# Patient Record
Sex: FEMALE | Race: WHITE | NOT HISPANIC OR LATINO
[De-identification: names, ages, dates, MRNs, and addresses within clinical notes are randomized per-mention and may not be internally consistent; named-entity substitution may affect disease eponyms.]

---

## 2018-04-11 ENCOUNTER — APPOINTMENT (OUTPATIENT)
Dept: GASTROENTEROLOGY | Facility: CLINIC | Age: 83
End: 2018-04-11
Payer: MEDICARE

## 2018-04-11 VITALS
HEIGHT: 62 IN | SYSTOLIC BLOOD PRESSURE: 180 MMHG | WEIGHT: 122 LBS | DIASTOLIC BLOOD PRESSURE: 90 MMHG | BODY MASS INDEX: 22.45 KG/M2 | TEMPERATURE: 98.2 F

## 2018-04-11 DIAGNOSIS — I48.91 UNSPECIFIED ATRIAL FIBRILLATION: ICD-10-CM

## 2018-04-11 DIAGNOSIS — R53.82 CHRONIC FATIGUE, UNSPECIFIED: ICD-10-CM

## 2018-04-11 DIAGNOSIS — K21.9 GASTRO-ESOPHAGEAL REFLUX DISEASE W/OUT ESOPHAGITIS: ICD-10-CM

## 2018-04-11 DIAGNOSIS — D64.9 ANEMIA, UNSPECIFIED: ICD-10-CM

## 2018-04-11 PROCEDURE — 99204 OFFICE O/P NEW MOD 45 MIN: CPT

## 2018-04-11 RX ORDER — LISINOPRIL 10 MG/1
10 TABLET ORAL
Qty: 90 | Refills: 0 | Status: ACTIVE | COMMUNITY
Start: 2018-02-02

## 2018-04-11 RX ORDER — METOPROLOL TARTRATE 25 MG/1
25 TABLET, FILM COATED ORAL
Qty: 180 | Refills: 0 | Status: ACTIVE | COMMUNITY
Start: 2017-11-10

## 2018-04-11 RX ORDER — FERROUS SULFATE 325(65) MG
325 (65 FE) TABLET ORAL DAILY
Qty: 30 | Refills: 0 | Status: ACTIVE | COMMUNITY
Start: 2018-04-11 | End: 1900-01-01

## 2018-04-11 RX ORDER — APIXABAN 2.5 MG/1
2.5 TABLET, FILM COATED ORAL
Qty: 180 | Refills: 0 | Status: ACTIVE | COMMUNITY
Start: 2018-03-30

## 2018-04-11 RX ORDER — SIMVASTATIN 10 MG/1
10 TABLET, FILM COATED ORAL
Qty: 90 | Refills: 0 | Status: ACTIVE | COMMUNITY
Start: 2017-04-07

## 2018-04-11 RX ORDER — LISINOPRIL 5 MG/1
5 TABLET ORAL
Qty: 90 | Refills: 0 | Status: ACTIVE | COMMUNITY
Start: 2018-02-02

## 2018-04-11 RX ORDER — DIGOXIN 125 UG/1
125 TABLET ORAL
Qty: 90 | Refills: 0 | Status: ACTIVE | COMMUNITY
Start: 2017-11-10

## 2021-01-01 ENCOUNTER — INPATIENT (INPATIENT)
Facility: HOSPITAL | Age: 86
LOS: 23 days | End: 2021-09-28
Attending: GENERAL PRACTICE | Admitting: GENERAL PRACTICE
Payer: MEDICARE

## 2021-01-01 ENCOUNTER — TRANSCRIPTION ENCOUNTER (OUTPATIENT)
Age: 86
End: 2021-01-01

## 2021-01-01 VITALS
OXYGEN SATURATION: 99 % | SYSTOLIC BLOOD PRESSURE: 153 MMHG | HEART RATE: 72 BPM | HEIGHT: 62 IN | TEMPERATURE: 98 F | DIASTOLIC BLOOD PRESSURE: 75 MMHG | RESPIRATION RATE: 16 BRPM

## 2021-01-01 VITALS
DIASTOLIC BLOOD PRESSURE: 58 MMHG | OXYGEN SATURATION: 94 % | SYSTOLIC BLOOD PRESSURE: 142 MMHG | HEART RATE: 64 BPM | RESPIRATION RATE: 20 BRPM | TEMPERATURE: 98 F

## 2021-01-01 DIAGNOSIS — I48.91 UNSPECIFIED ATRIAL FIBRILLATION: ICD-10-CM

## 2021-01-01 DIAGNOSIS — I24.8 OTHER FORMS OF ACUTE ISCHEMIC HEART DISEASE: ICD-10-CM

## 2021-01-01 DIAGNOSIS — F03.90 UNSPECIFIED DEMENTIA, UNSPECIFIED SEVERITY, WITHOUT BEHAVIORAL DISTURBANCE, PSYCHOTIC DISTURBANCE, MOOD DISTURBANCE, AND ANXIETY: ICD-10-CM

## 2021-01-01 DIAGNOSIS — R29.6 REPEATED FALLS: ICD-10-CM

## 2021-01-01 DIAGNOSIS — Z91.19 PATIENT'S NONCOMPLIANCE WITH OTHER MEDICAL TREATMENT AND REGIMEN: ICD-10-CM

## 2021-01-01 DIAGNOSIS — I10 ESSENTIAL (PRIMARY) HYPERTENSION: ICD-10-CM

## 2021-01-01 DIAGNOSIS — I50.20 UNSPECIFIED SYSTOLIC (CONGESTIVE) HEART FAILURE: ICD-10-CM

## 2021-01-01 DIAGNOSIS — Z65.9 PROBLEM RELATED TO UNSPECIFIED PSYCHOSOCIAL CIRCUMSTANCES: ICD-10-CM

## 2021-01-01 LAB
-  AMPICILLIN/SULBACTAM: SIGNIFICANT CHANGE UP
-  CEFAZOLIN: SIGNIFICANT CHANGE UP
-  CLINDAMYCIN: SIGNIFICANT CHANGE UP
-  ERYTHROMYCIN: SIGNIFICANT CHANGE UP
-  GENTAMICIN: SIGNIFICANT CHANGE UP
-  OXACILLIN: SIGNIFICANT CHANGE UP
-  PENICILLIN: SIGNIFICANT CHANGE UP
-  RIFAMPIN: SIGNIFICANT CHANGE UP
-  TETRACYCLINE: SIGNIFICANT CHANGE UP
-  TRIMETHOPRIM/SULFAMETHOXAZOLE: SIGNIFICANT CHANGE UP
-  VANCOMYCIN: SIGNIFICANT CHANGE UP
A1C WITH ESTIMATED AVERAGE GLUCOSE RESULT: 5.8 % — HIGH (ref 4–5.6)
ALBUMIN SERPL ELPH-MCNC: 2.8 G/DL — LOW (ref 3.3–5)
ALBUMIN SERPL ELPH-MCNC: 2.9 G/DL — LOW (ref 3.3–5)
ALBUMIN SERPL ELPH-MCNC: 3 G/DL — LOW (ref 3.3–5)
ALBUMIN SERPL ELPH-MCNC: 3 G/DL — LOW (ref 3.3–5)
ALBUMIN SERPL ELPH-MCNC: 3.1 G/DL — LOW (ref 3.3–5)
ALBUMIN SERPL ELPH-MCNC: 3.3 G/DL — SIGNIFICANT CHANGE UP (ref 3.3–5)
ALBUMIN SERPL ELPH-MCNC: 3.6 G/DL — SIGNIFICANT CHANGE UP (ref 3.3–5)
ALBUMIN SERPL ELPH-MCNC: 3.6 G/DL — SIGNIFICANT CHANGE UP (ref 3.3–5)
ALBUMIN SERPL ELPH-MCNC: 3.7 G/DL — SIGNIFICANT CHANGE UP (ref 3.3–5)
ALBUMIN SERPL ELPH-MCNC: 3.9 G/DL — SIGNIFICANT CHANGE UP (ref 3.3–5)
ALBUMIN SERPL ELPH-MCNC: 4 G/DL — SIGNIFICANT CHANGE UP (ref 3.3–5)
ALBUMIN SERPL ELPH-MCNC: 4.1 G/DL — SIGNIFICANT CHANGE UP (ref 3.3–5)
ALP SERPL-CCNC: 109 U/L — SIGNIFICANT CHANGE UP (ref 40–120)
ALP SERPL-CCNC: 110 U/L — SIGNIFICANT CHANGE UP (ref 40–120)
ALP SERPL-CCNC: 156 U/L — HIGH (ref 40–120)
ALP SERPL-CCNC: 188 U/L — HIGH (ref 40–120)
ALP SERPL-CCNC: 263 U/L — HIGH (ref 40–120)
ALP SERPL-CCNC: 73 U/L — SIGNIFICANT CHANGE UP (ref 40–120)
ALP SERPL-CCNC: 81 U/L — SIGNIFICANT CHANGE UP (ref 40–120)
ALP SERPL-CCNC: 82 U/L — SIGNIFICANT CHANGE UP (ref 40–120)
ALP SERPL-CCNC: 84 U/L — SIGNIFICANT CHANGE UP (ref 40–120)
ALP SERPL-CCNC: 87 U/L — SIGNIFICANT CHANGE UP (ref 40–120)
ALP SERPL-CCNC: 88 U/L — SIGNIFICANT CHANGE UP (ref 40–120)
ALP SERPL-CCNC: 96 U/L — SIGNIFICANT CHANGE UP (ref 40–120)
ALT FLD-CCNC: 13 U/L — SIGNIFICANT CHANGE UP (ref 4–33)
ALT FLD-CCNC: 13 U/L — SIGNIFICANT CHANGE UP (ref 4–33)
ALT FLD-CCNC: 14 U/L — SIGNIFICANT CHANGE UP (ref 4–33)
ALT FLD-CCNC: 15 U/L — SIGNIFICANT CHANGE UP (ref 4–33)
ALT FLD-CCNC: 18 U/L — SIGNIFICANT CHANGE UP (ref 4–33)
ALT FLD-CCNC: 19 U/L — SIGNIFICANT CHANGE UP (ref 4–33)
ALT FLD-CCNC: 20 U/L — SIGNIFICANT CHANGE UP (ref 4–33)
ALT FLD-CCNC: 21 U/L — SIGNIFICANT CHANGE UP (ref 4–33)
ALT FLD-CCNC: 23 U/L — SIGNIFICANT CHANGE UP (ref 4–33)
ALT FLD-CCNC: 23 U/L — SIGNIFICANT CHANGE UP (ref 4–33)
ANION GAP SERPL CALC-SCNC: 10 MMOL/L — SIGNIFICANT CHANGE UP (ref 7–14)
ANION GAP SERPL CALC-SCNC: 10 MMOL/L — SIGNIFICANT CHANGE UP (ref 7–14)
ANION GAP SERPL CALC-SCNC: 11 MMOL/L — SIGNIFICANT CHANGE UP (ref 7–14)
ANION GAP SERPL CALC-SCNC: 12 MMOL/L — SIGNIFICANT CHANGE UP (ref 7–14)
ANION GAP SERPL CALC-SCNC: 12 MMOL/L — SIGNIFICANT CHANGE UP (ref 7–14)
ANION GAP SERPL CALC-SCNC: 13 MMOL/L — SIGNIFICANT CHANGE UP (ref 7–14)
ANION GAP SERPL CALC-SCNC: 13 MMOL/L — SIGNIFICANT CHANGE UP (ref 7–14)
ANION GAP SERPL CALC-SCNC: 14 MMOL/L — SIGNIFICANT CHANGE UP (ref 7–14)
ANION GAP SERPL CALC-SCNC: 15 MMOL/L — HIGH (ref 7–14)
ANION GAP SERPL CALC-SCNC: 16 MMOL/L — HIGH (ref 7–14)
ANION GAP SERPL CALC-SCNC: 16 MMOL/L — HIGH (ref 7–14)
ANION GAP SERPL CALC-SCNC: 18 MMOL/L — HIGH (ref 7–14)
ANION GAP SERPL CALC-SCNC: 19 MMOL/L — HIGH (ref 7–14)
ANION GAP SERPL CALC-SCNC: 19 MMOL/L — HIGH (ref 7–14)
ANION GAP SERPL CALC-SCNC: 21 MMOL/L — HIGH (ref 7–14)
APPEARANCE UR: CLEAR — SIGNIFICANT CHANGE UP
APTT BLD: 29.8 SEC — SIGNIFICANT CHANGE UP (ref 27–36.3)
AST SERPL-CCNC: 22 U/L — SIGNIFICANT CHANGE UP (ref 4–32)
AST SERPL-CCNC: 24 U/L — SIGNIFICANT CHANGE UP (ref 4–32)
AST SERPL-CCNC: 27 U/L — SIGNIFICANT CHANGE UP (ref 4–32)
AST SERPL-CCNC: 29 U/L — SIGNIFICANT CHANGE UP (ref 4–32)
AST SERPL-CCNC: 30 U/L — SIGNIFICANT CHANGE UP (ref 4–32)
AST SERPL-CCNC: 32 U/L — SIGNIFICANT CHANGE UP (ref 4–32)
AST SERPL-CCNC: 34 U/L — HIGH (ref 4–32)
AST SERPL-CCNC: 36 U/L — HIGH (ref 4–32)
AST SERPL-CCNC: 37 U/L — HIGH (ref 4–32)
AST SERPL-CCNC: 49 U/L — HIGH (ref 4–32)
AST SERPL-CCNC: 61 U/L — HIGH (ref 4–32)
AST SERPL-CCNC: 66 U/L — HIGH (ref 4–32)
B PERT DNA SPEC QL NAA+PROBE: SIGNIFICANT CHANGE UP
B PERT+PARAPERT DNA PNL SPEC NAA+PROBE: SIGNIFICANT CHANGE UP
BASOPHILS # BLD AUTO: 0.02 K/UL — SIGNIFICANT CHANGE UP (ref 0–0.2)
BASOPHILS # BLD AUTO: 0.03 K/UL — SIGNIFICANT CHANGE UP (ref 0–0.2)
BASOPHILS # BLD AUTO: 0.04 K/UL — SIGNIFICANT CHANGE UP (ref 0–0.2)
BASOPHILS # BLD AUTO: 0.05 K/UL — SIGNIFICANT CHANGE UP (ref 0–0.2)
BASOPHILS # BLD AUTO: 0.06 K/UL — SIGNIFICANT CHANGE UP (ref 0–0.2)
BASOPHILS NFR BLD AUTO: 0.1 % — SIGNIFICANT CHANGE UP (ref 0–2)
BASOPHILS NFR BLD AUTO: 0.2 % — SIGNIFICANT CHANGE UP (ref 0–2)
BASOPHILS NFR BLD AUTO: 0.3 % — SIGNIFICANT CHANGE UP (ref 0–2)
BASOPHILS NFR BLD AUTO: 0.4 % — SIGNIFICANT CHANGE UP (ref 0–2)
BASOPHILS NFR BLD AUTO: 0.5 % — SIGNIFICANT CHANGE UP (ref 0–2)
BILIRUB SERPL-MCNC: 1 MG/DL — SIGNIFICANT CHANGE UP (ref 0.2–1.2)
BILIRUB SERPL-MCNC: 1.5 MG/DL — HIGH (ref 0.2–1.2)
BILIRUB SERPL-MCNC: 1.6 MG/DL — HIGH (ref 0.2–1.2)
BILIRUB SERPL-MCNC: 1.7 MG/DL — HIGH (ref 0.2–1.2)
BILIRUB SERPL-MCNC: 2 MG/DL — HIGH (ref 0.2–1.2)
BILIRUB SERPL-MCNC: 2.2 MG/DL — HIGH (ref 0.2–1.2)
BILIRUB UR-MCNC: ABNORMAL
BORDETELLA PARAPERTUSSIS (RAPRVP): SIGNIFICANT CHANGE UP
BUN SERPL-MCNC: 19 MG/DL — SIGNIFICANT CHANGE UP (ref 7–23)
BUN SERPL-MCNC: 21 MG/DL — SIGNIFICANT CHANGE UP (ref 7–23)
BUN SERPL-MCNC: 22 MG/DL — SIGNIFICANT CHANGE UP (ref 7–23)
BUN SERPL-MCNC: 23 MG/DL — SIGNIFICANT CHANGE UP (ref 7–23)
BUN SERPL-MCNC: 24 MG/DL — HIGH (ref 7–23)
BUN SERPL-MCNC: 24 MG/DL — HIGH (ref 7–23)
BUN SERPL-MCNC: 26 MG/DL — HIGH (ref 7–23)
BUN SERPL-MCNC: 27 MG/DL — HIGH (ref 7–23)
BUN SERPL-MCNC: 28 MG/DL — HIGH (ref 7–23)
BUN SERPL-MCNC: 28 MG/DL — HIGH (ref 7–23)
BUN SERPL-MCNC: 30 MG/DL — HIGH (ref 7–23)
BUN SERPL-MCNC: 30 MG/DL — HIGH (ref 7–23)
BUN SERPL-MCNC: 31 MG/DL — HIGH (ref 7–23)
BUN SERPL-MCNC: 32 MG/DL — HIGH (ref 7–23)
BUN SERPL-MCNC: 34 MG/DL — HIGH (ref 7–23)
BUN SERPL-MCNC: 43 MG/DL — HIGH (ref 7–23)
BUN SERPL-MCNC: 43 MG/DL — HIGH (ref 7–23)
C PNEUM DNA SPEC QL NAA+PROBE: SIGNIFICANT CHANGE UP
CALCIUM SERPL-MCNC: 8.6 MG/DL — SIGNIFICANT CHANGE UP (ref 8.4–10.5)
CALCIUM SERPL-MCNC: 8.7 MG/DL — SIGNIFICANT CHANGE UP (ref 8.4–10.5)
CALCIUM SERPL-MCNC: 8.8 MG/DL — SIGNIFICANT CHANGE UP (ref 8.4–10.5)
CALCIUM SERPL-MCNC: 8.9 MG/DL — SIGNIFICANT CHANGE UP (ref 8.4–10.5)
CALCIUM SERPL-MCNC: 9 MG/DL — SIGNIFICANT CHANGE UP (ref 8.4–10.5)
CALCIUM SERPL-MCNC: 9.1 MG/DL — SIGNIFICANT CHANGE UP (ref 8.4–10.5)
CALCIUM SERPL-MCNC: 9.2 MG/DL — SIGNIFICANT CHANGE UP (ref 8.4–10.5)
CALCIUM SERPL-MCNC: 9.3 MG/DL — SIGNIFICANT CHANGE UP (ref 8.4–10.5)
CALCIUM SERPL-MCNC: 9.4 MG/DL — SIGNIFICANT CHANGE UP (ref 8.4–10.5)
CALCIUM SERPL-MCNC: 9.4 MG/DL — SIGNIFICANT CHANGE UP (ref 8.4–10.5)
CHLORIDE SERPL-SCNC: 102 MMOL/L — SIGNIFICANT CHANGE UP (ref 98–107)
CHLORIDE SERPL-SCNC: 105 MMOL/L — SIGNIFICANT CHANGE UP (ref 98–107)
CHLORIDE SERPL-SCNC: 107 MMOL/L — SIGNIFICANT CHANGE UP (ref 98–107)
CHLORIDE SERPL-SCNC: 107 MMOL/L — SIGNIFICANT CHANGE UP (ref 98–107)
CHLORIDE SERPL-SCNC: 109 MMOL/L — HIGH (ref 98–107)
CHLORIDE SERPL-SCNC: 111 MMOL/L — HIGH (ref 98–107)
CHLORIDE SERPL-SCNC: 113 MMOL/L — HIGH (ref 98–107)
CHLORIDE SERPL-SCNC: 113 MMOL/L — HIGH (ref 98–107)
CHLORIDE SERPL-SCNC: 114 MMOL/L — HIGH (ref 98–107)
CHLORIDE SERPL-SCNC: 115 MMOL/L — HIGH (ref 98–107)
CHLORIDE SERPL-SCNC: 117 MMOL/L — HIGH (ref 98–107)
CHLORIDE SERPL-SCNC: 92 MMOL/L — LOW (ref 98–107)
CHLORIDE SERPL-SCNC: 95 MMOL/L — LOW (ref 98–107)
CHLORIDE SERPL-SCNC: 97 MMOL/L — LOW (ref 98–107)
CHLORIDE SERPL-SCNC: 97 MMOL/L — LOW (ref 98–107)
CHLORIDE SERPL-SCNC: 99 MMOL/L — SIGNIFICANT CHANGE UP (ref 98–107)
CHLORIDE SERPL-SCNC: 99 MMOL/L — SIGNIFICANT CHANGE UP (ref 98–107)
CHOLEST SERPL-MCNC: 152 MG/DL — SIGNIFICANT CHANGE UP
CK MB BLD-MCNC: 1.9 % — SIGNIFICANT CHANGE UP (ref 0–2.5)
CK MB BLD-MCNC: 2.5 % — SIGNIFICANT CHANGE UP (ref 0–2.5)
CK MB CFR SERPL CALC: 7.1 NG/ML — HIGH
CK MB CFR SERPL CALC: 7.7 NG/ML — HIGH
CK SERPL-CCNC: 288 U/L — HIGH (ref 25–170)
CK SERPL-CCNC: 406 U/L — HIGH (ref 25–170)
CO2 SERPL-SCNC: 18 MMOL/L — LOW (ref 22–31)
CO2 SERPL-SCNC: 19 MMOL/L — LOW (ref 22–31)
CO2 SERPL-SCNC: 20 MMOL/L — LOW (ref 22–31)
CO2 SERPL-SCNC: 21 MMOL/L — LOW (ref 22–31)
CO2 SERPL-SCNC: 22 MMOL/L — SIGNIFICANT CHANGE UP (ref 22–31)
CO2 SERPL-SCNC: 23 MMOL/L — SIGNIFICANT CHANGE UP (ref 22–31)
CO2 SERPL-SCNC: 24 MMOL/L — SIGNIFICANT CHANGE UP (ref 22–31)
CO2 SERPL-SCNC: 25 MMOL/L — SIGNIFICANT CHANGE UP (ref 22–31)
CO2 SERPL-SCNC: 26 MMOL/L — SIGNIFICANT CHANGE UP (ref 22–31)
CO2 SERPL-SCNC: 27 MMOL/L — SIGNIFICANT CHANGE UP (ref 22–31)
CO2 SERPL-SCNC: 28 MMOL/L — SIGNIFICANT CHANGE UP (ref 22–31)
COLOR SPEC: YELLOW — SIGNIFICANT CHANGE UP
COVID-19 SPIKE DOMAIN AB INTERP: NEGATIVE — SIGNIFICANT CHANGE UP
COVID-19 SPIKE DOMAIN ANTIBODY RESULT: 0.4 U/ML — SIGNIFICANT CHANGE UP
CREAT SERPL-MCNC: 0.6 MG/DL — SIGNIFICANT CHANGE UP (ref 0.5–1.3)
CREAT SERPL-MCNC: 0.61 MG/DL — SIGNIFICANT CHANGE UP (ref 0.5–1.3)
CREAT SERPL-MCNC: 0.63 MG/DL — SIGNIFICANT CHANGE UP (ref 0.5–1.3)
CREAT SERPL-MCNC: 0.65 MG/DL — SIGNIFICANT CHANGE UP (ref 0.5–1.3)
CREAT SERPL-MCNC: 0.65 MG/DL — SIGNIFICANT CHANGE UP (ref 0.5–1.3)
CREAT SERPL-MCNC: 0.66 MG/DL — SIGNIFICANT CHANGE UP (ref 0.5–1.3)
CREAT SERPL-MCNC: 0.66 MG/DL — SIGNIFICANT CHANGE UP (ref 0.5–1.3)
CREAT SERPL-MCNC: 0.68 MG/DL — SIGNIFICANT CHANGE UP (ref 0.5–1.3)
CREAT SERPL-MCNC: 0.68 MG/DL — SIGNIFICANT CHANGE UP (ref 0.5–1.3)
CREAT SERPL-MCNC: 0.69 MG/DL — SIGNIFICANT CHANGE UP (ref 0.5–1.3)
CREAT SERPL-MCNC: 0.69 MG/DL — SIGNIFICANT CHANGE UP (ref 0.5–1.3)
CREAT SERPL-MCNC: 0.7 MG/DL — SIGNIFICANT CHANGE UP (ref 0.5–1.3)
CREAT SERPL-MCNC: 0.75 MG/DL — SIGNIFICANT CHANGE UP (ref 0.5–1.3)
CREAT SERPL-MCNC: 0.81 MG/DL — SIGNIFICANT CHANGE UP (ref 0.5–1.3)
CREAT SERPL-MCNC: 0.83 MG/DL — SIGNIFICANT CHANGE UP (ref 0.5–1.3)
CREAT SERPL-MCNC: 0.84 MG/DL — SIGNIFICANT CHANGE UP (ref 0.5–1.3)
CREAT SERPL-MCNC: 0.84 MG/DL — SIGNIFICANT CHANGE UP (ref 0.5–1.3)
CRP SERPL-MCNC: 47.3 MG/L — HIGH
CULTURE RESULTS: SIGNIFICANT CHANGE UP
DIFF PNL FLD: ABNORMAL
DIGOXIN SERPL-MCNC: <0.3 NG/ML — LOW (ref 0.8–2)
EOSINOPHIL # BLD AUTO: 0 K/UL — SIGNIFICANT CHANGE UP (ref 0–0.5)
EOSINOPHIL # BLD AUTO: 0.01 K/UL — SIGNIFICANT CHANGE UP (ref 0–0.5)
EOSINOPHIL # BLD AUTO: 0.03 K/UL — SIGNIFICANT CHANGE UP (ref 0–0.5)
EOSINOPHIL # BLD AUTO: 0.04 K/UL — SIGNIFICANT CHANGE UP (ref 0–0.5)
EOSINOPHIL # BLD AUTO: 0.07 K/UL — SIGNIFICANT CHANGE UP (ref 0–0.5)
EOSINOPHIL # BLD AUTO: 0.07 K/UL — SIGNIFICANT CHANGE UP (ref 0–0.5)
EOSINOPHIL # BLD AUTO: 0.09 K/UL — SIGNIFICANT CHANGE UP (ref 0–0.5)
EOSINOPHIL # BLD AUTO: 0.09 K/UL — SIGNIFICANT CHANGE UP (ref 0–0.5)
EOSINOPHIL # BLD AUTO: 0.13 K/UL — SIGNIFICANT CHANGE UP (ref 0–0.5)
EOSINOPHIL # BLD AUTO: 0.17 K/UL — SIGNIFICANT CHANGE UP (ref 0–0.5)
EOSINOPHIL # BLD AUTO: 0.25 K/UL — SIGNIFICANT CHANGE UP (ref 0–0.5)
EOSINOPHIL NFR BLD AUTO: 0 % — SIGNIFICANT CHANGE UP (ref 0–6)
EOSINOPHIL NFR BLD AUTO: 0 % — SIGNIFICANT CHANGE UP (ref 0–6)
EOSINOPHIL NFR BLD AUTO: 0.1 % — SIGNIFICANT CHANGE UP (ref 0–6)
EOSINOPHIL NFR BLD AUTO: 0.1 % — SIGNIFICANT CHANGE UP (ref 0–6)
EOSINOPHIL NFR BLD AUTO: 0.2 % — SIGNIFICANT CHANGE UP (ref 0–6)
EOSINOPHIL NFR BLD AUTO: 0.4 % — SIGNIFICANT CHANGE UP (ref 0–6)
EOSINOPHIL NFR BLD AUTO: 0.5 % — SIGNIFICANT CHANGE UP (ref 0–6)
EOSINOPHIL NFR BLD AUTO: 0.5 % — SIGNIFICANT CHANGE UP (ref 0–6)
EOSINOPHIL NFR BLD AUTO: 0.6 % — SIGNIFICANT CHANGE UP (ref 0–6)
EOSINOPHIL NFR BLD AUTO: 0.6 % — SIGNIFICANT CHANGE UP (ref 0–6)
EOSINOPHIL NFR BLD AUTO: 1.1 % — SIGNIFICANT CHANGE UP (ref 0–6)
EOSINOPHIL NFR BLD AUTO: 1.3 % — SIGNIFICANT CHANGE UP (ref 0–6)
EOSINOPHIL NFR BLD AUTO: 1.7 % — SIGNIFICANT CHANGE UP (ref 0–6)
ERYTHROCYTE [SEDIMENTATION RATE] IN BLOOD: 40 MM/HR — HIGH (ref 4–25)
ESTIMATED AVERAGE GLUCOSE: 120 — SIGNIFICANT CHANGE UP
FLUAV SUBTYP SPEC NAA+PROBE: SIGNIFICANT CHANGE UP
FLUBV RNA SPEC QL NAA+PROBE: SIGNIFICANT CHANGE UP
GLUCOSE BLDC GLUCOMTR-MCNC: 171 MG/DL — HIGH (ref 70–99)
GLUCOSE SERPL-MCNC: 128 MG/DL — HIGH (ref 70–99)
GLUCOSE SERPL-MCNC: 133 MG/DL — HIGH (ref 70–99)
GLUCOSE SERPL-MCNC: 135 MG/DL — HIGH (ref 70–99)
GLUCOSE SERPL-MCNC: 137 MG/DL — HIGH (ref 70–99)
GLUCOSE SERPL-MCNC: 139 MG/DL — HIGH (ref 70–99)
GLUCOSE SERPL-MCNC: 140 MG/DL — HIGH (ref 70–99)
GLUCOSE SERPL-MCNC: 140 MG/DL — HIGH (ref 70–99)
GLUCOSE SERPL-MCNC: 144 MG/DL — HIGH (ref 70–99)
GLUCOSE SERPL-MCNC: 146 MG/DL — HIGH (ref 70–99)
GLUCOSE SERPL-MCNC: 147 MG/DL — HIGH (ref 70–99)
GLUCOSE SERPL-MCNC: 150 MG/DL — HIGH (ref 70–99)
GLUCOSE SERPL-MCNC: 153 MG/DL — HIGH (ref 70–99)
GLUCOSE SERPL-MCNC: 158 MG/DL — HIGH (ref 70–99)
GLUCOSE SERPL-MCNC: 161 MG/DL — HIGH (ref 70–99)
GLUCOSE SERPL-MCNC: 165 MG/DL — HIGH (ref 70–99)
GLUCOSE SERPL-MCNC: 175 MG/DL — HIGH (ref 70–99)
GLUCOSE SERPL-MCNC: 184 MG/DL — HIGH (ref 70–99)
GLUCOSE UR QL: NEGATIVE — SIGNIFICANT CHANGE UP
GRAM STN FLD: SIGNIFICANT CHANGE UP
HADV DNA SPEC QL NAA+PROBE: SIGNIFICANT CHANGE UP
HCOV 229E RNA SPEC QL NAA+PROBE: SIGNIFICANT CHANGE UP
HCOV HKU1 RNA SPEC QL NAA+PROBE: SIGNIFICANT CHANGE UP
HCOV NL63 RNA SPEC QL NAA+PROBE: SIGNIFICANT CHANGE UP
HCOV OC43 RNA SPEC QL NAA+PROBE: SIGNIFICANT CHANGE UP
HCT VFR BLD CALC: 35 % — SIGNIFICANT CHANGE UP (ref 34.5–45)
HCT VFR BLD CALC: 36 % — SIGNIFICANT CHANGE UP (ref 34.5–45)
HCT VFR BLD CALC: 36.5 % — SIGNIFICANT CHANGE UP (ref 34.5–45)
HCT VFR BLD CALC: 36.7 % — SIGNIFICANT CHANGE UP (ref 34.5–45)
HCT VFR BLD CALC: 38.3 % — SIGNIFICANT CHANGE UP (ref 34.5–45)
HCT VFR BLD CALC: 41.8 % — SIGNIFICANT CHANGE UP (ref 34.5–45)
HCT VFR BLD CALC: 42.7 % — SIGNIFICANT CHANGE UP (ref 34.5–45)
HCT VFR BLD CALC: 43.2 % — SIGNIFICANT CHANGE UP (ref 34.5–45)
HCT VFR BLD CALC: 43.9 % — SIGNIFICANT CHANGE UP (ref 34.5–45)
HCT VFR BLD CALC: 45.2 % — HIGH (ref 34.5–45)
HCT VFR BLD CALC: 46.2 % — HIGH (ref 34.5–45)
HCT VFR BLD CALC: 47 % — HIGH (ref 34.5–45)
HCT VFR BLD CALC: 47.1 % — HIGH (ref 34.5–45)
HCT VFR BLD CALC: 48.2 % — HIGH (ref 34.5–45)
HDLC SERPL-MCNC: 66 MG/DL — SIGNIFICANT CHANGE UP
HGB BLD-MCNC: 11.5 G/DL — SIGNIFICANT CHANGE UP (ref 11.5–15.5)
HGB BLD-MCNC: 11.7 G/DL — SIGNIFICANT CHANGE UP (ref 11.5–15.5)
HGB BLD-MCNC: 11.8 G/DL — SIGNIFICANT CHANGE UP (ref 11.5–15.5)
HGB BLD-MCNC: 12.3 G/DL — SIGNIFICANT CHANGE UP (ref 11.5–15.5)
HGB BLD-MCNC: 12.4 G/DL — SIGNIFICANT CHANGE UP (ref 11.5–15.5)
HGB BLD-MCNC: 13 G/DL — SIGNIFICANT CHANGE UP (ref 11.5–15.5)
HGB BLD-MCNC: 13.1 G/DL — SIGNIFICANT CHANGE UP (ref 11.5–15.5)
HGB BLD-MCNC: 13.6 G/DL — SIGNIFICANT CHANGE UP (ref 11.5–15.5)
HGB BLD-MCNC: 13.6 G/DL — SIGNIFICANT CHANGE UP (ref 11.5–15.5)
HGB BLD-MCNC: 13.8 G/DL — SIGNIFICANT CHANGE UP (ref 11.5–15.5)
HGB BLD-MCNC: 14.1 G/DL — SIGNIFICANT CHANGE UP (ref 11.5–15.5)
HGB BLD-MCNC: 14.4 G/DL — SIGNIFICANT CHANGE UP (ref 11.5–15.5)
HGB BLD-MCNC: 14.8 G/DL — SIGNIFICANT CHANGE UP (ref 11.5–15.5)
HGB BLD-MCNC: 15.1 G/DL — SIGNIFICANT CHANGE UP (ref 11.5–15.5)
HMPV RNA SPEC QL NAA+PROBE: SIGNIFICANT CHANGE UP
HPIV1 RNA SPEC QL NAA+PROBE: SIGNIFICANT CHANGE UP
HPIV2 RNA SPEC QL NAA+PROBE: SIGNIFICANT CHANGE UP
HPIV3 RNA SPEC QL NAA+PROBE: SIGNIFICANT CHANGE UP
HPIV4 RNA SPEC QL NAA+PROBE: SIGNIFICANT CHANGE UP
IANC: 10.01 K/UL — HIGH (ref 1.5–8.5)
IANC: 10.22 K/UL — HIGH (ref 1.5–8.5)
IANC: 10.35 K/UL — HIGH (ref 1.5–8.5)
IANC: 10.49 K/UL — HIGH (ref 1.5–8.5)
IANC: 11.96 K/UL — HIGH (ref 1.5–8.5)
IANC: 12.31 K/UL — HIGH (ref 1.5–8.5)
IANC: 12.85 K/UL — HIGH (ref 1.5–8.5)
IANC: 15.45 K/UL — HIGH (ref 1.5–8.5)
IANC: 15.49 K/UL — HIGH (ref 1.5–8.5)
IANC: 22.56 K/UL — HIGH (ref 1.5–8.5)
IANC: 28.12 K/UL — HIGH (ref 1.5–8.5)
IANC: 7.43 K/UL — SIGNIFICANT CHANGE UP (ref 1.5–8.5)
IANC: 8.57 K/UL — HIGH (ref 1.5–8.5)
IMM GRANULOCYTES NFR BLD AUTO: 0.7 % — SIGNIFICANT CHANGE UP (ref 0–1.5)
IMM GRANULOCYTES NFR BLD AUTO: 0.8 % — SIGNIFICANT CHANGE UP (ref 0–1.5)
IMM GRANULOCYTES NFR BLD AUTO: 0.8 % — SIGNIFICANT CHANGE UP (ref 0–1.5)
IMM GRANULOCYTES NFR BLD AUTO: 0.9 % — SIGNIFICANT CHANGE UP (ref 0–1.5)
IMM GRANULOCYTES NFR BLD AUTO: 0.9 % — SIGNIFICANT CHANGE UP (ref 0–1.5)
IMM GRANULOCYTES NFR BLD AUTO: 1 % — SIGNIFICANT CHANGE UP (ref 0–1.5)
IMM GRANULOCYTES NFR BLD AUTO: 1.1 % — SIGNIFICANT CHANGE UP (ref 0–1.5)
IMM GRANULOCYTES NFR BLD AUTO: 1.1 % — SIGNIFICANT CHANGE UP (ref 0–1.5)
IMM GRANULOCYTES NFR BLD AUTO: 1.2 % — SIGNIFICANT CHANGE UP (ref 0–1.5)
IMM GRANULOCYTES NFR BLD AUTO: 1.4 % — SIGNIFICANT CHANGE UP (ref 0–1.5)
IMM GRANULOCYTES NFR BLD AUTO: 1.6 % — HIGH (ref 0–1.5)
IMM GRANULOCYTES NFR BLD AUTO: 1.7 % — HIGH (ref 0–1.5)
IMM GRANULOCYTES NFR BLD AUTO: 1.7 % — HIGH (ref 0–1.5)
INR BLD: 1.5 RATIO — HIGH (ref 0.88–1.16)
KETONES UR-MCNC: ABNORMAL
LEUKOCYTE ESTERASE UR-ACNC: NEGATIVE — SIGNIFICANT CHANGE UP
LIPID PNL WITH DIRECT LDL SERPL: 72 MG/DL — SIGNIFICANT CHANGE UP
LYMPHOCYTES # BLD AUTO: 0.77 K/UL — LOW (ref 1–3.3)
LYMPHOCYTES # BLD AUTO: 0.79 K/UL — LOW (ref 1–3.3)
LYMPHOCYTES # BLD AUTO: 0.81 K/UL — LOW (ref 1–3.3)
LYMPHOCYTES # BLD AUTO: 0.84 K/UL — LOW (ref 1–3.3)
LYMPHOCYTES # BLD AUTO: 0.93 K/UL — LOW (ref 1–3.3)
LYMPHOCYTES # BLD AUTO: 0.98 K/UL — LOW (ref 1–3.3)
LYMPHOCYTES # BLD AUTO: 1.08 K/UL — SIGNIFICANT CHANGE UP (ref 1–3.3)
LYMPHOCYTES # BLD AUTO: 1.08 K/UL — SIGNIFICANT CHANGE UP (ref 1–3.3)
LYMPHOCYTES # BLD AUTO: 1.14 K/UL — SIGNIFICANT CHANGE UP (ref 1–3.3)
LYMPHOCYTES # BLD AUTO: 1.28 K/UL — SIGNIFICANT CHANGE UP (ref 1–3.3)
LYMPHOCYTES # BLD AUTO: 1.3 K/UL — SIGNIFICANT CHANGE UP (ref 1–3.3)
LYMPHOCYTES # BLD AUTO: 1.32 K/UL — SIGNIFICANT CHANGE UP (ref 1–3.3)
LYMPHOCYTES # BLD AUTO: 1.73 K/UL — SIGNIFICANT CHANGE UP (ref 1–3.3)
LYMPHOCYTES # BLD AUTO: 13.4 % — SIGNIFICANT CHANGE UP (ref 13–44)
LYMPHOCYTES # BLD AUTO: 2.7 % — LOW (ref 13–44)
LYMPHOCYTES # BLD AUTO: 4.3 % — LOW (ref 13–44)
LYMPHOCYTES # BLD AUTO: 5.6 % — LOW (ref 13–44)
LYMPHOCYTES # BLD AUTO: 6.4 % — LOW (ref 13–44)
LYMPHOCYTES # BLD AUTO: 6.5 % — LOW (ref 13–44)
LYMPHOCYTES # BLD AUTO: 6.6 % — LOW (ref 13–44)
LYMPHOCYTES # BLD AUTO: 7.5 % — LOW (ref 13–44)
LYMPHOCYTES # BLD AUTO: 8.3 % — LOW (ref 13–44)
LYMPHOCYTES # BLD AUTO: 8.8 % — LOW (ref 13–44)
LYMPHOCYTES # BLD AUTO: 8.8 % — LOW (ref 13–44)
LYMPHOCYTES # BLD AUTO: 9 % — LOW (ref 13–44)
LYMPHOCYTES # BLD AUTO: 9.2 % — LOW (ref 13–44)
M PNEUMO DNA SPEC QL NAA+PROBE: SIGNIFICANT CHANGE UP
MAGNESIUM SERPL-MCNC: 1.7 MG/DL — SIGNIFICANT CHANGE UP (ref 1.6–2.6)
MAGNESIUM SERPL-MCNC: 1.8 MG/DL — SIGNIFICANT CHANGE UP (ref 1.6–2.6)
MAGNESIUM SERPL-MCNC: 2 MG/DL — SIGNIFICANT CHANGE UP (ref 1.6–2.6)
MAGNESIUM SERPL-MCNC: 2.1 MG/DL — SIGNIFICANT CHANGE UP (ref 1.6–2.6)
MAGNESIUM SERPL-MCNC: 2.2 MG/DL — SIGNIFICANT CHANGE UP (ref 1.6–2.6)
MAGNESIUM SERPL-MCNC: 2.3 MG/DL — SIGNIFICANT CHANGE UP (ref 1.6–2.6)
MCHC RBC-ENTMCNC: 28.5 PG — SIGNIFICANT CHANGE UP (ref 27–34)
MCHC RBC-ENTMCNC: 28.5 PG — SIGNIFICANT CHANGE UP (ref 27–34)
MCHC RBC-ENTMCNC: 28.6 PG — SIGNIFICANT CHANGE UP (ref 27–34)
MCHC RBC-ENTMCNC: 28.6 PG — SIGNIFICANT CHANGE UP (ref 27–34)
MCHC RBC-ENTMCNC: 28.7 PG — SIGNIFICANT CHANGE UP (ref 27–34)
MCHC RBC-ENTMCNC: 28.7 PG — SIGNIFICANT CHANGE UP (ref 27–34)
MCHC RBC-ENTMCNC: 28.8 PG — SIGNIFICANT CHANGE UP (ref 27–34)
MCHC RBC-ENTMCNC: 28.9 PG — SIGNIFICANT CHANGE UP (ref 27–34)
MCHC RBC-ENTMCNC: 28.9 PG — SIGNIFICANT CHANGE UP (ref 27–34)
MCHC RBC-ENTMCNC: 29.1 PG — SIGNIFICANT CHANGE UP (ref 27–34)
MCHC RBC-ENTMCNC: 29.2 PG — SIGNIFICANT CHANGE UP (ref 27–34)
MCHC RBC-ENTMCNC: 29.5 PG — SIGNIFICANT CHANGE UP (ref 27–34)
MCHC RBC-ENTMCNC: 29.9 GM/DL — LOW (ref 32–36)
MCHC RBC-ENTMCNC: 30.1 GM/DL — LOW (ref 32–36)
MCHC RBC-ENTMCNC: 30.6 GM/DL — LOW (ref 32–36)
MCHC RBC-ENTMCNC: 30.7 GM/DL — LOW (ref 32–36)
MCHC RBC-ENTMCNC: 30.7 GM/DL — LOW (ref 32–36)
MCHC RBC-ENTMCNC: 31 GM/DL — LOW (ref 32–36)
MCHC RBC-ENTMCNC: 31.2 GM/DL — LOW (ref 32–36)
MCHC RBC-ENTMCNC: 32.1 GM/DL — SIGNIFICANT CHANGE UP (ref 32–36)
MCHC RBC-ENTMCNC: 32.3 GM/DL — SIGNIFICANT CHANGE UP (ref 32–36)
MCHC RBC-ENTMCNC: 32.4 GM/DL — SIGNIFICANT CHANGE UP (ref 32–36)
MCHC RBC-ENTMCNC: 32.5 GM/DL — SIGNIFICANT CHANGE UP (ref 32–36)
MCHC RBC-ENTMCNC: 32.5 GM/DL — SIGNIFICANT CHANGE UP (ref 32–36)
MCHC RBC-ENTMCNC: 32.9 GM/DL — SIGNIFICANT CHANGE UP (ref 32–36)
MCHC RBC-ENTMCNC: 33.5 GM/DL — SIGNIFICANT CHANGE UP (ref 32–36)
MCV RBC AUTO: 88 FL — SIGNIFICANT CHANGE UP (ref 80–100)
MCV RBC AUTO: 88.2 FL — SIGNIFICANT CHANGE UP (ref 80–100)
MCV RBC AUTO: 88.4 FL — SIGNIFICANT CHANGE UP (ref 80–100)
MCV RBC AUTO: 88.8 FL — SIGNIFICANT CHANGE UP (ref 80–100)
MCV RBC AUTO: 89.9 FL — SIGNIFICANT CHANGE UP (ref 80–100)
MCV RBC AUTO: 89.9 FL — SIGNIFICANT CHANGE UP (ref 80–100)
MCV RBC AUTO: 91.1 FL — SIGNIFICANT CHANGE UP (ref 80–100)
MCV RBC AUTO: 92.6 FL — SIGNIFICANT CHANGE UP (ref 80–100)
MCV RBC AUTO: 93.2 FL — SIGNIFICANT CHANGE UP (ref 80–100)
MCV RBC AUTO: 93.4 FL — SIGNIFICANT CHANGE UP (ref 80–100)
MCV RBC AUTO: 93.6 FL — SIGNIFICANT CHANGE UP (ref 80–100)
MCV RBC AUTO: 94 FL — SIGNIFICANT CHANGE UP (ref 80–100)
MCV RBC AUTO: 94.7 FL — SIGNIFICANT CHANGE UP (ref 80–100)
MCV RBC AUTO: 95.3 FL — SIGNIFICANT CHANGE UP (ref 80–100)
METHOD TYPE: SIGNIFICANT CHANGE UP
METHOD TYPE: SIGNIFICANT CHANGE UP
MONOCYTES # BLD AUTO: 0.66 K/UL — SIGNIFICANT CHANGE UP (ref 0–0.9)
MONOCYTES # BLD AUTO: 0.71 K/UL — SIGNIFICANT CHANGE UP (ref 0–0.9)
MONOCYTES # BLD AUTO: 0.84 K/UL — SIGNIFICANT CHANGE UP (ref 0–0.9)
MONOCYTES # BLD AUTO: 0.87 K/UL — SIGNIFICANT CHANGE UP (ref 0–0.9)
MONOCYTES # BLD AUTO: 0.89 K/UL — SIGNIFICANT CHANGE UP (ref 0–0.9)
MONOCYTES # BLD AUTO: 0.9 K/UL — SIGNIFICANT CHANGE UP (ref 0–0.9)
MONOCYTES # BLD AUTO: 0.92 K/UL — HIGH (ref 0–0.9)
MONOCYTES # BLD AUTO: 0.98 K/UL — HIGH (ref 0–0.9)
MONOCYTES # BLD AUTO: 1 K/UL — HIGH (ref 0–0.9)
MONOCYTES # BLD AUTO: 1.01 K/UL — HIGH (ref 0–0.9)
MONOCYTES # BLD AUTO: 1.14 K/UL — HIGH (ref 0–0.9)
MONOCYTES # BLD AUTO: 1.15 K/UL — HIGH (ref 0–0.9)
MONOCYTES # BLD AUTO: 1.58 K/UL — HIGH (ref 0–0.9)
MONOCYTES NFR BLD AUTO: 10.6 % — SIGNIFICANT CHANGE UP (ref 2–14)
MONOCYTES NFR BLD AUTO: 4 % — SIGNIFICANT CHANGE UP (ref 2–14)
MONOCYTES NFR BLD AUTO: 5.1 % — SIGNIFICANT CHANGE UP (ref 2–14)
MONOCYTES NFR BLD AUTO: 5.5 % — SIGNIFICANT CHANGE UP (ref 2–14)
MONOCYTES NFR BLD AUTO: 5.7 % — SIGNIFICANT CHANGE UP (ref 2–14)
MONOCYTES NFR BLD AUTO: 5.8 % — SIGNIFICANT CHANGE UP (ref 2–14)
MONOCYTES NFR BLD AUTO: 5.9 % — SIGNIFICANT CHANGE UP (ref 2–14)
MONOCYTES NFR BLD AUTO: 6.1 % — SIGNIFICANT CHANGE UP (ref 2–14)
MONOCYTES NFR BLD AUTO: 6.5 % — SIGNIFICANT CHANGE UP (ref 2–14)
MONOCYTES NFR BLD AUTO: 6.8 % — SIGNIFICANT CHANGE UP (ref 2–14)
MONOCYTES NFR BLD AUTO: 6.8 % — SIGNIFICANT CHANGE UP (ref 2–14)
MONOCYTES NFR BLD AUTO: 7.5 % — SIGNIFICANT CHANGE UP (ref 2–14)
MONOCYTES NFR BLD AUTO: 8.3 % — SIGNIFICANT CHANGE UP (ref 2–14)
MSSA DNA SPEC QL NAA+PROBE: SIGNIFICANT CHANGE UP
NEUTROPHILS # BLD AUTO: 10.01 K/UL — HIGH (ref 1.8–7.4)
NEUTROPHILS # BLD AUTO: 10.22 K/UL — HIGH (ref 1.8–7.4)
NEUTROPHILS # BLD AUTO: 10.35 K/UL — HIGH (ref 1.8–7.4)
NEUTROPHILS # BLD AUTO: 10.49 K/UL — HIGH (ref 1.8–7.4)
NEUTROPHILS # BLD AUTO: 11.96 K/UL — HIGH (ref 1.8–7.4)
NEUTROPHILS # BLD AUTO: 12.31 K/UL — HIGH (ref 1.8–7.4)
NEUTROPHILS # BLD AUTO: 12.85 K/UL — HIGH (ref 1.8–7.4)
NEUTROPHILS # BLD AUTO: 15.45 K/UL — HIGH (ref 1.8–7.4)
NEUTROPHILS # BLD AUTO: 15.49 K/UL — HIGH (ref 1.8–7.4)
NEUTROPHILS # BLD AUTO: 22.56 K/UL — HIGH (ref 1.8–7.4)
NEUTROPHILS # BLD AUTO: 28.12 K/UL — HIGH (ref 1.8–7.4)
NEUTROPHILS # BLD AUTO: 7.43 K/UL — HIGH (ref 1.8–7.4)
NEUTROPHILS # BLD AUTO: 8.57 K/UL — HIGH (ref 1.8–7.4)
NEUTROPHILS NFR BLD AUTO: 76.4 % — SIGNIFICANT CHANGE UP (ref 43–77)
NEUTROPHILS NFR BLD AUTO: 78.7 % — HIGH (ref 43–77)
NEUTROPHILS NFR BLD AUTO: 81.8 % — HIGH (ref 43–77)
NEUTROPHILS NFR BLD AUTO: 82.3 % — HIGH (ref 43–77)
NEUTROPHILS NFR BLD AUTO: 82.8 % — HIGH (ref 43–77)
NEUTROPHILS NFR BLD AUTO: 83.5 % — HIGH (ref 43–77)
NEUTROPHILS NFR BLD AUTO: 84 % — HIGH (ref 43–77)
NEUTROPHILS NFR BLD AUTO: 84.1 % — HIGH (ref 43–77)
NEUTROPHILS NFR BLD AUTO: 84.8 % — HIGH (ref 43–77)
NEUTROPHILS NFR BLD AUTO: 86.6 % — HIGH (ref 43–77)
NEUTROPHILS NFR BLD AUTO: 86.8 % — HIGH (ref 43–77)
NEUTROPHILS NFR BLD AUTO: 90.3 % — HIGH (ref 43–77)
NEUTROPHILS NFR BLD AUTO: 90.5 % — HIGH (ref 43–77)
NITRITE UR-MCNC: NEGATIVE — SIGNIFICANT CHANGE UP
NON HDL CHOLESTEROL: 86 MG/DL — SIGNIFICANT CHANGE UP
NRBC # BLD: 0 /100 WBCS — SIGNIFICANT CHANGE UP
NRBC # FLD: 0 K/UL — SIGNIFICANT CHANGE UP
NRBC # FLD: 0.02 K/UL — HIGH
ORGANISM # SPEC MICROSCOPIC CNT: SIGNIFICANT CHANGE UP
PH UR: 6 — SIGNIFICANT CHANGE UP (ref 5–8)
PHOSPHATE SERPL-MCNC: 2.5 MG/DL — SIGNIFICANT CHANGE UP (ref 2.5–4.5)
PHOSPHATE SERPL-MCNC: 2.6 MG/DL — SIGNIFICANT CHANGE UP (ref 2.5–4.5)
PHOSPHATE SERPL-MCNC: 2.7 MG/DL — SIGNIFICANT CHANGE UP (ref 2.5–4.5)
PHOSPHATE SERPL-MCNC: 2.7 MG/DL — SIGNIFICANT CHANGE UP (ref 2.5–4.5)
PHOSPHATE SERPL-MCNC: 2.8 MG/DL — SIGNIFICANT CHANGE UP (ref 2.5–4.5)
PHOSPHATE SERPL-MCNC: 2.8 MG/DL — SIGNIFICANT CHANGE UP (ref 2.5–4.5)
PHOSPHATE SERPL-MCNC: 3 MG/DL — SIGNIFICANT CHANGE UP (ref 2.5–4.5)
PHOSPHATE SERPL-MCNC: 3.2 MG/DL — SIGNIFICANT CHANGE UP (ref 2.5–4.5)
PHOSPHATE SERPL-MCNC: 3.2 MG/DL — SIGNIFICANT CHANGE UP (ref 2.5–4.5)
PHOSPHATE SERPL-MCNC: 3.4 MG/DL — SIGNIFICANT CHANGE UP (ref 2.5–4.5)
PHOSPHATE SERPL-MCNC: 3.5 MG/DL — SIGNIFICANT CHANGE UP (ref 2.5–4.5)
PHOSPHATE SERPL-MCNC: 4.1 MG/DL — SIGNIFICANT CHANGE UP (ref 2.5–4.5)
PLATELET # BLD AUTO: 179 K/UL — SIGNIFICANT CHANGE UP (ref 150–400)
PLATELET # BLD AUTO: 185 K/UL — SIGNIFICANT CHANGE UP (ref 150–400)
PLATELET # BLD AUTO: 189 K/UL — SIGNIFICANT CHANGE UP (ref 150–400)
PLATELET # BLD AUTO: 206 K/UL — SIGNIFICANT CHANGE UP (ref 150–400)
PLATELET # BLD AUTO: 249 K/UL — SIGNIFICANT CHANGE UP (ref 150–400)
PLATELET # BLD AUTO: 266 K/UL — SIGNIFICANT CHANGE UP (ref 150–400)
PLATELET # BLD AUTO: 272 K/UL — SIGNIFICANT CHANGE UP (ref 150–400)
PLATELET # BLD AUTO: 275 K/UL — SIGNIFICANT CHANGE UP (ref 150–400)
PLATELET # BLD AUTO: 276 K/UL — SIGNIFICANT CHANGE UP (ref 150–400)
PLATELET # BLD AUTO: 279 K/UL — SIGNIFICANT CHANGE UP (ref 150–400)
PLATELET # BLD AUTO: 284 K/UL — SIGNIFICANT CHANGE UP (ref 150–400)
PLATELET # BLD AUTO: 286 K/UL — SIGNIFICANT CHANGE UP (ref 150–400)
PLATELET # BLD AUTO: 312 K/UL — SIGNIFICANT CHANGE UP (ref 150–400)
PLATELET # BLD AUTO: 316 K/UL — SIGNIFICANT CHANGE UP (ref 150–400)
POTASSIUM SERPL-MCNC: 3.2 MMOL/L — LOW (ref 3.5–5.3)
POTASSIUM SERPL-MCNC: 3.4 MMOL/L — LOW (ref 3.5–5.3)
POTASSIUM SERPL-MCNC: 3.5 MMOL/L — SIGNIFICANT CHANGE UP (ref 3.5–5.3)
POTASSIUM SERPL-MCNC: 3.8 MMOL/L — SIGNIFICANT CHANGE UP (ref 3.5–5.3)
POTASSIUM SERPL-MCNC: 3.8 MMOL/L — SIGNIFICANT CHANGE UP (ref 3.5–5.3)
POTASSIUM SERPL-MCNC: 4 MMOL/L — SIGNIFICANT CHANGE UP (ref 3.5–5.3)
POTASSIUM SERPL-MCNC: 4.1 MMOL/L — SIGNIFICANT CHANGE UP (ref 3.5–5.3)
POTASSIUM SERPL-MCNC: 4.3 MMOL/L — SIGNIFICANT CHANGE UP (ref 3.5–5.3)
POTASSIUM SERPL-MCNC: 4.7 MMOL/L — SIGNIFICANT CHANGE UP (ref 3.5–5.3)
POTASSIUM SERPL-MCNC: 5.4 MMOL/L — HIGH (ref 3.5–5.3)
POTASSIUM SERPL-SCNC: 3.2 MMOL/L — LOW (ref 3.5–5.3)
POTASSIUM SERPL-SCNC: 3.4 MMOL/L — LOW (ref 3.5–5.3)
POTASSIUM SERPL-SCNC: 3.5 MMOL/L — SIGNIFICANT CHANGE UP (ref 3.5–5.3)
POTASSIUM SERPL-SCNC: 3.8 MMOL/L — SIGNIFICANT CHANGE UP (ref 3.5–5.3)
POTASSIUM SERPL-SCNC: 3.8 MMOL/L — SIGNIFICANT CHANGE UP (ref 3.5–5.3)
POTASSIUM SERPL-SCNC: 4 MMOL/L — SIGNIFICANT CHANGE UP (ref 3.5–5.3)
POTASSIUM SERPL-SCNC: 4.1 MMOL/L — SIGNIFICANT CHANGE UP (ref 3.5–5.3)
POTASSIUM SERPL-SCNC: 4.3 MMOL/L — SIGNIFICANT CHANGE UP (ref 3.5–5.3)
POTASSIUM SERPL-SCNC: 4.7 MMOL/L — SIGNIFICANT CHANGE UP (ref 3.5–5.3)
POTASSIUM SERPL-SCNC: 5.4 MMOL/L — HIGH (ref 3.5–5.3)
PROT SERPL-MCNC: 6.1 G/DL — SIGNIFICANT CHANGE UP (ref 6–8.3)
PROT SERPL-MCNC: 6.4 G/DL — SIGNIFICANT CHANGE UP (ref 6–8.3)
PROT SERPL-MCNC: 6.5 G/DL — SIGNIFICANT CHANGE UP (ref 6–8.3)
PROT SERPL-MCNC: 6.7 G/DL — SIGNIFICANT CHANGE UP (ref 6–8.3)
PROT SERPL-MCNC: 6.7 G/DL — SIGNIFICANT CHANGE UP (ref 6–8.3)
PROT SERPL-MCNC: 6.8 G/DL — SIGNIFICANT CHANGE UP (ref 6–8.3)
PROT SERPL-MCNC: 7.2 G/DL — SIGNIFICANT CHANGE UP (ref 6–8.3)
PROT SERPL-MCNC: 7.5 G/DL — SIGNIFICANT CHANGE UP (ref 6–8.3)
PROT SERPL-MCNC: 7.5 G/DL — SIGNIFICANT CHANGE UP (ref 6–8.3)
PROT UR-MCNC: ABNORMAL
PROTHROM AB SERPL-ACNC: 16.8 SEC — HIGH (ref 10.6–13.6)
RAPID RVP RESULT: SIGNIFICANT CHANGE UP
RBC # BLD: 3.94 M/UL — SIGNIFICANT CHANGE UP (ref 3.8–5.2)
RBC # BLD: 4.08 M/UL — SIGNIFICANT CHANGE UP (ref 3.8–5.2)
RBC # BLD: 4.13 M/UL — SIGNIFICANT CHANGE UP (ref 3.8–5.2)
RBC # BLD: 4.17 M/UL — SIGNIFICANT CHANGE UP (ref 3.8–5.2)
RBC # BLD: 4.26 M/UL — SIGNIFICANT CHANGE UP (ref 3.8–5.2)
RBC # BLD: 4.56 M/UL — SIGNIFICANT CHANGE UP (ref 3.8–5.2)
RBC # BLD: 4.58 M/UL — SIGNIFICANT CHANGE UP (ref 3.8–5.2)
RBC # BLD: 4.65 M/UL — SIGNIFICANT CHANGE UP (ref 3.8–5.2)
RBC # BLD: 4.7 M/UL — SIGNIFICANT CHANGE UP (ref 3.8–5.2)
RBC # BLD: 4.85 M/UL — SIGNIFICANT CHANGE UP (ref 3.8–5.2)
RBC # BLD: 4.88 M/UL — SIGNIFICANT CHANGE UP (ref 3.8–5.2)
RBC # BLD: 5.02 M/UL — SIGNIFICANT CHANGE UP (ref 3.8–5.2)
RBC # BLD: 5.13 M/UL — SIGNIFICANT CHANGE UP (ref 3.8–5.2)
RBC # BLD: 5.17 M/UL — SIGNIFICANT CHANGE UP (ref 3.8–5.2)
RBC # FLD: 15.2 % — HIGH (ref 10.3–14.5)
RBC # FLD: 15.6 % — HIGH (ref 10.3–14.5)
RBC # FLD: 15.8 % — HIGH (ref 10.3–14.5)
RBC # FLD: 15.9 % — HIGH (ref 10.3–14.5)
RBC # FLD: 16.1 % — HIGH (ref 10.3–14.5)
RBC # FLD: 16.6 % — HIGH (ref 10.3–14.5)
RBC # FLD: 16.8 % — HIGH (ref 10.3–14.5)
RBC # FLD: 17 % — HIGH (ref 10.3–14.5)
RBC # FLD: 17.1 % — HIGH (ref 10.3–14.5)
RBC # FLD: 17.2 % — HIGH (ref 10.3–14.5)
RBC # FLD: 17.7 % — HIGH (ref 10.3–14.5)
RSV RNA SPEC QL NAA+PROBE: SIGNIFICANT CHANGE UP
RV+EV RNA SPEC QL NAA+PROBE: SIGNIFICANT CHANGE UP
SARS-COV-2 IGG+IGM SERPL QL IA: 0.4 U/ML — SIGNIFICANT CHANGE UP
SARS-COV-2 IGG+IGM SERPL QL IA: NEGATIVE — SIGNIFICANT CHANGE UP
SARS-COV-2 RNA SPEC QL NAA+PROBE: SIGNIFICANT CHANGE UP
SODIUM SERPL-SCNC: 132 MMOL/L — LOW (ref 135–145)
SODIUM SERPL-SCNC: 133 MMOL/L — LOW (ref 135–145)
SODIUM SERPL-SCNC: 136 MMOL/L — SIGNIFICANT CHANGE UP (ref 135–145)
SODIUM SERPL-SCNC: 138 MMOL/L — SIGNIFICANT CHANGE UP (ref 135–145)
SODIUM SERPL-SCNC: 140 MMOL/L — SIGNIFICANT CHANGE UP (ref 135–145)
SODIUM SERPL-SCNC: 144 MMOL/L — SIGNIFICANT CHANGE UP (ref 135–145)
SODIUM SERPL-SCNC: 146 MMOL/L — HIGH (ref 135–145)
SODIUM SERPL-SCNC: 147 MMOL/L — HIGH (ref 135–145)
SODIUM SERPL-SCNC: 149 MMOL/L — HIGH (ref 135–145)
SODIUM SERPL-SCNC: 150 MMOL/L — HIGH (ref 135–145)
SODIUM SERPL-SCNC: 152 MMOL/L — HIGH (ref 135–145)
SODIUM SERPL-SCNC: 153 MMOL/L — HIGH (ref 135–145)
SODIUM SERPL-SCNC: 154 MMOL/L — HIGH (ref 135–145)
SP GR SPEC: 1.03 — SIGNIFICANT CHANGE UP (ref 1–1.05)
SPECIMEN SOURCE: SIGNIFICANT CHANGE UP
T3 SERPL-MCNC: 59 NG/DL — LOW (ref 80–200)
T4 FREE SERPL-MCNC: 1.7 NG/DL — SIGNIFICANT CHANGE UP (ref 0.9–1.8)
TRIGL SERPL-MCNC: 72 MG/DL — SIGNIFICANT CHANGE UP
TROPONIN T, HIGH SENSITIVITY RESULT: 34 NG/L — SIGNIFICANT CHANGE UP
TROPONIN T, HIGH SENSITIVITY RESULT: 34 NG/L — SIGNIFICANT CHANGE UP
TROPONIN T, HIGH SENSITIVITY RESULT: 36 NG/L — SIGNIFICANT CHANGE UP
TSH SERPL-MCNC: 0.26 UIU/ML — LOW (ref 0.27–4.2)
UROBILINOGEN FLD QL: ABNORMAL
WBC # BLD: 10.88 K/UL — HIGH (ref 3.8–10.5)
WBC # BLD: 12.16 K/UL — HIGH (ref 3.8–10.5)
WBC # BLD: 12.24 K/UL — HIGH (ref 3.8–10.5)
WBC # BLD: 12.32 K/UL — HIGH (ref 3.8–10.5)
WBC # BLD: 12.36 K/UL — HIGH (ref 3.8–10.5)
WBC # BLD: 13.78 K/UL — HIGH (ref 3.8–10.5)
WBC # BLD: 14.96 K/UL — HIGH (ref 3.8–10.5)
WBC # BLD: 15.39 K/UL — HIGH (ref 3.8–10.5)
WBC # BLD: 17.84 K/UL — HIGH (ref 3.8–10.5)
WBC # BLD: 18.71 K/UL — HIGH (ref 3.8–10.5)
WBC # BLD: 24.95 K/UL — HIGH (ref 3.8–10.5)
WBC # BLD: 28.74 K/UL — HIGH (ref 3.8–10.5)
WBC # BLD: 31.13 K/UL — HIGH (ref 3.8–10.5)
WBC # BLD: 9.73 K/UL — SIGNIFICANT CHANGE UP (ref 3.8–10.5)
WBC # FLD AUTO: 10.88 K/UL — HIGH (ref 3.8–10.5)
WBC # FLD AUTO: 12.16 K/UL — HIGH (ref 3.8–10.5)
WBC # FLD AUTO: 12.24 K/UL — HIGH (ref 3.8–10.5)
WBC # FLD AUTO: 12.32 K/UL — HIGH (ref 3.8–10.5)
WBC # FLD AUTO: 12.36 K/UL — HIGH (ref 3.8–10.5)
WBC # FLD AUTO: 13.78 K/UL — HIGH (ref 3.8–10.5)
WBC # FLD AUTO: 14.96 K/UL — HIGH (ref 3.8–10.5)
WBC # FLD AUTO: 15.39 K/UL — HIGH (ref 3.8–10.5)
WBC # FLD AUTO: 17.84 K/UL — HIGH (ref 3.8–10.5)
WBC # FLD AUTO: 18.71 K/UL — HIGH (ref 3.8–10.5)
WBC # FLD AUTO: 24.95 K/UL — HIGH (ref 3.8–10.5)
WBC # FLD AUTO: 28.74 K/UL — HIGH (ref 3.8–10.5)
WBC # FLD AUTO: 31.13 K/UL — HIGH (ref 3.8–10.5)
WBC # FLD AUTO: 9.73 K/UL — SIGNIFICANT CHANGE UP (ref 3.8–10.5)

## 2021-01-01 PROCEDURE — 99232 SBSQ HOSP IP/OBS MODERATE 35: CPT

## 2021-01-01 PROCEDURE — 93306 TTE W/DOPPLER COMPLETE: CPT | Mod: 26

## 2021-01-01 PROCEDURE — 72170 X-RAY EXAM OF PELVIS: CPT | Mod: 26

## 2021-01-01 PROCEDURE — 70450 CT HEAD/BRAIN W/O DYE: CPT | Mod: 26

## 2021-01-01 PROCEDURE — 71045 X-RAY EXAM CHEST 1 VIEW: CPT | Mod: 26

## 2021-01-01 PROCEDURE — 72100 X-RAY EXAM L-S SPINE 2/3 VWS: CPT | Mod: 26

## 2021-01-01 PROCEDURE — 73551 X-RAY EXAM OF FEMUR 1: CPT | Mod: 26,LT

## 2021-01-01 PROCEDURE — 72125 CT NECK SPINE W/O DYE: CPT | Mod: 26

## 2021-01-01 PROCEDURE — 99223 1ST HOSP IP/OBS HIGH 75: CPT

## 2021-01-01 PROCEDURE — 99291 CRITICAL CARE FIRST HOUR: CPT

## 2021-01-01 PROCEDURE — 99233 SBSQ HOSP IP/OBS HIGH 50: CPT

## 2021-01-01 PROCEDURE — 71250 CT THORAX DX C-: CPT | Mod: 26

## 2021-01-01 PROCEDURE — 74176 CT ABD & PELVIS W/O CONTRAST: CPT | Mod: 26

## 2021-01-01 RX ORDER — MAGNESIUM SULFATE 500 MG/ML
2 VIAL (ML) INJECTION ONCE
Refills: 0 | Status: COMPLETED | OUTPATIENT
Start: 2021-01-01 | End: 2021-01-01

## 2021-01-01 RX ORDER — NADOLOL 80 MG/1
20 TABLET ORAL EVERY 12 HOURS
Refills: 0 | Status: DISCONTINUED | OUTPATIENT
Start: 2021-01-01 | End: 2021-01-01

## 2021-01-01 RX ORDER — SODIUM CHLORIDE 9 MG/ML
1000 INJECTION, SOLUTION INTRAVENOUS
Refills: 0 | Status: DISCONTINUED | OUTPATIENT
Start: 2021-01-01 | End: 2021-01-01

## 2021-01-01 RX ORDER — METOPROLOL TARTRATE 50 MG
2.5 TABLET ORAL ONCE
Refills: 0 | Status: COMPLETED | OUTPATIENT
Start: 2021-01-01 | End: 2021-01-01

## 2021-01-01 RX ORDER — SENNA PLUS 8.6 MG/1
2 TABLET ORAL
Qty: 0 | Refills: 0 | DISCHARGE
Start: 2021-01-01

## 2021-01-01 RX ORDER — SENNA PLUS 8.6 MG/1
2 TABLET ORAL AT BEDTIME
Refills: 0 | Status: DISCONTINUED | OUTPATIENT
Start: 2021-01-01 | End: 2021-01-01

## 2021-01-01 RX ORDER — CEFAZOLIN SODIUM 1 G
2000 VIAL (EA) INJECTION EVERY 8 HOURS
Refills: 0 | Status: DISCONTINUED | OUTPATIENT
Start: 2021-01-01 | End: 2021-01-01

## 2021-01-01 RX ORDER — DILTIAZEM HCL 120 MG
30 CAPSULE, EXT RELEASE 24 HR ORAL EVERY 6 HOURS
Refills: 0 | Status: DISCONTINUED | OUTPATIENT
Start: 2021-01-01 | End: 2021-01-01

## 2021-01-01 RX ORDER — IPRATROPIUM/ALBUTEROL SULFATE 18-103MCG
3 AEROSOL WITH ADAPTER (GRAM) INHALATION ONCE
Refills: 0 | Status: COMPLETED | OUTPATIENT
Start: 2021-01-01 | End: 2021-01-01

## 2021-01-01 RX ORDER — NADOLOL 80 MG/1
40 TABLET ORAL EVERY 12 HOURS
Refills: 0 | Status: DISCONTINUED | OUTPATIENT
Start: 2021-01-01 | End: 2021-01-01

## 2021-01-01 RX ORDER — METOPROLOL TARTRATE 50 MG
25 TABLET ORAL ONCE
Refills: 0 | Status: COMPLETED | OUTPATIENT
Start: 2021-01-01 | End: 2021-01-01

## 2021-01-01 RX ORDER — APIXABAN 2.5 MG/1
5 TABLET, FILM COATED ORAL ONCE
Refills: 0 | Status: DISCONTINUED | OUTPATIENT
Start: 2021-01-01 | End: 2021-01-01

## 2021-01-01 RX ORDER — MIRTAZAPINE 45 MG/1
15 TABLET, ORALLY DISINTEGRATING ORAL AT BEDTIME
Refills: 0 | Status: DISCONTINUED | OUTPATIENT
Start: 2021-01-01 | End: 2021-01-01

## 2021-01-01 RX ORDER — METOPROLOL TARTRATE 50 MG
5 TABLET ORAL ONCE
Refills: 0 | Status: COMPLETED | OUTPATIENT
Start: 2021-01-01 | End: 2021-01-01

## 2021-01-01 RX ORDER — POTASSIUM CHLORIDE 20 MEQ
40 PACKET (EA) ORAL EVERY 4 HOURS
Refills: 0 | Status: COMPLETED | OUTPATIENT
Start: 2021-01-01 | End: 2021-01-01

## 2021-01-01 RX ORDER — CEFAZOLIN SODIUM 1 G
VIAL (EA) INJECTION
Refills: 0 | Status: DISCONTINUED | OUTPATIENT
Start: 2021-01-01 | End: 2021-01-01

## 2021-01-01 RX ORDER — DILTIAZEM HCL 120 MG
1 CAPSULE, EXT RELEASE 24 HR ORAL
Qty: 0 | Refills: 0 | DISCHARGE
Start: 2021-01-01

## 2021-01-01 RX ORDER — NADOLOL 80 MG/1
1 TABLET ORAL
Qty: 0 | Refills: 0 | DISCHARGE
Start: 2021-01-01

## 2021-01-01 RX ORDER — ACETAMINOPHEN 500 MG
2 TABLET ORAL
Qty: 0 | Refills: 0 | DISCHARGE
Start: 2021-01-01

## 2021-01-01 RX ORDER — HALOPERIDOL DECANOATE 100 MG/ML
1 INJECTION INTRAMUSCULAR ONCE
Refills: 0 | Status: DISCONTINUED | OUTPATIENT
Start: 2021-01-01 | End: 2021-01-01

## 2021-01-01 RX ORDER — DILTIAZEM HCL 120 MG
60 CAPSULE, EXT RELEASE 24 HR ORAL
Refills: 0 | Status: DISCONTINUED | OUTPATIENT
Start: 2021-01-01 | End: 2021-01-01

## 2021-01-01 RX ORDER — ONDANSETRON 8 MG/1
4 TABLET, FILM COATED ORAL EVERY 8 HOURS
Refills: 0 | Status: DISCONTINUED | OUTPATIENT
Start: 2021-01-01 | End: 2021-01-01

## 2021-01-01 RX ORDER — LANOLIN ALCOHOL/MO/W.PET/CERES
3 CREAM (GRAM) TOPICAL AT BEDTIME
Refills: 0 | Status: DISCONTINUED | OUTPATIENT
Start: 2021-01-01 | End: 2021-01-01

## 2021-01-01 RX ORDER — METOPROLOL TARTRATE 50 MG
25 TABLET ORAL
Refills: 0 | Status: DISCONTINUED | OUTPATIENT
Start: 2021-01-01 | End: 2021-01-01

## 2021-01-01 RX ORDER — APIXABAN 2.5 MG/1
TABLET, FILM COATED ORAL
Refills: 0 | Status: DISCONTINUED | OUTPATIENT
Start: 2021-01-01 | End: 2021-01-01

## 2021-01-01 RX ORDER — SODIUM CHLORIDE 9 MG/ML
500 INJECTION INTRAMUSCULAR; INTRAVENOUS; SUBCUTANEOUS ONCE
Refills: 0 | Status: COMPLETED | OUTPATIENT
Start: 2021-01-01 | End: 2021-01-01

## 2021-01-01 RX ORDER — HYDROMORPHONE HYDROCHLORIDE 2 MG/ML
0.2 INJECTION INTRAMUSCULAR; INTRAVENOUS; SUBCUTANEOUS
Refills: 0 | Status: DISCONTINUED | OUTPATIENT
Start: 2021-01-01 | End: 2021-01-01

## 2021-01-01 RX ORDER — APIXABAN 2.5 MG/1
2.5 TABLET, FILM COATED ORAL EVERY 12 HOURS
Refills: 0 | Status: DISCONTINUED | OUTPATIENT
Start: 2021-01-01 | End: 2021-01-01

## 2021-01-01 RX ORDER — HALOPERIDOL DECANOATE 100 MG/ML
1 INJECTION INTRAMUSCULAR ONCE
Refills: 0 | Status: COMPLETED | OUTPATIENT
Start: 2021-01-01 | End: 2021-01-01

## 2021-01-01 RX ORDER — METOPROLOL TARTRATE 50 MG
50 TABLET ORAL
Refills: 0 | Status: DISCONTINUED | OUTPATIENT
Start: 2021-01-01 | End: 2021-01-01

## 2021-01-01 RX ORDER — APIXABAN 2.5 MG/1
1 TABLET, FILM COATED ORAL
Qty: 0 | Refills: 0 | DISCHARGE
Start: 2021-01-01

## 2021-01-01 RX ORDER — MIRTAZAPINE 45 MG/1
7.5 TABLET, ORALLY DISINTEGRATING ORAL AT BEDTIME
Refills: 0 | Status: DISCONTINUED | OUTPATIENT
Start: 2021-01-01 | End: 2021-01-01

## 2021-01-01 RX ORDER — DILTIAZEM HCL 120 MG
240 CAPSULE, EXT RELEASE 24 HR ORAL DAILY
Refills: 0 | Status: DISCONTINUED | OUTPATIENT
Start: 2021-01-01 | End: 2021-01-01

## 2021-01-01 RX ORDER — POTASSIUM CHLORIDE 20 MEQ
20 PACKET (EA) ORAL ONCE
Refills: 0 | Status: COMPLETED | OUTPATIENT
Start: 2021-01-01 | End: 2021-01-01

## 2021-01-01 RX ORDER — MIRTAZAPINE 45 MG/1
1 TABLET, ORALLY DISINTEGRATING ORAL
Qty: 0 | Refills: 0 | DISCHARGE
Start: 2021-01-01

## 2021-01-01 RX ORDER — NADOLOL 80 MG/1
20 TABLET ORAL ONCE
Refills: 0 | Status: COMPLETED | OUTPATIENT
Start: 2021-01-01 | End: 2021-01-01

## 2021-01-01 RX ORDER — LOSARTAN POTASSIUM 100 MG/1
25 TABLET, FILM COATED ORAL DAILY
Refills: 0 | Status: DISCONTINUED | OUTPATIENT
Start: 2021-01-01 | End: 2021-01-01

## 2021-01-01 RX ORDER — POLYETHYLENE GLYCOL 3350 17 G/17G
17 POWDER, FOR SOLUTION ORAL
Qty: 0 | Refills: 0 | DISCHARGE
Start: 2021-01-01

## 2021-01-01 RX ORDER — SODIUM ZIRCONIUM CYCLOSILICATE 10 G/10G
10 POWDER, FOR SUSPENSION ORAL ONCE
Refills: 0 | Status: COMPLETED | OUTPATIENT
Start: 2021-01-01 | End: 2021-01-01

## 2021-01-01 RX ORDER — ACETAMINOPHEN 500 MG
1000 TABLET ORAL ONCE
Refills: 0 | Status: COMPLETED | OUTPATIENT
Start: 2021-01-01 | End: 2021-01-01

## 2021-01-01 RX ORDER — CEFAZOLIN SODIUM 1 G
2000 VIAL (EA) INJECTION ONCE
Refills: 0 | Status: COMPLETED | OUTPATIENT
Start: 2021-01-01 | End: 2021-01-01

## 2021-01-01 RX ORDER — POLYETHYLENE GLYCOL 3350 17 G/17G
17 POWDER, FOR SOLUTION ORAL DAILY
Refills: 0 | Status: DISCONTINUED | OUTPATIENT
Start: 2021-01-01 | End: 2021-01-01

## 2021-01-01 RX ORDER — ACETAMINOPHEN 500 MG
650 TABLET ORAL EVERY 6 HOURS
Refills: 0 | Status: DISCONTINUED | OUTPATIENT
Start: 2021-01-01 | End: 2021-01-01

## 2021-01-01 RX ORDER — LOSARTAN POTASSIUM 100 MG/1
1 TABLET, FILM COATED ORAL
Qty: 0 | Refills: 0 | DISCHARGE
Start: 2021-01-01

## 2021-01-01 RX ADMIN — LOSARTAN POTASSIUM 25 MILLIGRAM(S): 100 TABLET, FILM COATED ORAL at 05:49

## 2021-01-01 RX ADMIN — APIXABAN 2.5 MILLIGRAM(S): 2.5 TABLET, FILM COATED ORAL at 18:04

## 2021-01-01 RX ADMIN — Medication 100 MILLIGRAM(S): at 22:31

## 2021-01-01 RX ADMIN — NADOLOL 40 MILLIGRAM(S): 80 TABLET ORAL at 17:28

## 2021-01-01 RX ADMIN — APIXABAN 2.5 MILLIGRAM(S): 2.5 TABLET, FILM COATED ORAL at 17:02

## 2021-01-01 RX ADMIN — Medication 25 MILLIGRAM(S): at 23:33

## 2021-01-01 RX ADMIN — NADOLOL 40 MILLIGRAM(S): 80 TABLET ORAL at 06:35

## 2021-01-01 RX ADMIN — SENNA PLUS 2 TABLET(S): 8.6 TABLET ORAL at 22:08

## 2021-01-01 RX ADMIN — LOSARTAN POTASSIUM 25 MILLIGRAM(S): 100 TABLET, FILM COATED ORAL at 06:37

## 2021-01-01 RX ADMIN — SENNA PLUS 2 TABLET(S): 8.6 TABLET ORAL at 23:00

## 2021-01-01 RX ADMIN — POLYETHYLENE GLYCOL 3350 17 GRAM(S): 17 POWDER, FOR SOLUTION ORAL at 14:13

## 2021-01-01 RX ADMIN — Medication 30 MILLIGRAM(S): at 06:24

## 2021-01-01 RX ADMIN — Medication 40 MILLIEQUIVALENT(S): at 14:13

## 2021-01-01 RX ADMIN — NADOLOL 40 MILLIGRAM(S): 80 TABLET ORAL at 17:24

## 2021-01-01 RX ADMIN — HALOPERIDOL DECANOATE 1 MILLIGRAM(S): 100 INJECTION INTRAMUSCULAR at 02:23

## 2021-01-01 RX ADMIN — Medication 30 MILLIGRAM(S): at 12:57

## 2021-01-01 RX ADMIN — Medication 240 MILLIGRAM(S): at 05:53

## 2021-01-01 RX ADMIN — LOSARTAN POTASSIUM 25 MILLIGRAM(S): 100 TABLET, FILM COATED ORAL at 05:51

## 2021-01-01 RX ADMIN — NADOLOL 40 MILLIGRAM(S): 80 TABLET ORAL at 17:45

## 2021-01-01 RX ADMIN — HALOPERIDOL DECANOATE 1 MILLIGRAM(S): 100 INJECTION INTRAMUSCULAR at 23:01

## 2021-01-01 RX ADMIN — APIXABAN 2.5 MILLIGRAM(S): 2.5 TABLET, FILM COATED ORAL at 16:40

## 2021-01-01 RX ADMIN — NADOLOL 40 MILLIGRAM(S): 80 TABLET ORAL at 17:08

## 2021-01-01 RX ADMIN — MIRTAZAPINE 15 MILLIGRAM(S): 45 TABLET, ORALLY DISINTEGRATING ORAL at 21:31

## 2021-01-01 RX ADMIN — Medication 5 MILLIGRAM(S): at 18:14

## 2021-01-01 RX ADMIN — APIXABAN 2.5 MILLIGRAM(S): 2.5 TABLET, FILM COATED ORAL at 17:01

## 2021-01-01 RX ADMIN — SENNA PLUS 2 TABLET(S): 8.6 TABLET ORAL at 22:07

## 2021-01-01 RX ADMIN — MIRTAZAPINE 15 MILLIGRAM(S): 45 TABLET, ORALLY DISINTEGRATING ORAL at 21:19

## 2021-01-01 RX ADMIN — NADOLOL 40 MILLIGRAM(S): 80 TABLET ORAL at 17:29

## 2021-01-01 RX ADMIN — Medication 50 MILLIGRAM(S): at 04:53

## 2021-01-01 RX ADMIN — Medication 30 MILLIGRAM(S): at 16:40

## 2021-01-01 RX ADMIN — SENNA PLUS 2 TABLET(S): 8.6 TABLET ORAL at 22:26

## 2021-01-01 RX ADMIN — APIXABAN 2.5 MILLIGRAM(S): 2.5 TABLET, FILM COATED ORAL at 06:33

## 2021-01-01 RX ADMIN — Medication 30 MILLIGRAM(S): at 17:33

## 2021-01-01 RX ADMIN — NADOLOL 40 MILLIGRAM(S): 80 TABLET ORAL at 06:17

## 2021-01-01 RX ADMIN — LOSARTAN POTASSIUM 25 MILLIGRAM(S): 100 TABLET, FILM COATED ORAL at 05:34

## 2021-01-01 RX ADMIN — APIXABAN 2.5 MILLIGRAM(S): 2.5 TABLET, FILM COATED ORAL at 17:29

## 2021-01-01 RX ADMIN — SODIUM ZIRCONIUM CYCLOSILICATE 10 GRAM(S): 10 POWDER, FOR SUSPENSION ORAL at 11:32

## 2021-01-01 RX ADMIN — SENNA PLUS 2 TABLET(S): 8.6 TABLET ORAL at 22:12

## 2021-01-01 RX ADMIN — NADOLOL 40 MILLIGRAM(S): 80 TABLET ORAL at 17:02

## 2021-01-01 RX ADMIN — NADOLOL 40 MILLIGRAM(S): 80 TABLET ORAL at 17:56

## 2021-01-01 RX ADMIN — MIRTAZAPINE 7.5 MILLIGRAM(S): 45 TABLET, ORALLY DISINTEGRATING ORAL at 22:36

## 2021-01-01 RX ADMIN — Medication 5 MILLIGRAM(S): at 15:10

## 2021-01-01 RX ADMIN — Medication 100 MILLIGRAM(S): at 11:33

## 2021-01-01 RX ADMIN — APIXABAN 2.5 MILLIGRAM(S): 2.5 TABLET, FILM COATED ORAL at 05:35

## 2021-01-01 RX ADMIN — APIXABAN 2.5 MILLIGRAM(S): 2.5 TABLET, FILM COATED ORAL at 05:02

## 2021-01-01 RX ADMIN — NADOLOL 40 MILLIGRAM(S): 80 TABLET ORAL at 05:48

## 2021-01-01 RX ADMIN — APIXABAN 2.5 MILLIGRAM(S): 2.5 TABLET, FILM COATED ORAL at 17:30

## 2021-01-01 RX ADMIN — APIXABAN 2.5 MILLIGRAM(S): 2.5 TABLET, FILM COATED ORAL at 05:26

## 2021-01-01 RX ADMIN — NADOLOL 40 MILLIGRAM(S): 80 TABLET ORAL at 05:59

## 2021-01-01 RX ADMIN — NADOLOL 40 MILLIGRAM(S): 80 TABLET ORAL at 06:21

## 2021-01-01 RX ADMIN — LOSARTAN POTASSIUM 25 MILLIGRAM(S): 100 TABLET, FILM COATED ORAL at 05:47

## 2021-01-01 RX ADMIN — SENNA PLUS 2 TABLET(S): 8.6 TABLET ORAL at 22:31

## 2021-01-01 RX ADMIN — Medication 240 MILLIGRAM(S): at 06:33

## 2021-01-01 RX ADMIN — Medication 5 MILLIGRAM(S): at 18:01

## 2021-01-01 RX ADMIN — NADOLOL 40 MILLIGRAM(S): 80 TABLET ORAL at 05:29

## 2021-01-01 RX ADMIN — POLYETHYLENE GLYCOL 3350 17 GRAM(S): 17 POWDER, FOR SOLUTION ORAL at 12:23

## 2021-01-01 RX ADMIN — APIXABAN 2.5 MILLIGRAM(S): 2.5 TABLET, FILM COATED ORAL at 06:21

## 2021-01-01 RX ADMIN — Medication 60 MILLIGRAM(S): at 05:49

## 2021-01-01 RX ADMIN — Medication 100 MILLIGRAM(S): at 22:26

## 2021-01-01 RX ADMIN — SENNA PLUS 2 TABLET(S): 8.6 TABLET ORAL at 21:31

## 2021-01-01 RX ADMIN — MIRTAZAPINE 15 MILLIGRAM(S): 45 TABLET, ORALLY DISINTEGRATING ORAL at 22:07

## 2021-01-01 RX ADMIN — Medication 240 MILLIGRAM(S): at 05:34

## 2021-01-01 RX ADMIN — MIRTAZAPINE 15 MILLIGRAM(S): 45 TABLET, ORALLY DISINTEGRATING ORAL at 23:00

## 2021-01-01 RX ADMIN — NADOLOL 40 MILLIGRAM(S): 80 TABLET ORAL at 05:02

## 2021-01-01 RX ADMIN — SENNA PLUS 2 TABLET(S): 8.6 TABLET ORAL at 22:09

## 2021-01-01 RX ADMIN — SENNA PLUS 2 TABLET(S): 8.6 TABLET ORAL at 22:00

## 2021-01-01 RX ADMIN — MIRTAZAPINE 15 MILLIGRAM(S): 45 TABLET, ORALLY DISINTEGRATING ORAL at 22:41

## 2021-01-01 RX ADMIN — Medication 240 MILLIGRAM(S): at 06:38

## 2021-01-01 RX ADMIN — Medication 100 MILLIGRAM(S): at 05:51

## 2021-01-01 RX ADMIN — LOSARTAN POTASSIUM 25 MILLIGRAM(S): 100 TABLET, FILM COATED ORAL at 06:17

## 2021-01-01 RX ADMIN — POLYETHYLENE GLYCOL 3350 17 GRAM(S): 17 POWDER, FOR SOLUTION ORAL at 11:27

## 2021-01-01 RX ADMIN — APIXABAN 2.5 MILLIGRAM(S): 2.5 TABLET, FILM COATED ORAL at 05:51

## 2021-01-01 RX ADMIN — POLYETHYLENE GLYCOL 3350 17 GRAM(S): 17 POWDER, FOR SOLUTION ORAL at 12:33

## 2021-01-01 RX ADMIN — Medication 240 MILLIGRAM(S): at 06:35

## 2021-01-01 RX ADMIN — NADOLOL 40 MILLIGRAM(S): 80 TABLET ORAL at 17:32

## 2021-01-01 RX ADMIN — LOSARTAN POTASSIUM 25 MILLIGRAM(S): 100 TABLET, FILM COATED ORAL at 06:33

## 2021-01-01 RX ADMIN — NADOLOL 40 MILLIGRAM(S): 80 TABLET ORAL at 06:26

## 2021-01-01 RX ADMIN — APIXABAN 2.5 MILLIGRAM(S): 2.5 TABLET, FILM COATED ORAL at 06:35

## 2021-01-01 RX ADMIN — SENNA PLUS 2 TABLET(S): 8.6 TABLET ORAL at 21:50

## 2021-01-01 RX ADMIN — Medication 100 MILLIGRAM(S): at 10:31

## 2021-01-01 RX ADMIN — POLYETHYLENE GLYCOL 3350 17 GRAM(S): 17 POWDER, FOR SOLUTION ORAL at 11:42

## 2021-01-01 RX ADMIN — APIXABAN 2.5 MILLIGRAM(S): 2.5 TABLET, FILM COATED ORAL at 17:15

## 2021-01-01 RX ADMIN — APIXABAN 2.5 MILLIGRAM(S): 2.5 TABLET, FILM COATED ORAL at 05:49

## 2021-01-01 RX ADMIN — SODIUM CHLORIDE 60 MILLILITER(S): 9 INJECTION, SOLUTION INTRAVENOUS at 22:37

## 2021-01-01 RX ADMIN — LOSARTAN POTASSIUM 25 MILLIGRAM(S): 100 TABLET, FILM COATED ORAL at 05:54

## 2021-01-01 RX ADMIN — Medication 240 MILLIGRAM(S): at 05:47

## 2021-01-01 RX ADMIN — APIXABAN 2.5 MILLIGRAM(S): 2.5 TABLET, FILM COATED ORAL at 17:24

## 2021-01-01 RX ADMIN — APIXABAN 2.5 MILLIGRAM(S): 2.5 TABLET, FILM COATED ORAL at 05:48

## 2021-01-01 RX ADMIN — NADOLOL 40 MILLIGRAM(S): 80 TABLET ORAL at 06:33

## 2021-01-01 RX ADMIN — Medication 100 MILLIGRAM(S): at 15:45

## 2021-01-01 RX ADMIN — LOSARTAN POTASSIUM 25 MILLIGRAM(S): 100 TABLET, FILM COATED ORAL at 12:57

## 2021-01-01 RX ADMIN — Medication 25 MILLIGRAM(S): at 05:02

## 2021-01-01 RX ADMIN — Medication 3 MILLILITER(S): at 16:11

## 2021-01-01 RX ADMIN — NADOLOL 20 MILLIGRAM(S): 80 TABLET ORAL at 12:07

## 2021-01-01 RX ADMIN — NADOLOL 40 MILLIGRAM(S): 80 TABLET ORAL at 18:08

## 2021-01-01 RX ADMIN — Medication 60 MILLIGRAM(S): at 00:59

## 2021-01-01 RX ADMIN — APIXABAN 2.5 MILLIGRAM(S): 2.5 TABLET, FILM COATED ORAL at 17:07

## 2021-01-01 RX ADMIN — MIRTAZAPINE 15 MILLIGRAM(S): 45 TABLET, ORALLY DISINTEGRATING ORAL at 21:50

## 2021-01-01 RX ADMIN — SODIUM CHLORIDE 60 MILLILITER(S): 9 INJECTION, SOLUTION INTRAVENOUS at 06:43

## 2021-01-01 RX ADMIN — MIRTAZAPINE 15 MILLIGRAM(S): 45 TABLET, ORALLY DISINTEGRATING ORAL at 23:07

## 2021-01-01 RX ADMIN — APIXABAN 2.5 MILLIGRAM(S): 2.5 TABLET, FILM COATED ORAL at 17:32

## 2021-01-01 RX ADMIN — MIRTAZAPINE 15 MILLIGRAM(S): 45 TABLET, ORALLY DISINTEGRATING ORAL at 22:08

## 2021-01-01 RX ADMIN — MIRTAZAPINE 15 MILLIGRAM(S): 45 TABLET, ORALLY DISINTEGRATING ORAL at 22:09

## 2021-01-01 RX ADMIN — NADOLOL 40 MILLIGRAM(S): 80 TABLET ORAL at 05:53

## 2021-01-01 RX ADMIN — NADOLOL 40 MILLIGRAM(S): 80 TABLET ORAL at 06:37

## 2021-01-01 RX ADMIN — MIRTAZAPINE 7.5 MILLIGRAM(S): 45 TABLET, ORALLY DISINTEGRATING ORAL at 22:38

## 2021-01-01 RX ADMIN — NADOLOL 40 MILLIGRAM(S): 80 TABLET ORAL at 05:51

## 2021-01-01 RX ADMIN — Medication 240 MILLIGRAM(S): at 17:32

## 2021-01-01 RX ADMIN — Medication 240 MILLIGRAM(S): at 05:36

## 2021-01-01 RX ADMIN — APIXABAN 2.5 MILLIGRAM(S): 2.5 TABLET, FILM COATED ORAL at 05:54

## 2021-01-01 RX ADMIN — SODIUM CHLORIDE 60 MILLILITER(S): 9 INJECTION, SOLUTION INTRAVENOUS at 17:23

## 2021-01-01 RX ADMIN — APIXABAN 2.5 MILLIGRAM(S): 2.5 TABLET, FILM COATED ORAL at 17:20

## 2021-01-01 RX ADMIN — APIXABAN 2.5 MILLIGRAM(S): 2.5 TABLET, FILM COATED ORAL at 17:45

## 2021-01-01 RX ADMIN — POLYETHYLENE GLYCOL 3350 17 GRAM(S): 17 POWDER, FOR SOLUTION ORAL at 11:05

## 2021-01-01 RX ADMIN — SENNA PLUS 2 TABLET(S): 8.6 TABLET ORAL at 23:07

## 2021-01-01 RX ADMIN — SODIUM CHLORIDE 75 MILLILITER(S): 9 INJECTION, SOLUTION INTRAVENOUS at 07:34

## 2021-01-01 RX ADMIN — APIXABAN 2.5 MILLIGRAM(S): 2.5 TABLET, FILM COATED ORAL at 17:56

## 2021-01-01 RX ADMIN — Medication 5 MILLIGRAM(S): at 07:54

## 2021-01-01 RX ADMIN — SODIUM CHLORIDE 500 MILLILITER(S): 9 INJECTION INTRAMUSCULAR; INTRAVENOUS; SUBCUTANEOUS at 18:25

## 2021-01-01 RX ADMIN — NADOLOL 40 MILLIGRAM(S): 80 TABLET ORAL at 17:16

## 2021-01-01 RX ADMIN — Medication 240 MILLIGRAM(S): at 05:29

## 2021-01-01 RX ADMIN — NADOLOL 40 MILLIGRAM(S): 80 TABLET ORAL at 17:33

## 2021-01-01 RX ADMIN — SENNA PLUS 2 TABLET(S): 8.6 TABLET ORAL at 21:11

## 2021-01-01 RX ADMIN — APIXABAN 2.5 MILLIGRAM(S): 2.5 TABLET, FILM COATED ORAL at 05:53

## 2021-01-01 RX ADMIN — POLYETHYLENE GLYCOL 3350 17 GRAM(S): 17 POWDER, FOR SOLUTION ORAL at 17:47

## 2021-01-01 RX ADMIN — SODIUM CHLORIDE 70 MILLILITER(S): 9 INJECTION, SOLUTION INTRAVENOUS at 14:23

## 2021-01-01 RX ADMIN — LOSARTAN POTASSIUM 25 MILLIGRAM(S): 100 TABLET, FILM COATED ORAL at 06:21

## 2021-01-01 RX ADMIN — Medication 240 MILLIGRAM(S): at 06:21

## 2021-01-01 RX ADMIN — Medication 240 MILLIGRAM(S): at 05:26

## 2021-01-01 RX ADMIN — LOSARTAN POTASSIUM 25 MILLIGRAM(S): 100 TABLET, FILM COATED ORAL at 06:25

## 2021-01-01 RX ADMIN — POLYETHYLENE GLYCOL 3350 17 GRAM(S): 17 POWDER, FOR SOLUTION ORAL at 12:03

## 2021-01-01 RX ADMIN — NADOLOL 40 MILLIGRAM(S): 80 TABLET ORAL at 17:15

## 2021-01-01 RX ADMIN — APIXABAN 2.5 MILLIGRAM(S): 2.5 TABLET, FILM COATED ORAL at 17:19

## 2021-01-01 RX ADMIN — NADOLOL 40 MILLIGRAM(S): 80 TABLET ORAL at 05:26

## 2021-01-01 RX ADMIN — NADOLOL 40 MILLIGRAM(S): 80 TABLET ORAL at 21:07

## 2021-01-01 RX ADMIN — APIXABAN 2.5 MILLIGRAM(S): 2.5 TABLET, FILM COATED ORAL at 18:08

## 2021-01-01 RX ADMIN — NADOLOL 20 MILLIGRAM(S): 80 TABLET ORAL at 22:00

## 2021-01-01 RX ADMIN — HYDROMORPHONE HYDROCHLORIDE 0.2 MILLIGRAM(S): 2 INJECTION INTRAMUSCULAR; INTRAVENOUS; SUBCUTANEOUS at 11:00

## 2021-01-01 RX ADMIN — Medication 50 MILLIGRAM(S): at 17:53

## 2021-01-01 RX ADMIN — Medication 20 MILLIEQUIVALENT(S): at 17:23

## 2021-01-01 RX ADMIN — Medication 100 MILLIGRAM(S): at 06:21

## 2021-01-01 RX ADMIN — NADOLOL 40 MILLIGRAM(S): 80 TABLET ORAL at 17:20

## 2021-01-01 RX ADMIN — APIXABAN 2.5 MILLIGRAM(S): 2.5 TABLET, FILM COATED ORAL at 06:26

## 2021-01-01 RX ADMIN — NADOLOL 40 MILLIGRAM(S): 80 TABLET ORAL at 18:04

## 2021-01-01 RX ADMIN — Medication 100 MILLIGRAM(S): at 17:30

## 2021-01-01 RX ADMIN — NADOLOL 40 MILLIGRAM(S): 80 TABLET ORAL at 17:03

## 2021-01-01 RX ADMIN — APIXABAN 2.5 MILLIGRAM(S): 2.5 TABLET, FILM COATED ORAL at 17:34

## 2021-01-01 RX ADMIN — Medication 30 MILLIGRAM(S): at 11:44

## 2021-01-01 RX ADMIN — Medication 240 MILLIGRAM(S): at 06:17

## 2021-01-01 RX ADMIN — Medication 5 MILLIGRAM(S): at 05:41

## 2021-01-01 RX ADMIN — NADOLOL 40 MILLIGRAM(S): 80 TABLET ORAL at 16:39

## 2021-01-01 RX ADMIN — NADOLOL 20 MILLIGRAM(S): 80 TABLET ORAL at 09:55

## 2021-01-01 RX ADMIN — POLYETHYLENE GLYCOL 3350 17 GRAM(S): 17 POWDER, FOR SOLUTION ORAL at 12:05

## 2021-01-01 RX ADMIN — APIXABAN 2.5 MILLIGRAM(S): 2.5 TABLET, FILM COATED ORAL at 05:29

## 2021-01-01 RX ADMIN — SENNA PLUS 2 TABLET(S): 8.6 TABLET ORAL at 21:19

## 2021-01-01 RX ADMIN — APIXABAN 2.5 MILLIGRAM(S): 2.5 TABLET, FILM COATED ORAL at 06:23

## 2021-01-01 RX ADMIN — APIXABAN 2.5 MILLIGRAM(S): 2.5 TABLET, FILM COATED ORAL at 06:17

## 2021-01-01 RX ADMIN — POLYETHYLENE GLYCOL 3350 17 GRAM(S): 17 POWDER, FOR SOLUTION ORAL at 11:32

## 2021-01-01 RX ADMIN — Medication 2.5 MILLIGRAM(S): at 00:42

## 2021-01-01 RX ADMIN — Medication 100 MILLIGRAM(S): at 06:34

## 2021-01-01 RX ADMIN — MIRTAZAPINE 15 MILLIGRAM(S): 45 TABLET, ORALLY DISINTEGRATING ORAL at 22:13

## 2021-01-01 RX ADMIN — NADOLOL 20 MILLIGRAM(S): 80 TABLET ORAL at 12:44

## 2021-01-01 RX ADMIN — Medication 240 MILLIGRAM(S): at 05:59

## 2021-01-01 RX ADMIN — NADOLOL 40 MILLIGRAM(S): 80 TABLET ORAL at 05:34

## 2021-01-01 RX ADMIN — NADOLOL 40 MILLIGRAM(S): 80 TABLET ORAL at 17:36

## 2021-01-01 RX ADMIN — Medication 100 MILLIGRAM(S): at 01:49

## 2021-01-01 RX ADMIN — Medication 30 MILLIGRAM(S): at 00:48

## 2021-01-01 RX ADMIN — LOSARTAN POTASSIUM 25 MILLIGRAM(S): 100 TABLET, FILM COATED ORAL at 05:58

## 2021-01-01 RX ADMIN — LOSARTAN POTASSIUM 25 MILLIGRAM(S): 100 TABLET, FILM COATED ORAL at 05:26

## 2021-01-01 RX ADMIN — Medication 3 MILLILITER(S): at 12:27

## 2021-01-01 RX ADMIN — SENNA PLUS 2 TABLET(S): 8.6 TABLET ORAL at 22:41

## 2021-01-01 RX ADMIN — Medication 240 MILLIGRAM(S): at 05:54

## 2021-01-01 RX ADMIN — LOSARTAN POTASSIUM 25 MILLIGRAM(S): 100 TABLET, FILM COATED ORAL at 06:35

## 2021-01-01 RX ADMIN — NADOLOL 40 MILLIGRAM(S): 80 TABLET ORAL at 05:47

## 2021-01-01 RX ADMIN — APIXABAN 2.5 MILLIGRAM(S): 2.5 TABLET, FILM COATED ORAL at 05:41

## 2021-01-01 RX ADMIN — APIXABAN 2.5 MILLIGRAM(S): 2.5 TABLET, FILM COATED ORAL at 05:34

## 2021-01-01 RX ADMIN — APIXABAN 2.5 MILLIGRAM(S): 2.5 TABLET, FILM COATED ORAL at 05:58

## 2021-01-01 RX ADMIN — LOSARTAN POTASSIUM 25 MILLIGRAM(S): 100 TABLET, FILM COATED ORAL at 05:29

## 2021-01-01 RX ADMIN — Medication 240 MILLIGRAM(S): at 06:24

## 2021-01-01 RX ADMIN — APIXABAN 2.5 MILLIGRAM(S): 2.5 TABLET, FILM COATED ORAL at 06:34

## 2021-01-01 RX ADMIN — APIXABAN 2.5 MILLIGRAM(S): 2.5 TABLET, FILM COATED ORAL at 05:01

## 2021-01-01 RX ADMIN — Medication 50 GRAM(S): at 11:47

## 2021-01-01 RX ADMIN — HYDROMORPHONE HYDROCHLORIDE 0.2 MILLIGRAM(S): 2 INJECTION INTRAMUSCULAR; INTRAVENOUS; SUBCUTANEOUS at 10:55

## 2021-01-01 RX ADMIN — Medication 240 MILLIGRAM(S): at 05:51

## 2021-01-01 RX ADMIN — Medication 400 MILLIGRAM(S): at 18:11

## 2021-01-01 RX ADMIN — NADOLOL 40 MILLIGRAM(S): 80 TABLET ORAL at 05:36

## 2021-01-01 RX ADMIN — HYDROMORPHONE HYDROCHLORIDE 0.2 MILLIGRAM(S): 2 INJECTION INTRAMUSCULAR; INTRAVENOUS; SUBCUTANEOUS at 23:17

## 2021-01-01 RX ADMIN — MIRTAZAPINE 15 MILLIGRAM(S): 45 TABLET, ORALLY DISINTEGRATING ORAL at 21:59

## 2021-01-01 RX ADMIN — APIXABAN 2.5 MILLIGRAM(S): 2.5 TABLET, FILM COATED ORAL at 17:53

## 2021-01-01 RX ADMIN — Medication 40 MILLIEQUIVALENT(S): at 10:37

## 2021-01-01 RX ADMIN — APIXABAN 2.5 MILLIGRAM(S): 2.5 TABLET, FILM COATED ORAL at 17:47

## 2021-01-01 RX ADMIN — NADOLOL 40 MILLIGRAM(S): 80 TABLET ORAL at 05:49

## 2021-01-01 RX ADMIN — LOSARTAN POTASSIUM 25 MILLIGRAM(S): 100 TABLET, FILM COATED ORAL at 05:36

## 2021-01-01 RX ADMIN — APIXABAN 2.5 MILLIGRAM(S): 2.5 TABLET, FILM COATED ORAL at 05:46

## 2021-01-01 RX ADMIN — APIXABAN 2.5 MILLIGRAM(S): 2.5 TABLET, FILM COATED ORAL at 06:37

## 2021-01-01 RX ADMIN — NADOLOL 40 MILLIGRAM(S): 80 TABLET ORAL at 17:46

## 2021-09-04 NOTE — H&P ADULT - PROBLEM SELECTOR PLAN 8
chronic moderate exacerbation  Hx of HFrEF in 2014  Not on GDMT  lower extremity edema present  Repeat echo

## 2021-09-04 NOTE — ED PROVIDER NOTE - PHYSICAL EXAMINATION
GENERAL: NAD, lying in bed comfortably  HEAD:  Atraumatic, normocephalic  BACK: no midline tenderness throughout entire spine  EYES: conjunctiva and sclera clear  ENT: Dry mucous membranes  NECK: Supple  HEART: Rapid irregularly irregular rate  LUNGS: Unlabored respirations, CTAB  ABDOMEN: Soft, nontender, nondistended  EXTREMITIES: 3+ pitting edema b/l in dependent areas  NERVOUS SYSTEM:  A&Ox1, able to follow simple commands  SKIN: No rashes or lesions

## 2021-09-04 NOTE — H&P ADULT - NSHPSOCIALHISTORY_GEN_ALL_CORE
Does not use tobacco products, consume alcohol or partake in illicit drug use   Lives alone with son and daughter checking in on her daily

## 2021-09-04 NOTE — H&P ADULT - PROBLEM SELECTOR PLAN 1
New  Presented with afib with RVR with HR in the 130s  s/p lopressor IV 5mg  HR initially improved but then patient became agitated and is in 110-120s  [id number] 5, HASBLED 2 New  Presented with afib with RVR with HR in the 130s  s/p lopressor IV 5mg and 25mg PO x 1   HR initially improved but then patient became agitated and is in 110-120s  [id number] 5, HASBLED 2  Eliquis 5mg BID  ECHO in AM New  Presented with afib with RVR with HR in the 130s  s/p lopressor IV 5mg and 25mg PO x 1 - restart metoprolol 25mg BID   HR initially improved but then patient became agitated and is in 110-120s  [id number] 5, HASBLED 2  Eliquis 5mg BID as patient wont cooperate for a weight at this time   ECHO in AM  If HR still remains elevated start cardizem drip and titrate accordingly for  HR <100  Cardiology consult in AM

## 2021-09-04 NOTE — H&P ADULT - NSHPPHYSICALEXAM_GEN_ALL_CORE
PHYSICAL EXAM:  GENERAL: NAD, well-developed, well-nourished  HEAD:  Atraumatic, Normocephalic  EYES: EOMI, PERRL, conjunctiva and sclera clear  NECK: Supple, No JVD  CHEST/LUNG: Clear to auscultation bilaterally; No wheezes, rales or rhonchi  HEART: irregularly irregular ; No murmurs, rubs, or gallops, (+)S1, S2  ABDOMEN: Soft, Nontender, Nondistended; Normal Bowel sounds   EXTREMITIES:  2+ Peripheral Pulses, No clubbing, cyanosis, 2+ bilateral lower extremity edema  PSYCH: normal mood and affect  NEUROLOGY: AAOx1, non-focal  SKIN: No rashes or lesions

## 2021-09-04 NOTE — H&P ADULT - NSHPLABSRESULTS_GEN_ALL_CORE
11.7   12.16 )-----------( 179      ( 04 Sep 2021 18:39 )             36.0       09-04    133<L>  |  95<L>  |  24<H>  ----------------------------<  137<H>  3.8   |  22  |  0.68    Ca    9.4      04 Sep 2021 18:39  Phos  3.4     09-04  Mg     1.70     09-04    TPro  7.5  /  Alb  4.1  /  TBili  1.5<H>  /  DBili  x   /  AST  49<H>  /  ALT  23  /  AlkPhos  88  09-04      CARDIAC MARKERS ( 04 Sep 2021 18:39 )  x     / x     / 406 U/L / x     / 7.7 ng/mL        PT/INR - ( 04 Sep 2021 18:39 )   PT: 16.8 sec;   INR: 1.50 ratio         PTT - ( 04 Sep 2021 18:39 )  PTT:29.8 sec      CXR interpreted by myself: no acute changes    interpreted by radiology:   Head CT: No CT evidence of acute intracranial hemorrhage.  Encephalomalacia/gliosis lower right cerebellar hemisphere, unchanged.  C-spine CT: No acute fracture.  xray pelvis & left femur: There is no acute fracture or dislocation.   xray lumbar spine:  advanced degenerative changes and demineralization significantly limits evaluation. question of anterior loss of height of T12 and L2. 11.7   12.16 )-----------( 179      ( 04 Sep 2021 18:39 )             36.0       09-04    133<L>  |  95<L>  |  24<H>  ----------------------------<  137<H>  3.8   |  22  |  0.68    Ca    9.4      04 Sep 2021 18:39  Phos  3.4     09-04  Mg     1.70     09-04    TPro  7.5  /  Alb  4.1  /  TBili  1.5<H>  /  DBili  x   /  AST  49<H>  /  ALT  23  /  AlkPhos  88  09-04      CARDIAC MARKERS ( 04 Sep 2021 18:39 )  x     / x     / 406 U/L / x     / 7.7 ng/mL        PT/INR - ( 04 Sep 2021 18:39 )   PT: 16.8 sec;   INR: 1.50 ratio         PTT - ( 04 Sep 2021 18:39 )  PTT:29.8 sec    EKG: afib with RVR  CXR interpreted by myself: no acute changes    interpreted by radiology:   Head CT: No CT evidence of acute intracranial hemorrhage.  Encephalomalacia/gliosis lower right cerebellar hemisphere, unchanged.  C-spine CT: No acute fracture.  xray pelvis & left femur: There is no acute fracture or dislocation.   xray lumbar spine:  advanced degenerative changes and demineralization significantly limits evaluation. question of anterior loss of height of T12 and L2.

## 2021-09-04 NOTE — ED PROVIDER NOTE - PROGRESS NOTE DETAILS
Pt found to have afib with RVR rate in the 130s on EKG, will give lopressor 5 mg IVP and 500 cc bolus and re-assess. Pt to be admitted following labs and imaging.

## 2021-09-04 NOTE — ED ADULT TRIAGE NOTE - CHIEF COMPLAINT QUOTE
Pt w/ hx of AFIB not currently on anticoagulant, HTN, Dementia aaox1 at baseline, polish speaking, Per daughter Pt was found on the floor by son.  Pt was conscious, no bleeding or hematomas noted, no head tenderness.  Pt only c/o lower back pain and cannot stand up.  Pt denies chest pain SOB dizziness headache,

## 2021-09-04 NOTE — H&P ADULT - HISTORY OF PRESENT ILLNESS
89 yr old female with a pmh of HTN, HLD, paroxysmal Afib, and dementia AAO x1 at baseline per daughter who has not been taking any of her medication for the past 6 months as per daughter the patient does not feel that she needs them. Presents following an unwitnessed mechanical fall approximately 10:30 am (found 11:30 am by son).     Vitals in the ED found to be in afib with 's -> 72, Tmax 97.9, /75, RR 16 satting 100% RA 89 yr old female with a pmh of HTN, HLD, paroxysmal Afib, and dementia AAO x1 at baseline per daughter who has not been taking any of her medication for the past 6 months as per daughter the patient does not feel that she needs them. Presents following an unwitnessed mechanical fall approximately 10:30 am (found 11:30 am by son).   Per patient she did not have any symptoms prior to having her fall and she did not fully recall having the fall. She denies any complaints when asked further. Repeats that she is 89 yrs old already.    Denies  headache, dizziness, chest pain, palpitations, SOB, abdominal pain, joint pain, diarrhea/constipation, urinary symptoms.     Vitals in the ED found to be in afib with 's -> 72, Tmax 97.9, /75, RR 16 satting 100% RA

## 2021-09-04 NOTE — H&P ADULT - PROBLEM SELECTOR PLAN 3
New  not taking medication for the past 6 months as didn't think she needs treatment   Will need counseling New  EKG afib with RVR  Trop 34->34  likely demand ischemia

## 2021-09-04 NOTE — H&P ADULT - PROBLEM SELECTOR PLAN 4
Patient was intubated by anesthesia personnel in  the OR secondary to difficult intubation status. Patient was then take to scheduled CT scan at 1155. Patient arrived back to OR at 1211 following CT scan for operative procedure.    Chronic moderate exacerbation   /75  Not taking lisinopril nor metoprolol  Restart betablocker New  not taking medication for the past 6 months as didn't think she needs treatment   Will need counseling

## 2021-09-04 NOTE — H&P ADULT - PROBLEM SELECTOR PLAN 7
New  daughter reports patient lives at home alone and they would like to look for assistance/placement  social work consult placed

## 2021-09-04 NOTE — H&P ADULT - PROBLEM SELECTOR PLAN 2
New  Presented with an unwitnessed fall  Xray of left femur, lumbar spine and pelvis with no acute pathology  CT head and cervical spine with no acute findings  PT consult

## 2021-09-04 NOTE — ED ADULT NURSE NOTE - OBJECTIVE STATEMENT
Pt presents to rm 11, A&Ox1- this is patient baseline, primarily polish speaking- daughter translating for patient. ambulatory w/ assistance at baseline, pmhx of HTN, afib, here for evaluation of unwitnessed fall today. As per daughter, son found pt on the floor. Pt c/o back pain only with movement. Daughter also states patient is no longer compliant with her medications and has stopped taking all of the medications that were prescribed to her. Pt tachycardic on monitor at this time- ED providers aware, meds given as ordered. Denies chest pain, shortness of breath, palpitations, diaphoresis, headaches, fevers, dizziness, nausea, vomiting, diarrhea, or urinary symptoms at this time. IV established in left arm with a 20G, labs drawn and sent, call bell in reach, warm blanket provided, bed in lowest position, side rails up x2, MD evaluation in progress, pt on telemetry- afib. Will continue to monitor. Pt presents to rm 11, A&Ox1- this is patient baseline, primarily polish speaking- daughter translating for patient. ambulatory w/ assistance at baseline, pmhx of HTN, afib, here for evaluation of unwitnessed fall today. As per daughter, son found pt on the floor. Pt c/o back pain only with movement. Daughter also states patient is no longer compliant with her medications and has stopped taking all of the medications that were prescribed to her. Pt tachycardic on monitor at this time- ED providers aware, meds given as ordered. Denies chest pain, shortness of breath, palpitations, diaphoresis, headaches, fevers, dizziness, nausea, vomiting, diarrhea, or urinary symptoms at this time. Stage 1 pressure ulcer observed to sacral area. IV established in left arm with a 20G, labs drawn and sent, call bell in reach, warm blanket provided, bed in lowest position, side rails up x2, MD evaluation in progress, pt on telemetry- afib. Will continue to monitor.

## 2021-09-04 NOTE — ED PROVIDER NOTE - OBJECTIVE STATEMENT
89yo F Polish-speaking PMHx dementia (A.O. x1 at baseline), A-fib, HTN who presents after unwitnessed fall. History obtained from daughter at bedside. The patient was found on the floor by the patient's son, she was conscious when found however it was unknown if she had prodromal symptoms nor how long she was down for. The patient herself does not remember the event and is unable to provide further details. Daughter reports she has never had a fall prior. She lives alone and has not taken any of her prescribed medications for over 6 months. The pt reports lower back pain and L pelvic pain, but otherwise has no complaints at this time.

## 2021-09-04 NOTE — ED PROVIDER NOTE - CARE PLAN
1 Principal Discharge DX:	Atrial fibrillation with rapid ventricular response  Secondary Diagnosis:	Failure to thrive in adult

## 2021-09-04 NOTE — H&P ADULT - NSHPREVIEWOFSYSTEMS_GEN_ALL_CORE
REVIEW OF SYSTEMS:    CONSTITUTIONAL: No weakness, fevers or chills  EYES/ENT: No visual changes;  No dysphagia; No sore throat; No rhinorrhea; No sinus pain/pressure  NECK: No pain or stiffness  RESPIRATORY: No cough, wheezing, hemoptysis; No shortness of breath  CARDIOVASCULAR: No chest pain or palpitations; No lower extremity edema  GASTROINTESTINAL: No abdominal or epigastric pain. No nausea, vomiting, or hematemesis; No diarrhea or constipation. No melena or hematochezia.  GENITOURINARY: No dysuria, frequency or hematuria  NEUROLOGICAL: No numbness or weakness  MSK: ambulates independently, presents with fall but denies any complaints to me  SKIN: No itching, burning, rashes, or lesions   All other review of systems is negative unless indicated above.

## 2021-09-04 NOTE — ED PROVIDER NOTE - CLINICAL SUMMARY MEDICAL DECISION MAKING FREE TEXT BOX
89yo F Polish-speaking PMHx dementia (A.O. x1 at baseline), A-fib, HTN who presents after unwitnessed fall. Will obtain CT head and cervical spine, x-rays of pelvis/lumbar spine, basic labs, cardiac enzymes, CK to eval for rhabdo, EKG, TBA.

## 2021-09-04 NOTE — ED ADULT NURSE NOTE - INTERVENTIONS DEFINITIONS
Call bell, personal items and telephone within reach/Non-slip footwear when patient is off stretcher/Physically safe environment: no spills, clutter or unnecessary equipment/Stretcher in lowest position, wheels locked, appropriate side rails in place/Provide visual cue, wrist band, yellow gown, etc./Provide visual clues: red socks

## 2021-09-04 NOTE — ED PROVIDER NOTE - NS ED ROS FT
REVIEW OF SYSTEMS:    CONSTITUTIONAL: No weakness, fevers or chills  EYES/ENT: No visual changes;  No vertigo or throat pain   MOUTH: No oral lesion, moist  NECK: No pain or stiffness  RESPIRATORY: No cough, wheezing, hemoptysis; No shortness of breath  CARDIOVASCULAR: No chest pain or palpitations  GASTROINTESTINAL: No abdominal or epigastric pain. No nausea, vomiting, or hematemesis; No diarrhea or constipation. No melena or hematochezia.  GENITOURINARY: No dysuria, frequency or hematuria  NEUROLOGICAL: +Fall. No numbness or weakness  SKIN: No itching, rashes  PSYCH: no confusion or altered mental status

## 2021-09-04 NOTE — ED PROVIDER NOTE - ATTENDING CONTRIBUTION TO CARE
Assistance with Polish translation provided by daughter at bedside    89F h/o dementia (AO x1 at baseline), A-fib, HTN brought in s/p unwitnessed fall. Pt unable to provide history and does not recall falling. Per daughter, she and her brother call/visit daily and had been in usual state of health. When son arrived today, found sitting on carpet. Per daughter pt with lower back pain and unable to get up/ambulate/bear weight. Pt not illiciting any areas of pain. Pt lives alone without HHA or other assistance. Per adam, has noted gradual mental decline over the past 6 months. Has not been taking any of her medications during this time period.   Gen: awake and alert, nad  CV: tachy and irregular  Pulm: clear lungs  Abd: soft, ntnd  MSK/Ext: b/l LE edema to distal thigh, pain with lifting of L leg, no painful ROM of hips b/l, no c/t/l/s spinal tenderness  MDM: 89F h/o dementia (AO x1 at baseline), A-fib, HTN brought in s/p unwitnessed fall. Pt unable to provide history and does not recall falling. Unclear cause of fall - will get CT head/neck, XR chest, pelvis and femur, pain control, rate control for AFIb with RVR seen on EKG, pro-bnp given unclear cause of LE edema, to be admitted Assistance with Polish translation provided by daughter at bedside    89F h/o dementia (AO x1 at baseline), A-fib, HTN brought in s/p unwitnessed fall. Pt unable to provide history and does not recall falling. Per daughter, she and her brother call/visit daily and had been in usual state of health. When son arrived today, found sitting on carpet. Per daughter pt with lower back pain and unable to get up/ambulate/bear weight. Pt not illiciting any areas of pain. Pt lives alone without HHA or other assistance. Per irinaisabell, has noted gradual mental decline over the past 6 months. Has not been taking any of her medications during this time period.   Gen: awake and alert, nad  CV: tachy and irregular  Pulm: clear lungs  Abd: soft, ntnd  MSK/Ext: b/l LE edema to distal thigh, pain with lifting of L leg, no painful ROM of hips b/l, no c/t/l/s spinal tenderness  MDM: 89F h/o dementia (AO x1 at baseline), A-fib, HTN brought in s/p unwitnessed fall. Pt unable to provide history and does not recall falling. Unclear cause of fall - will get CT head/neck, XR chest, pelvis and femur, pain control, rate control for AFIb with RVR seen on EKG, pro-bnp given unclear cause of LE edema, to be admitted    Upon my evaluation, this patient is at high risk for imminent or life threatening deterioration due to AFib with RVR requiring IVP medicatons for rate control and other active medical issues which require my direct attention, intervention, and personal management.  I have personally spent  >40   discontinuous minutes  of critical care time exclusive of time spent on separate billing procedures. This includes review of laboratory data, radiology results, discussion with primary team, and monitoring for potential decompensation. Interventions were performed as documented above.

## 2021-09-05 NOTE — PROGRESS NOTE ADULT - ASSESSMENT
_________________________________________________________________________________________  ========>>  M E D I C A L   A T T E N D I N G    F O L L O W  U P  N O T E  <<=========  -----------------------------------------------------------------------------------------------------    - Patient seen and examined by me earlier today.   - In summary,  MONTANA HSU is a 89y year old woman admitted post fall at home   - Patient today overall doing ok, comfortable, eating OK.      pt has reportedly been tachycardic, improved with beta blockers        pt has been agitated at times as well, liekly in large part due to her room mate ( ? psychiatric disorders, yelling and wailing,, )     ==================>> REVIEW OF SYSTEM <<=================    limited ROS due to dementia and language     ==================>> PHYSICAL EXAM <<=================    GEN: awake and alert, NAD , comfortable, pleasant, calm at time of exam   HEENT: NCAT, PERRL, MMM, hearing intact  Neck: supple , no JVD appreciated  CVS: S1S2 , Irregular , tachy   PULM: CTA B/L,  no W/R/R appreciated  ABD.: soft. non tender, non distended,  bowel sounds present  Extrem: intact pulses , no edema   PSYCH : normal mood,  not anxious                            ( Note Written / Date of service :  09-05-21 )    ==================>> MEDICATIONS <<====================    MEDICATIONS  (STANDING):  apixaban 2.5 milliGRAM(s) Oral every 12 hours  metoprolol tartrate 50 milliGRAM(s) Oral two times a day    MEDICATIONS  (PRN):  acetaminophen   Tablet .. 650 milliGRAM(s) Oral every 6 hours PRN Temp greater or equal to 38.5C (101.3F), Mild Pain (1 - 3)  aluminum hydroxide/magnesium hydroxide/simethicone Suspension 30 milliLiter(s) Oral every 4 hours PRN Dyspepsia  haloperidol     Tablet 1 milliGRAM(s) Oral once PRN agitation  melatonin 3 milliGRAM(s) Oral at bedtime PRN Insomnia  ondansetron Injectable 4 milliGRAM(s) IV Push every 8 hours PRN Nausea and/or Vomiting    ___________  Active diet:  Diet, Regular:   DASH/TLC Sodium & Cholesterol Restricted (DASH)  1200mL Fluid Restriction ([id number])  ___________________    ==================>> VITAL SIGNS <<==================    T(C): 36.6 (09-05-21 @ 14:43), Max: 36.6 (09-05-21 @ 09:47)  HR: 135 (09-05-21 @ 14:43) (76 - 135) up  to 170s on tele !  BP: 154/61 (09-05-21 @ 17:57) (122/90 - 189/114)  RR: 17 (09-05-21 @ 14:43) (17 - 22)  SpO2: 100% (09-05-21 @ 14:43) (95% - 100%)      ==================>> LAB AND IMAGING <<==================                        11.8   12.36 )-----------( 185      ( 05 Sep 2021 06:29 )             36.5        09-05    132<L>  |  92<L>  |  22  ----------------------------<  140<H>  3.5   |  25  |  0.61    Ca    9.4      05 Sep 2021 06:27  Phos  3.4     09-04  Mg     1.70     09-04    TPro  7.5  /  Alb  4.1  /  TBili  1.5<H>  /  DBili  x   /  AST  49<H>  /  ALT  23  /  AlkPhos  88  09-04    PT/INR - ( 04 Sep 2021 18:39 )   PT: 16.8 sec;   INR: 1.50 ratio    PTT - ( 04 Sep 2021 18:39 )  PTT:29.8 sec              ~~ High Sensitivity Troponin  ~~  36  <<--, 34  <<--, 34  <<--     TSH:      0.26   (09-05-21)           Lipid profile:  (09-05-21)     Total: 152     LDL  : (p)     HDL  :66     TG   :72     HgA1C:   (09-05-21)          (09-05-21)      5.8    ___________________________________________________________________________________  ===============>>  A S S E S S M E N T   A N D   P L A N <<===============  ------------------------------------------------------------------------------------------    · Assessment	  89 yr old female with HTN and paroxysmal afib presenting with afib with RVR and need for placement     Problem/Plan - 1:  ·  Problem: New Atrial fibrillation with rapid ventricular response.       Presented with afib with RVR with HR in the 130s  continue lopressor as ordered   [id number] 5, HASBLED 2  Eliquis as ordered >> to reassess later given history of fall and pt lives a lone at home   ECHO   Cardiology consulted : to follow   Problem/Plan - 2:  ·  Problem: Unwitnessed fall. ( pt lives at home alone)   Xray of left femur, lumbar spine and pelvis with no acute pathology  CT head and cervical spine with no acute findings  PT consult.  fall precautions     Problem/Plan - 3:  ·  Problem: Noncompliance.   reportedly at home not taking medication for the past 6 months as didn't think she needs treatment   Will need counseling.  will need CM / social work eval for safe discharge planing    Problem/Plan - 4:  ·  Problem: Benign essential HTN.   Not taking lisinopril nor metoprolol  Metoprolol BID.    Problem/Plan - 5:  ·  Problem: Dementia.   AAO x1 at baseline per daughter  Re-orientation.  monitor   safe dispo planing    Problem/Plan - 6:  ·  Problem: Social problem.   daughter reports patient lives at home alone and they would like to look for assistance/placement  social work consult placed.    Problem/Plan - 7:  ·  Problem: HFrEF (heart failure with reduced ejection fraction).   chronic systolic heart failure   Repeat echo.  cardio to follow     -GI/DVT Prophylaxis per protocol.    --------------------------------------------  Case discussed with PA    ___________________________  HErica Antonio D.O.  Pager: [phone]

## 2021-09-05 NOTE — ED ADULT NURSE REASSESSMENT NOTE - NS ED NURSE REASSESS COMMENT FT1
Patient /114. Spoke to SCHUYLER Rico, instructed to give 6am meds at 5am. No further interventions at this time.
Patient agitated, HR in 160s-170s. Continues AFIB on monitor. BP unchanged from previous BP.  SCHUYLER Tim made aware, ordering medications for BP, states will look into chart at this time.
Patient pulled off IV line, refusing medications at this time, visibly agitated. Spoke with SCHUYLER Rico, for now hold medication, okay with BP, continue to monitor patient and let her rest. Stretcher in lowest position, wheels locked, appropriate side rails in place, safety maintained.
pt spitting out PO medication. Jaquan schofield. pt with transport team.
Patient continues on cardiac monitor-AFIB, however now in the 130s-140s. Patient refusing blood pressure cuff. SCHUYLER Rico made aware, states continue to monitor, will speak to team. Awaiting further orders.
Report received from JONNY Eisenberg. Patient A&Ox1 to self. polish speaking, remains on cardiac monitor- AFIB. Denies any complaints at this time. Daughter at bedside. Awaiting blood work results. Stretcher in lowest position, wheels locked, appropriate side rails in place, call bell in reach.

## 2021-09-06 NOTE — PHYSICAL THERAPY INITIAL EVALUATION ADULT - LEVEL OF INDEPENDENCE: STAND/SIT, REHAB EVAL
pt. unable to follow instructions and unable to actively participate in PT services/unable to perform

## 2021-09-06 NOTE — PHYSICAL THERAPY INITIAL EVALUATION ADULT - REHAB POTENTIAL, PT EVAL
pt. not placed on skilled PT services secondary to pt. inability to follow instructions and unable to actively participate in PT services. please reconsult if appropriate/feasible/none

## 2021-09-06 NOTE — PHYSICAL THERAPY INITIAL EVALUATION ADULT - MANUAL MUSCLE TESTING RESULTS, REHAB EVAL
pt. unable to follow instructions and unable to actively participate in PT services/not tested due to

## 2021-09-06 NOTE — PHYSICAL THERAPY INITIAL EVALUATION ADULT - DISCHARGE DISPOSITION, PT EVAL
pt. not placed on skilled PT services secondary to pt. inability to follow instructions and unable to actively participate in PT services. please reconsult if appropriate/feasible

## 2021-09-06 NOTE — PHYSICAL THERAPY INITIAL EVALUATION ADULT - ADDITIONAL COMMENTS
pt. unable to follow instructions and unable to actively participate in PT services. no social provided at this time. please consult with case management/social work for social history    Pt. left comfortable in bed with all tubes/lines intact, call bell in reach and in NAD.

## 2021-09-06 NOTE — PROGRESS NOTE ADULT - ASSESSMENT
_________________________________________________________________________________________  ========>>  M E D I C A L   A T T E N D I N G    F O L L O W  U P  N O T E  <<=========  -----------------------------------------------------------------------------------------------------    - Patient seen and examined by me earlier today.   - In summary,  MONTANA HSU is a 89y year old woman admitted post fall at home   - Patient today overall doing ok, comfortable, eating OK.      pt less tachycardic today with CCBs        pt less agitated     ==================>> REVIEW OF SYSTEM <<=================    limited ROS due to dementia and language     ==================>> PHYSICAL EXAM <<=================    GEN: awake and alert, NAD , comfortable, pleasant, calm at time of exam   HEENT: NCAT, PERRL, MMM, hearing intact  Neck: supple , no JVD appreciated  CVS: S1S2 , Irregular , tachy   PULM: CTA B/L,  no W/R/R appreciated  ABD.: soft. non tender, non distended,  bowel sounds present  Extrem: intact pulses , no edema   PSYCH : normal mood,  not anxious    tele HR in 110s                               ( Note written / Date of service 09-06-21 )    ==================>> MEDICATIONS <<====================    apixaban 2.5 milliGRAM(s) Oral every 12 hours  diltiazem    Tablet 30 milliGRAM(s) Oral every 6 hours    MEDICATIONS  (PRN):  acetaminophen   Tablet .. 650 milliGRAM(s) Oral every 6 hours PRN Temp greater or equal to 38.5C (101.3F), Mild Pain (1 - 3)  aluminum hydroxide/magnesium hydroxide/simethicone Suspension 30 milliLiter(s) Oral every 4 hours PRN Dyspepsia  haloperidol     Tablet 1 milliGRAM(s) Oral once PRN agitation  melatonin 3 milliGRAM(s) Oral at bedtime PRN Insomnia  ondansetron Injectable 4 milliGRAM(s) IV Push every 8 hours PRN Nausea and/or Vomiting    ___________  Active diet:  Diet, Regular:   DASH/TLC Sodium & Cholesterol Restricted (DASH)  1200mL Fluid Restriction ([id number])  ___________________    ==================>> VITAL SIGNS <<==================    Height (cm): 152.4  Weight (kg): 58.1  BMI (kg/m2): 25  Vital Signs Last 24 HrsT(C): 36.5 (09-06-21 @ 11:40)  T(F): 97.7 (09-06-21 @ 11:40), Max: 98.7 (09-05-21 @ 22:30)  HR: 120 (09-06-21 @ 11:40) (109 - 128)  BP: 160/90 (09-06-21 @ 17:20)  RR: 17 (09-06-21 @ 11:40) (17 - 18)  SpO2: 99% (09-06-21 @ 11:40) (99% - 100%)       ==================>> LAB AND IMAGING <<==================                        12.3   13.78 )-----------( 206      ( 06 Sep 2021 06:56 )             36.7        09-06    136  |  97<L>  |  32<H>  ----------------------------<  150<H>  4.1   |  20<L>  |  0.84    Ca    9.2      06 Sep 2021 06:56  Phos  3.4     09-04  Mg     1.80     09-06    TPro  7.5  /  Alb  3.9  /  TBili  1.5<H>  /  DBili  x   /  AST  37<H>  /  ALT  20  /  AlkPhos  82  09-06    WBC count:   13.78 <<== ,  12.36 <<== ,  12.16 <<==   Hemoglobin:   12.3 <<==,  11.8 <<==,  11.7 <<==  platelets:  206 <==, 185 <==, 179 <==    Creatinine:  0.84  <<==, 0.61  <<==, 0.68  <<==  Sodium:   136  <==, 132  <==, 133  <==       AST:          37 <== , 49 <==      ALT:        20  <== , 23  <==      AP:        82  <=, 88  <=     Bili:        1.5  <=, 1.5  <=     TSH:      0.26   (09-05-21)           Lipid profile:  (09-05-21)     Total: 152     LDL  : (p)     HDL  :66     TG   :72     HgA1C:   (09-05-21)          (09-05-21)      5.8    ___________________________________________________________________________________  ===============>>  A S S E S S M E N T   A N D   P L A N <<===============  ------------------------------------------------------------------------------------------    · Assessment	  89 yr old female with HTN and paroxysmal afib presenting with afib with RVR and need for placement     Problem/Plan - 1:  ·  Problem: New Atrial fibrillation with rapid ventricular response.       Presented with afib with RVR with HR in the 130s  pt now on Cardizem per cardio   Eliquis as ordered >> to reassess later given history of fall and pt lives a lone at home   ECHO   Cardiology appreciated    Problem/Plan - 2:  ·  Problem: Unwitnessed fall. ( pt lives at home alone)   Xray of left femur, lumbar spine and pelvis with no acute pathology  CT head and cervical spine with no acute findings  PT consult.  fall precautions     Problem/Plan - 3:  ·  Problem: Noncompliance.   reportedly at home not taking medication for the past 6 months as didn't think she needs treatment   Will need counseling.  will need CM / social work eval for safe discharge planing    Problem/Plan - 4:  ·  Problem: Benign essential HTN.   Continue Current medications otherwise and monitor.  cardio following     Problem/Plan - 5:  ·  Problem: Dementia.   AAO x1 at baseline per daughter  Re-orientation.  monitor   safe dispo planing    Problem/Plan - 6:  ·  Problem: Social problem.   daughter reports patient lives at home alone and they would like to look for assistance/placement  social work consult placed.    Problem/Plan - 7:  ·  Problem: HFrEF (heart failure with reduced ejection fraction).   chronic systolic heart failure   Repeat echo.  cardio to follow     -GI/DVT Prophylaxis per protocol.    ___________________________  H. QUENTIN Antonio.  Pager: [phone]    Dr Kasper will cover me 9/7 and 8th.

## 2021-09-07 NOTE — PROGRESS NOTE ADULT - ASSESSMENT
89 yr old female with HTN and paroxysmal afib presenting with afib with RVR and need for placement      Problem/Plan - 1:  ·  Problem: New Atrial fibrillation with rapid ventricular response.       Presented with afib with RVR with HR in the 130s  pt now on Nadolol  per cardio   Eliquis as ordered >> to reassess later given history of fall and pt lives a lone at home   ECHO   Cardiology appreciated     Problem/Plan - 2:  ·  Problem: Unwitnessed fall. ( pt lives at home alone)   Xray of left femur, lumbar spine and pelvis with no acute pathology  CT head and cervical spine with no acute findings  PT consult.  fall precautions      Problem/Plan - 3:  ·  Problem: Noncompliance.   reportedly at home not taking medication for the past 6 months as didn't think she needs treatment   Will need counseling.  will need CM / social work eval for safe discharge planing     Problem/Plan - 4:  ·  Problem: Benign essential HTN.   Continue Current medications otherwise and monitor.  cardio following      Problem/Plan - 5:  ·  Problem: Dementia.   AAO x1 at baseline per daughter  Re-orientation.  monitor   safe dispo planing     Problem/Plan - 6:  ·  Problem: Social problem.   daughter reports patient lives at home alone and they would like to look for assistance/placement  social work consult placed.     Problem/Plan - 7:  ·  Problem: HFrEF (heart failure with reduced ejection fraction).   chronic systolic heart failure   Repeat echo.  cardio to follow     -GI/DVT Prophylaxis per protocol.

## 2021-09-07 NOTE — PROGRESS NOTE ADULT - SUBJECTIVE AND OBJECTIVE BOX
DATE OF SERVICE: 09-07-21 @ 12:21    Subjective: Patient seen and examined. No new events except as noted.     SUBJECTIVE/ROS:  nad      MEDICATIONS:  MEDICATIONS  (STANDING):  apixaban 2.5 milliGRAM(s) Oral every 12 hours  nadolol 20 milliGRAM(s) Oral every 12 hours      PHYSICAL EXAM:  T(C): 36.7 (09-07-21 @ 11:42), Max: 36.7 (09-07-21 @ 06:21)  HR: 129 (09-07-21 @ 11:42) (117 - 130)  BP: 165/98 (09-07-21 @ 11:42) (126/76 - 170/98)  RR: 18 (09-07-21 @ 11:42) (18 - 19)  SpO2: 100% (09-07-21 @ 11:42) (99% - 100%)  Wt(kg): --  I&O's Summary          JVP: Normal  Neck: supple  Lung: clear   CV: S1 S2 , Murmur:  Abd: soft  Ext: No edema  neuro: Awake / alert  Psych: flat affect  Skin: normal``    LABS/DATA:    CARDIAC MARKERS:  CARDIAC MARKERS ( 05 Sep 2021 06:37 )  x     / x     / 288 U/L / x     / 7.1 ng/mL  CARDIAC MARKERS ( 04 Sep 2021 18:39 )  x     / x     / 406 U/L / x     / 7.7 ng/mL                                11.5   10.88 )-----------( 189      ( 07 Sep 2021 05:10 )             35.0     09-07    136  |  99  |  34<H>  ----------------------------<  139<H>  3.5   |  23  |  0.69    Ca    8.9      07 Sep 2021 05:10  Mg     2.20     09-07    TPro  6.8  /  Alb  3.6  /  TBili  1.0  /  DBili  x   /  AST  27  /  ALT  21  /  AlkPhos  73  09-07    proBNP:   Lipid Profile:   HgA1c:   TSH:     TELE:  EKG:

## 2021-09-07 NOTE — CHART NOTE - NSCHARTNOTEFT_GEN_A_CORE
Pt in AF with RVR running the 120's on telemetry.  Pt asymptomatic, VSS, NAD. Labs unremarkable. Discussed with cardiology this AM - changed Cardizem to Nadolol 20 mg PO BID as recommended.  First dose received at 12PM.  Improved HR to 's. Now slowly increasing again, although pt remains asymptomatic, VSS, NAD. Discussed again with cardiology. No changes at this time. Will continue to monitor closely.

## 2021-09-07 NOTE — PROGRESS NOTE ADULT - SUBJECTIVE AND OBJECTIVE BOX
Date of Service  : 09-07-21 @ 16:03    INTERVAL HPI/OVERNIGHT EVENTS: No new concerns .   Vital Signs Last 24 Hrs  T(C): 36.7 (07 Sep 2021 11:42), Max: 36.7 (07 Sep 2021 06:21)  T(F): 98.1 (07 Sep 2021 11:42), Max: 98.1 (07 Sep 2021 11:42)  HR: 129 (07 Sep 2021 11:42) (117 - 130)  BP: 165/98 (07 Sep 2021 11:42) (126/76 - 170/98)  BP(mean): --  RR: 18 (07 Sep 2021 11:42) (18 - 19)  SpO2: 100% (07 Sep 2021 11:42) (99% - 100%)  I&O's Summary    MEDICATIONS  (STANDING):  apixaban 2.5 milliGRAM(s) Oral every 12 hours  nadolol 20 milliGRAM(s) Oral every 12 hours    MEDICATIONS  (PRN):  acetaminophen   Tablet .. 650 milliGRAM(s) Oral every 6 hours PRN Temp greater or equal to 38.5C (101.3F), Mild Pain (1 - 3)  aluminum hydroxide/magnesium hydroxide/simethicone Suspension 30 milliLiter(s) Oral every 4 hours PRN Dyspepsia  haloperidol     Tablet 1 milliGRAM(s) Oral once PRN agitation  melatonin 3 milliGRAM(s) Oral at bedtime PRN Insomnia  ondansetron Injectable 4 milliGRAM(s) IV Push every 8 hours PRN Nausea and/or Vomiting    LABS:                        11.5   10.88 )-----------( 189      ( 07 Sep 2021 05:10 )             35.0     09-07    136  |  99  |  34<H>  ----------------------------<  139<H>  3.5   |  23  |  0.69    Ca    8.9      07 Sep 2021 05:10  Mg     2.20     09-07    TPro  6.8  /  Alb  3.6  /  TBili  1.0  /  DBili  x   /  AST  27  /  ALT  21  /  AlkPhos  73  09-07        CAPILLARY BLOOD GLUCOSE                  Consultant(s) Notes Reviewed:  [x ] YES  [ ] NO    PHYSICAL EXAM:  GENERAL: NAD, well-groomed, well-developed,not in any distress ,  HEAD:  Atraumatic, Normocephalic  EYES: EOMI, PERRLA, conjunctiva and sclera clear  ENMT: No tonsillar erythema, exudates, or enlargement; Moist mucous membranes, Good dentition, No lesions  NECK: Supple, No JVD, Normal thyroid  NERVOUS SYSTEM:  Alert & No focal deficit   CHEST/LUNG: Good air entry bilateral with no  rales, rhonchi, wheezing, or rubs  HEART: Irregular rate and rhythm; No murmurs, rubs, or gallops  ABDOMEN: Soft, Nontender, Nondistended; Bowel sounds present  EXTREMITIES:  2+ Peripheral Pulses, No clubbing, cyanosis, or edema  SKIN: No rashes or lesions    Care Discussed with Consultants/Other Providers [ x] YES  [ ] NO

## 2021-09-07 NOTE — PROGRESS NOTE ADULT - ASSESSMENT
PAF  HR not well controlled  change avn to nadolol   pt on a/c  obtain echo   no evidence of acute MI     HTN  BP stable now  cont to monitor     s/p fall  fall precaution   PT eval

## 2021-09-08 NOTE — PROGRESS NOTE ADULT - SUBJECTIVE AND OBJECTIVE BOX
DATE OF SERVICE: 09-08-21 @ 11:54    Subjective: Patient seen and examined. No new events except as noted.     SUBJECTIVE/ROS:        MEDICATIONS:  MEDICATIONS  (STANDING):  apixaban 2.5 milliGRAM(s) Oral every 12 hours  haloperidol    Injectable 1 milliGRAM(s) IntraMuscular once  nadolol 40 milliGRAM(s) Oral every 12 hours  nadolol 20 milliGRAM(s) Oral once      PHYSICAL EXAM:  T(C): 36.8 (09-08-21 @ 09:50), Max: 36.8 (09-07-21 @ 21:59)  HR: 122 (09-08-21 @ 09:50) (110 - 133)  BP: 156/87 (09-08-21 @ 09:50) (141/91 - 165/98)  RR: 17 (09-08-21 @ 09:50) (17 - 18)  SpO2: 99% (09-08-21 @ 09:50) (99% - 100%)  Wt(kg): --  I&O's Summary          JVP: Normal  Neck: supple  Lung: clear   CV: S1 S2 , Murmur:  Abd: soft  Ext: No edema  neuro: Awake / alert  Psych: flat affect  Skin: normal``    LABS/DATA:    CARDIAC MARKERS:                                12.4   12.32 )-----------( 249      ( 08 Sep 2021 07:17 )             38.3     09-08    138  |  99  |  30<H>  ----------------------------<  165<H>  3.8   |  21<L>  |  0.66    Ca    9.0      08 Sep 2021 07:17  Phos  3.2     09-08  Mg     2.00     09-08    TPro  7.2  /  Alb  4.0  /  TBili  1.5<H>  /  DBili  x   /  AST  29  /  ALT  20  /  AlkPhos  81  09-08    proBNP:   Lipid Profile:   HgA1c:   TSH:     TELE:  EKG:

## 2021-09-08 NOTE — PROGRESS NOTE ADULT - ASSESSMENT
PAF  increase nadolol to 40 bid   pt on a/c  obtain echo   no evidence of acute MI     HTN  BP stable now  cont to monitor     s/p fall  fall precaution   PT eval

## 2021-09-08 NOTE — PROVIDER CONTACT NOTE (CHANGE IN STATUS NOTIFICATION) - ASSESSMENT
Pt had 3 beats of ventricular tachycardia on tele. Pt asymptomatic, no signs or symptoms of chest pain, SOB, generalized pain, or discomfort present. Pt's vitals at 00:05 was 97.5 axillary, 110 HR on cardiac monitor, 149/80, 17RR, 10% on room air.

## 2021-09-08 NOTE — PROGRESS NOTE ADULT - ASSESSMENT
89 yr old female with HTN and paroxysmal afib presenting with afib with RVR and need for placement      Problem/Plan - 1:  ·  Problem: New Atrial fibrillation with rapid ventricular response.       Presented with afib with RVR with HR in the 130s  pt now on Nadolol  per cardio   Eliquis as ordered >> to reassess later given history of fall and pt lives a lone at home   Cardiology help appreciated  TTE pending.      Problem/Plan - 2:  ·  Problem: Unwitnessed fall. ( pt lives at home alone)   Xray of left femur, lumbar spine and pelvis with no acute pathology  CT head and cervical spine with no acute findings  PT consult.  fall precautions      Problem/Plan - 3:  ·  Problem: Noncompliance.   reportedly at home not taking medication for the past 6 months as didn't think she needs treatment   Will need counseling.  will need CM / social work eval for safe discharge planing     Problem/Plan - 4:  ·  Problem: Benign essential HTN.   Continue Current medications otherwise and monitor.  cardio following      Problem/Plan - 5:  ·  Problem: Dementia.   AAO x1 at baseline per daughter  Re-orientation.  monitor   safe dispo planing     Problem/Plan - 6:  ·  Problem: Social problem.   daughter reports patient lives at home alone and they would like to look for assistance/placement  social work consult placed.     Problem/Plan - 7:  ·  Problem: HFrEF (heart failure with reduced ejection fraction).   chronic systolic heart failure   Repeat echo.  cardio to follow     -GI/DVT Prophylaxis per protocol.

## 2021-09-08 NOTE — PROGRESS NOTE ADULT - SUBJECTIVE AND OBJECTIVE BOX
Date of Service  : 09-08-21 @ 08:03    INTERVAL HPI/OVERNIGHT EVENTS: No new concerns per staff .   Vital Signs Last 24 Hrs  T(C): 36.4 (08 Sep 2021 05:35), Max: 36.8 (07 Sep 2021 21:59)  T(F): 97.5 (08 Sep 2021 05:35), Max: 98.2 (07 Sep 2021 21:59)  HR: 126 (08 Sep 2021 07:00) (110 - 133)  BP: 165/98 (08 Sep 2021 07:00) (141/91 - 165/98)  BP(mean): --  RR: 18 (08 Sep 2021 05:35) (17 - 18)  SpO2: 100% (08 Sep 2021 05:35) (99% - 100%)  I&O's Summary    MEDICATIONS  (STANDING):  apixaban 2.5 milliGRAM(s) Oral every 12 hours  haloperidol    Injectable 1 milliGRAM(s) IntraMuscular once  nadolol 20 milliGRAM(s) Oral every 12 hours    MEDICATIONS  (PRN):  acetaminophen   Tablet .. 650 milliGRAM(s) Oral every 6 hours PRN Temp greater or equal to 38.5C (101.3F), Mild Pain (1 - 3)  aluminum hydroxide/magnesium hydroxide/simethicone Suspension 30 milliLiter(s) Oral every 4 hours PRN Dyspepsia  melatonin 3 milliGRAM(s) Oral at bedtime PRN Insomnia  ondansetron Injectable 4 milliGRAM(s) IV Push every 8 hours PRN Nausea and/or Vomiting    LABS:                        12.4   12.32 )-----------( 249      ( 08 Sep 2021 07:17 )             38.3     09-07    136  |  99  |  34<H>  ----------------------------<  139<H>  3.5   |  23  |  0.69    Ca    8.9      07 Sep 2021 05:10  Mg     2.20     09-07    TPro  6.8  /  Alb  3.6  /  TBili  1.0  /  DBili  x   /  AST  27  /  ALT  21  /  AlkPhos  73  09-07        CAPILLARY BLOOD GLUCOSE                  Consultant(s) Notes Reviewed:  [x ] YES  [ ] NO    PHYSICAL EXAM:  GENERAL: NAD, well-groomed, well-developed,not in any distress ,  HEAD:  Atraumatic, Normocephalic  NECK: Supple, No JVD, Normal thyroid  NERVOUS SYSTEM:  Alert   CHEST/LUNG: Good air entry bilateral with no  rales, rhonchi, wheezing, or rubs  HEART: Regular rate and rhythm; No murmurs, rubs, or gallops  ABDOMEN: Soft, Nontender, Nondistended; Bowel sounds present  EXTREMITIES:  2+ Peripheral Pulses, No clubbing, cyanosis, or edema    Care Discussed with Consultants/Other Providers [ x] YES  [ ] NO

## 2021-09-09 NOTE — PROGRESS NOTE ADULT - ASSESSMENT
_________________________________________________________________________________________  ========>>  M E D I C A L   A T T E N D I N G    F O L L O W  U P  N O T E  <<=========  -----------------------------------------------------------------------------------------------------    - Patient seen and examined by me earlier today.   - In summary,  MONTANA HSU is a 89y year old woman admitted post fall at home   - Patient today overall doing ok, comfortable, eating fairly per RN     pt remains in Afib despite 3 different med regimens..     ==================>> REVIEW OF SYSTEM <<=================    limited ROS due to dementia / AMS    ==================>> PHYSICAL EXAM <<=================    GEN: awake and alert, NAD , comfortable, pleasant, calm at time of exam   HEENT: NCAT, PERRL, MMM, hearing intact  Neck: supple , no JVD appreciated  CVS: S1S2 , Irregular , tachy   PULM: CTA B/L,  no W/R/R appreciated  ABD.: soft. non tender, non distended,  bowel sounds present  Extrem: intact pulses , no edema   PSYCH : normal mood,  not anxious                             ( Note written / Date of service 09-09-21 )    ==================>> MEDICATIONS <<====================    apixaban 2.5 milliGRAM(s) Oral every 12 hours  haloperidol    Injectable 1 milliGRAM(s) IntraMuscular once  losartan 25 milliGRAM(s) Oral daily  nadolol 40 milliGRAM(s) Oral every 12 hours    MEDICATIONS  (PRN):  acetaminophen   Tablet .. 650 milliGRAM(s) Oral every 6 hours PRN Temp greater or equal to 38.5C (101.3F), Mild Pain (1 - 3)  aluminum hydroxide/magnesium hydroxide/simethicone Suspension 30 milliLiter(s) Oral every 4 hours PRN Dyspepsia  melatonin 3 milliGRAM(s) Oral at bedtime PRN Insomnia  ondansetron Injectable 4 milliGRAM(s) IV Push every 8 hours PRN Nausea and/or Vomiting    ___________  Active diet:  Diet, Regular:   DASH/TLC Sodium & Cholesterol Restricted (DASH)  1200mL Fluid Restriction ([id number])  ___________________    ==================>> VITAL SIGNS <<==================    Vital Signs Last 24 HrsT(C): 36.3 (09-09-21 @ 12:28)  T(F): 97.4 (09-09-21 @ 12:28), Max: 97.5 (09-08-21 @ 20:38)  HR: 110 (09-09-21 @ 12:28) (105 - 118)  BP: 150/92 (09-09-21 @ 12:28)  RR: 17 (09-09-21 @ 12:28) (16 - 18)  SpO2: 98% (09-09-21 @ 12:28) (97% - 98%)       ==================>> LAB AND IMAGING <<==================                        13.6   12.24 )-----------( 266      ( 09 Sep 2021 06:51 )             41.8        09-09    136  |  97<L>  |  43<H>  ----------------------------<  184<H>  4.0   |  18<L>  |  0.81    Ca    8.9      09 Sep 2021 06:51  Phos  4.1     09-09  Mg     2.10     09-09    TPro  7.2  /  Alb  3.7  /  TBili  2.0<H>  /  DBili  x   /  AST  32  /  ALT  23  /  AlkPhos  84  09-09    WBC count:   12.24 <<== ,  12.32 <<== ,  10.88 <<== ,  13.78 <<== ,  12.36 <<==   Hemoglobin:   13.6 <<==,  12.4 <<==,  11.5 <<==,  12.3 <<==,  11.8 <<==  platelets:  266 <==, 249 <==, 189 <==, 206 <==, 185 <==, 179 <==    Creatinine:  0.81  <<==, 0.66  <<==, 0.69  <<==, 0.84  <<==, 0.61  <<==, 0.68  <<==  Sodium:   136  <==, 138  <==, 136  <==, 136  <==, 132  <==, 133  <==       AST:          32 <== , 29 <== , 27 <== , 37 <==      ALT:        23  <== , 20  <== , 21  <== , 20  <==      AP:        84  <=, 81  <=, 73  <=, 82  <=     Bili:        2.0  <=, 1.5  <=, 1.0  <=, 1.5  <=    < from: Transthoracic Echocardiogram (09.08.21 @ 00:16) >  CONCLUSIONS:  1. Mitral annular calcification, otherwise normal mitral  valve. Mild-moderate mitral regurgitation.  2. Calcified trileaflet aortic valve with normal opening.  Mild aortic regurgitation.  3. Moderately dilated left atrium.  LA volume index = 44 cc/m2.  4. Normal left ventricular internal dimensions and wall thicknesses.  5. Endocardium not well visualized; grossly moderate global  left ventricular systolic dysfunction.  6. Severe right atrial enlargement.  7. Normal right ventricular size and function.  8. Estimated right ventricular systolic pressure equals 63  mm Hg, assuming right atrial pressure equals 10 mm Hg,  consistent with severe pulmonary hypertension.  ------------------------------------------------------------------------  Confirmed on  9/8/2021 - 17:45:25 by Ermias Camara M.D.,  [facility], [place]  ------------------------------------------------------------------------  < end of copied text >  ___________________________________________________________________________________  ===============>>  A S S E S S M E N T   A N D   P L A N <<===============  ------------------------------------------------------------------------------------------    · Assessment	  89 yr old female with HTN and paroxysmal afib presenting with afib with RVR and need for placement     Problem/Plan - 1:  ·  Problem: New Atrial fibrillation with rapid ventricular response.       Presented with afib with RVR with HR in the 130s  pt post 3 meds with no significant response..   EP called to [facility] for DCC  Eliquis as ordered >> to reassess later given history of fall and pt lives a lone at home   ECHO as above showing likely chronic systolic CHF and severe pulmonary HTN  Cardiology appreciated  med optimization per cardio / EP    Problem/Plan - 2:  ·  Problem: Unwitnessed fall. ( pt lives at home alone)   Xray of left femur, lumbar spine and pelvis with no acute pathology  CT head and cervical spine with no acute findings  PT consult.  fall precautions    OOB to chair daily as able     Problem/Plan - 3:  ·  Problem: Benign essential HTN.   Continue Current medications otherwise and monitor.  cardio following     Problem/Plan - 4:  ·  Problem: Dementia.   AAO x1 at baseline per daughter  Re-orientation.  monitor   safe dispo planing ( pt reportedly lived alone at home)     Problem/Plan - 5:  ·  Problem: Social problem.   daughter reports patient lives at home alone and they would like to look for assistance/placement  social work / CM f/u    Problem/Plan - 6:  ·  Problem: HFrEF (heart failure with reduced ejection fraction).   chronic systolic heart failure   Repeat echo. noted   cardio following    -GI/DVT Prophylaxis per protocol.    ___________________________  H. QUENTIN Antonio.  Pager: [phone]

## 2021-09-09 NOTE — PROGRESS NOTE ADULT - ASSESSMENT
PAF  cont nadolol to 40 bid for now   pt on a/c  echo shows moderate LV dysfunction   no evidence of acute MI     Chronic systolic CHF  likely NICM from afib with rvr   cont BB   add losartan   EP consult is recommended to consider for DCCV     HTN  BP stable now  cont to monitor     s/p fall  fall precaution   PT eval

## 2021-09-09 NOTE — CONSULT NOTE ADULT - ASSESSMENT
Patient is a 89y old  female with past medical history of HTN, paroxysmal atrial fibrillation and dementia who was admitted for an unwitnessed fall. Patient lives alone and was found by her son on the floor. Daughter Crystal who is the HCP states that her mother's dementia has progressed significantly over the last few months and has been refusing all medications and outpatient doctor visits for over one year. In the ED and on telemetry she was found to be in atrial fibrillation w/RVR (130's - 160 bpm).  Now on Nadolol with much better rate control. ECHO with mild to moderate MR and moderate LV systolic function with LVEF 40%.  Stress test 2014 with EF 31%   EP consulted for possible DCCV however, her daughter wants no intervention other than medications.   Plan:  continue to monitor on telemetry.            Continue rate control strategy with Nadolol however, can use Amiodarone if necessary for better rate control.            No SHAHID/DCCV as per daughter's wishes     
PAF  HR not well controlled  will dc BB and try cardizem   pt on a/c  obtain echo   no evidence of acute MI     HTN  BP stable now  cont to monitor     s/p fall  fall precaution   PT eval

## 2021-09-09 NOTE — CONSULT NOTE ADULT - ATTENDING COMMENTS
89 y/o female with past medical history of HTN, paroxysmal atrial fibrillation and dementia who was admitted for an unwitnessed fall. Patient lives alone and was found by her son on the floor. Daughter Crystal who is the HCP states that her mother's dementia has progressed significantly over the last few months and has been refusing all medications and outpatient doctor visits for over one year. In the ED and on telemetry she was found to be in atrial fibrillation w/RVR (130's - 160 bpm).  Now on Nadolol with much better rate control. ECHO with mild to moderate MR and moderate LV systolic function with LVEF 40%.  Stress test 2014 with EF 31%. Daughter declining any procedures for rhythm control, including DCCV, which is reasonable. Continue with rate control strategy. Can consider long term use of Amiodarone for rhythm control though risk of stroke is elevated with conversion to SR.

## 2021-09-09 NOTE — CONSULT NOTE ADULT - SUBJECTIVE AND OBJECTIVE BOX
CHIEF COMPLAINT:Patient is a 89y old  Female who presents with a chief complaint of fall (05 Sep 2021 18:44)      HISTORY OF PRESENT ILLNESS:  89 female with PAF, Dementia admitted s/p fall   Due to her mental status, subjective information were not able to be obtained from the patient. History was obtained, to the extent possible, from review of the chart and collateral sources of information.       PAST MEDICAL & SURGICAL HISTORY:  PAF (paroxysmal atrial fibrillation)    HTN (hypertension)    NO SIGNIFICANT PAST SURGICAL HISTORY            MEDICATIONS:  apixaban 2.5 milliGRAM(s) Oral every 12 hours  metoprolol tartrate 50 milliGRAM(s) Oral two times a day        acetaminophen   Tablet .. 650 milliGRAM(s) Oral every 6 hours PRN  haloperidol     Tablet 1 milliGRAM(s) Oral once PRN  melatonin 3 milliGRAM(s) Oral at bedtime PRN  ondansetron Injectable 4 milliGRAM(s) IV Push every 8 hours PRN    aluminum hydroxide/magnesium hydroxide/simethicone Suspension 30 milliLiter(s) Oral every 4 hours PRN          FAMILY HISTORY:  No pertinent family history in first degree relatives        Non-contributory    SOCIAL HISTORY:    No tobacco, drugs or etoh    Allergies    No Known Allergies    Intolerances    	    REVIEW OF SYSTEMS:  as above  The rest of the 14 points ROS reviewed and except above they are unremarkable.        PHYSICAL EXAM:  T(C): 36.6 (09-06-21 @ 04:50), Max: 37.1 (09-05-21 @ 22:30)  HR: 128 (09-06-21 @ 04:50) (76 - 135)  BP: 106/88 (09-06-21 @ 05:30) (106/88 - 185/111)  RR: 18 (09-06-21 @ 04:50) (17 - 22)  SpO2: 99% (09-06-21 @ 04:50) (95% - 100%)  Wt(kg): --  I&O's Summary      JVP: Normal  Neck: supple  Lung: clear   CV: S1 S2 , Murmur:  Abd: soft  Ext: No edema  neuro: Awake / alert  Psych: flat affect  Skin: normal      LABS/DATA:    TELEMETRY: 	  afib with rvr   ECG:  	   	  CARDIAC MARKERS:                        36 <<== 09-05-21 @ 06:37                34 <<== 09-04-21 @ 19:35                34 <<== 09-04-21 @ 18:39                              11.8   12.36 )-----------( 185      ( 05 Sep 2021 06:29 )             36.5     09-05    132<L>  |  92<L>  |  22  ----------------------------<  140<H>  3.5   |  25  |  0.61    Ca    9.4      05 Sep 2021 06:27  Phos  3.4     09-04  Mg     1.70     09-04    TPro  7.5  /  Alb  4.1  /  TBili  1.5<H>  /  DBili  x   /  AST  49<H>  /  ALT  23  /  AlkPhos  88  09-04    proBNP:   Lipid Profile:   HgA1c:   TSH:           
Patient is a 89y old  female with past medical history of HTN, paroxysmal atrial fibrillation and dementia who was admitted for an unwitnessed fall. Patient lives alone and was found by her son on the floor. Daughter Crystal who is the HCP states that her mother's dementia has progressed significantly over the last few months and has been refusing all medications and outpatient doctor visits for over one year. In the ED and on telemetry she was found to be in atrial fibrillation w/RVR (130's - 160 bpm).  Now on Nadolol with much better rate control. EP consulted for possible DCCV however, her daughter wants no intervention other than medication.        PAST MEDICAL & SURGICAL HISTORY:  PAF (paroxysmal atrial fibrillation)  HTN (hypertension)    NO SIGNIFICANT PAST SURGICAL HISTORY    MEDICATIONS  (STANDING):  apixaban 2.5 milliGRAM(s) Oral every 12 hours  haloperidol    Injectable 1 milliGRAM(s) IntraMuscular once  losartan 25 milliGRAM(s) Oral daily  nadolol 40 milliGRAM(s) Oral every 12 hours    MEDICATIONS  (PRN):  acetaminophen   Tablet .. 650 milliGRAM(s) Oral every 6 hours PRN Temp greater or equal to 38.5C (101.3F), Mild Pain (1 - 3)  aluminum hydroxide/magnesium hydroxide/simethicone Suspension 30 milliLiter(s) Oral every 4 hours PRN Dyspepsia  melatonin 3 milliGRAM(s) Oral at bedtime PRN Insomnia  ondansetron Injectable 4 milliGRAM(s) IV Push every 8 hours PRN Nausea and/or Vomiting      FAMILY HISTORY:  No pertinent family history in first degree relatives        SOCIAL HISTORY: Patient has 2 children.  Polish speaking. Lives alone.     CIGARETTES: never  DRUGS:  no  ALCOHOL:  no    REVIEW OF SYSTEMS:   History from daughter and chart.     CONSTITUTIONAL: No fever, weight loss, chills, shakes, or fatigue  EYES: No eye pain, visual disturbances, or discharge  ENMT:  No difficulty hearing, tinnitus, vertigo; No sinus or throat pain  NECK: No pain or stiffness  BREASTS: No pain, masses, or nipple discharge  RESPIRATORY: No cough, wheezing, hemoptysis, or shortness of breath  CARDIOVASCULAR: No chest pain, dyspnea, palpitations, dizziness, syncope, paroxysmal nocturnal dyspnea, orthopnea, or arm or leg swelling  GASTROINTESTINAL: No abdominal  or epigastric pain, nausea, vomiting, hematemesis, diarrhea, constipation, melena or bright red blood.  GENITOURINARY: No dysuria, nocturia, hematuria, or urinary incontinence  NEUROLOGICAL: No headaches, slurred speech, limb weakness, loss of strength, numbness, or tremors  + memory loss   SKIN: No itching, burning, rashes, or lesions   LYMPH NODES: No enlarged glands  ENDOCRINE: No heat or cold intolerance, or hair loss  MUSCULOSKELETAL: No joint pain or swelling, muscle, back, or extremity pain  PSYCHIATRIC: No depression, anxiety, or difficulty sleeping  HEME/LYMPH: No easy bruising or bleeding gums  ALLERGY AND IMMUNOLOGIC: No hives or rash.      Vital Signs Last 24 Hrs  T(C): 36.3 (09 Sep 2021 12:28), Max: 36.4 (08 Sep 2021 20:38)  T(F): 97.4 (09 Sep 2021 12:28), Max: 97.5 (08 Sep 2021 20:38)  HR: 110 (09 Sep 2021 12:28) (105 - 118)  BP: 150/92 (09 Sep 2021 12:28) (150/92 - 158/85)  BP(mean): --  RR: 17 (09 Sep 2021 12:28) (16 - 18)  SpO2: 98% (09 Sep 2021 12:28) (97% - 98%)    PHYSICAL EXAM:    GENERAL: In no apparent distress, well nourished, and hydrated. Speaking in polish. Not following commands  HEAD:  Atraumatic, Normocephalic  EYES: EOMI, PERRLA, conjunctiva and sclera clear  ENMT: patient would not cooperate with the exam  NECK: Supple and normal thyroid.  No JVD or carotid bruit.  Carotid pulse is 2+ bilaterally.  HEART: Irregular rate and rhythm; No murmurs, rubs, or gallops.  PULMONARY: Poor effort but no rales, wheezing, or rhonchi bilaterally.  ABDOMEN: Soft, Nontender, Nondistended; Bowel sounds present  EXTREMITIES:  2+ Peripheral Pulses, No clubbing, cyanosis, or edema  LYMPH: No lymphadenopathy noted  NEUROLOGICAL: dementia A&O x 1 (name)          INTERPRETATION OF TELEMETRY:  atrial fibrillation w/ventricular rates low 100's    ECG: Atrial fibrillation with  bpm. QRSd 90ms.         LABS:                        13.6   12.24 )-----------( 266      ( 09 Sep 2021 06:51 )             41.8     09-09    136  |  97<L>  |  43<H>  ----------------------------<  184<H>  4.0   |  18<L>  |  0.81    Ca    8.9      09 Sep 2021 06:51  Phos  4.1     09-09  Mg     2.10     09-09    TPro  7.2  /  Alb  3.7  /  TBili  2.0<H>  /  DBili  x   /  AST  32  /  ALT  23  /  AlkPhos  84  09-09        RADIOLOGY & ADDITIONAL STUDIES:  PREVIOUS DIAGNOSTIC TESTING:      < from: Xray Chest 1 View- PORTABLE-Urgent (Xray Chest 1 View- PORTABLE-Urgent .) (09.04.21 @ 19:11) >  EXAM:  XR CHEST PORTABLE URGENT 1V        PROCEDURE DATE:  Sep  4 2021         INTERPRETATION:  EXAMINATION: XR CHEST URGENT    CLINICAL INDICATION: Traumatic fall    TECHNIQUE: X-ray single frontal chest.    COMPARISON: Prior chest x-ray dated 10/2/2014    FINDINGS:  Trachea is midline.    The heart is borderline enlarged.  There are focal areas of linear atelectasis in the bilateral lower and basilar segments.  Heavy calcification of the annulus of the mitral valve.  There is no pneumothorax,focal consolidation, or pleural effusion.    IMPRESSION: No acute traumatic injury.    BOB HUTCHINS MD; Resident Radiologist  This document has been electronically signed.  BIBIANA AARON MD; Attending Radiologist  This document has been electronically signed. Sep  5 2021  8:08AM      ECHO  FINDINGS:  < from: Transthoracic Echocardiogram (09.08.21 @ 00:16) >  Patient name: MONTANA HSU  YOB: 1931   Age: 89 (F)   MR#: [id number]  Study Date: 9/8/2021  [facility] RDCS  Study quality: Technically Fair  Referring Physician: Esdras Antonio MD  Blood Pressure: 156/87 mmHg  Height: 152 cm  Weight: 58 kg  BSA: 1.5 m2  ------------------------------------------------------------------------  PROCEDURE: Transthoracic echocardiogram with 2-D, M-Mode  and complete spectral and color flow Doppler.  INDICATION: Unspecified atrial fibrillation (I48.91)  ------------------------------------------------------------------------  DIMENSIONS:  Dimensions:     Normal Values:  LA:     4.3 cm    2.0 - 4.0 cm  Ao:     3.0 cm    2.0 - 3.8 cm  SEPTUM: 0.8 cm    0.6 - 1.2 cm  PWT:    0.8 cm    0.6 - 1.1 cm  LVIDd:  4.2 cm    3.0 - 5.6 cm  LVIDs:  3.4 cm    1.8 - 4.0 cm  Derived Variables:  LVMI: 66 g/m2  RWT: 0.38  Fractional short: 19 %  Ejection Fraction (Teicholtz): 40 %  ------------------------------------------------------------------------  OBSERVATIONS:  Mitral Valve: Mitral annular calcification, otherwise  normal mitral valve. Mild-moderate mitral regurgitation.  Aortic Root: Normal aortic root.  Aortic Valve: Calcified trileaflet aortic valve with normal  opening. Mild aortic regurgitation.  Left Atrium: Moderately dilated left atrium.  LA volume  index = 44 cc/m2.  Left Ventricle: Endocardium not well visualized; grossly  moderate global left ventricular systolic dysfunction.  Normal left ventricular internal dimensions and wall  thicknesses.  Right Heart: Severe right atrial enlargement. Normal right  ventricular size and function. Normal tricuspid valve.  Moderate tricuspid regurgitation. Normal pulmonic valve.  Mild-moderate pulmonic regurgitation.  Pericardium/PleuraNormal pericardium with no pericardial  effusion.  Hemodynamic: Estimated right ventricular systolic pressure  equals 63 mm Hg, assuming right atrial pressure equals 10  mm Hg, consistent with severe pulmonary hypertension.  ------------------------------------------------------------------------  CONCLUSIONS:  1. Mitral annular calcification, otherwise normal mitral  valve. Mild-moderate mitral regurgitation.  2. Calcified trileaflet aortic valve with normal opening.  Mild aortic regurgitation.  3. Moderately dilated left atrium.  LA volume index = 44  cc/m2.  4. Normal left ventricular internal dimensions and wall  thicknesses.  5. Endocardium not well visualized; grossly moderate global  left ventricular systolicdysfunction.  6. Severe right atrial enlargement.  7. Normal right ventricular size and function.  8. Estimated right ventricular systolic pressure equals 63  mm Hg, assuming right atrial pressure equals 10 mm Hg,  consistent with severe pulmonary hypertension.  ------------------------------------------------------------------------  Confirmed on  9/8/2021 - 17:45:25 by Ermias Camara M.D.,  [facility], [facility]          STRESS  FINDINGS:  < from: Nuclear Stress Test-Pharmacologic (09.29.14 @ 07:27) >  NUCLEAR FINDINGS:  Review of raw data shows: The study is of good technical  quality.  There arelarge, severe defects in apical, mid to distal  anterior and septal walls that are predominantly fixed,  suggestive of infarction with mild zoltan-infarct ischemia.  ------------------------------------------------------------------------  GATED ANALYSIS:  Post-stress gated wall motion analysis was performed (LVEF  = 31 %;LVEDV = 70 ml.), revealing severe overall  hypokinesis. There is apical dyskinesis (aneurysm), mid to  distal anterior and septal akininesis. The remaining  segments contract well, with best perfusion and wall  motion in the lateral wall. RV function appears normal.  ------------------------------------------------------------------------  IMPRESSIONS:Abnormal Study  * Myocardial Perfusion SPECT results are abnormal.  * There are large, severe defects in apical, mid to distal  anterior and septal walls that are predominantly fixed,  suggestive of infarction with mild zoltan-infarct ischemia.  * Post-stress gated wall motion analysis was performed  (LVEF = 31 %;LVEDV = 70 ml.), revealing severe overall  hypokinesis. There is apical dyskinesis (aneurysm), mid to  distal anterior and septal akininesis. The remaining  segments contract well, with best perfusion and wall  motion in the lateral wall. RV function appears normal.  ------------------------------------------------------------------------  Confirmed on  9/29/2014 - 17:27:37 by Ruperto Talamantes M.D.  ------------------------------------------------------------------------

## 2021-09-10 NOTE — PROVIDER CONTACT NOTE (OTHER) - REASON
Pt's blood pressure is 165/98 manual, 
VS out of parameters
Vitals outside of parameters
blood pressure outside of parameters
patient on tele monitoring - A. fib with RVR running 120-130s

## 2021-09-10 NOTE — CHART NOTE - NSCHARTNOTEFT_GEN_A_CORE
Pt follows with Dr Mera   I will sign off and Dr Mera will take over  please call back with any questions

## 2021-09-10 NOTE — PROGRESS NOTE ADULT - ASSESSMENT
Patient is a 89y old  female with past medical history of HTN, paroxysmal atrial fibrillation and dementia who was admitted for an unwitnessed fall.  Daughter Crystal (HCP) states that her mother's dementia has progressed significantly over the last several months and has been refusing all medications and outpatient doctor visits for over one year. In the ED and on telemetry she was found to be in atrial fibrillation w/RVR (130's - 160 bpm).  Now on Nadolol with better rate control. Cardizem added today. ECHO with mild to moderate MR, moderate LV systolic function with LVEF 40% and severe pHTN.   Stress test 2014 with EF 31%.    EP consulted for possible DCCV however, her daughter refusing any procedures for rhythm control.   Plan:  continue to monitor on telemetry.            Continue rate control strategy with Nadolol  and Cardizem.  Can consider long term use of Amiodarone for rate control although the risk of stroke is elevated with conversion to NSR.            Continue Eliquis for anticoagulation [id number].            Will sign off. can reconsult if necessary.     Patient is a 89y old  female with past medical history of HTN, paroxysmal atrial fibrillation and dementia who was admitted for an unwitnessed fall.  Daughter Crystal (HCP) states that her mother's dementia has progressed significantly over the last several months and has been refusing all medications and outpatient doctor visits for over one year. In the ED and on telemetry she was found to be in atrial fibrillation w/RVR (130's - 160 bpm).  Now on Nadolol with better rate control. Cardizem added today. ECHO with mild to moderate MR, moderate LV systolic function with LVEF 40% and severe pHTN.   Stress test 2014 with EF 31%.    EP consulted for possible DCCV however, her daughter refusing any procedures for rhythm control.   Plan:  continue to monitor on telemetry.            Continue rate control strategy with Nadolol  and Cardizem.  Can consider long term use of Amiodarone for rate control although the risk of stroke is elevated with conversion to NSR.            Continue Eliquis for anticoagulation [id number].            Will sign off. Can reconsult if necessary.

## 2021-09-10 NOTE — DISCHARGE NOTE PROVIDER - NSDCMRMEDTOKEN_GEN_ALL_CORE_FT
acetaminophen 325 mg oral tablet: 2 tab(s) orally every 6 hours, As needed, Temp greater or equal to 38.5C (101.3F), Mild Pain (1 - 3)  apixaban 2.5 mg oral tablet: 1 tab(s) orally every 12 hours  dilTIAZem 240 mg/24 hours oral capsule, extended release: 1 cap(s) orally once a day  losartan 25 mg oral tablet: 1 tab(s) orally once a day  mirtazapine 15 mg oral tablet: 1 tab(s) orally once a day (at bedtime)  nadolol 40 mg oral tablet: 1 tab(s) orally every 12 hours  polyethylene glycol 3350 oral powder for reconstitution: 17 gram(s) orally once a day  senna oral tablet: 2 tab(s) orally once a day (at bedtime)

## 2021-09-10 NOTE — PROGRESS NOTE ADULT - ASSESSMENT
_________________________________________________________________________________________  ========>>  M E D I C A L   A T T E N D I N G    F O L L O W  U P  N O T E  <<=========  -----------------------------------------------------------------------------------------------------    - Patient seen and examined by me earlier today.   - In summary,  MONTANA HSU is a 89y year old woman admitted post fall at home   - Patient today overall doing ok, comfortable, eating fairly per RN    ==================>> REVIEW OF SYSTEM <<=================    limited ROS due to dementia / AMS    ==================>> PHYSICAL EXAM <<=================    GEN: awake and alert, NAD , comfortable, pleasant, calm at time of exam   HEENT: NCAT, PERRL, MMM, hearing intact  Neck: supple , no JVD appreciated  CVS: S1S2 , Irregular , tachy   PULM: CTA B/L,  no W/R/R appreciated  ABD.: soft. non tender, non distended,  bowel sounds present  Extrem: intact pulses , no edema   PSYCH : normal mood,  not anxious                             ( Note written / Date of service 09-10-21 )    ==================>> MEDICATIONS <<====================    apixaban 2.5 milliGRAM(s) Oral every 12 hours  diltiazem    Tablet 60 milliGRAM(s) Oral four times a day  losartan 25 milliGRAM(s) Oral daily  nadolol 40 milliGRAM(s) Oral every 12 hours    MEDICATIONS  (PRN):  acetaminophen   Tablet .. 650 milliGRAM(s) Oral every 6 hours PRN Temp greater or equal to 38.5C (101.3F), Mild Pain (1 - 3)  aluminum hydroxide/magnesium hydroxide/simethicone Suspension 30 milliLiter(s) Oral every 4 hours PRN Dyspepsia  melatonin 3 milliGRAM(s) Oral at bedtime PRN Insomnia  ondansetron Injectable 4 milliGRAM(s) IV Push every 8 hours PRN Nausea and/or Vomiting    ___________  Active diet:  Diet, Soft:   Supplement Feeding Modality:  Oral  Ensure Enlive Cans or Servings Per Day:  1       Frequency:  Daily  Ensure Pudding Cans or Servings Per Day:  2       Frequency:  Two Times a day  ___________________    ==================>> VITAL SIGNS <<==================    Vital Signs Last 24 HrsT(C): 36.5 (09-10-21 @ 11:05)  T(F): 97.7 (09-10-21 @ 11:05), Max: 97.7 (09-10-21 @ 11:05)  HR: 110 (09-10-21 @ 11:05) (95 - 110)  BP: 138/95 (09-10-21 @ 16:45)  RR: 17 (09-10-21 @ 11:05) (17 - 18)  SpO2: 99% (09-10-21 @ 11:05) (98% - 100%)      tele: HR low 100s      ==================>> LAB AND IMAGING <<==================                        13.6   12.24 )-----------( 266      ( 09 Sep 2021 06:51 )             41.8        09-10    140  |  102  |  43<H>  ----------------------------<  153<H>  4.1   |  19<L>  |  0.84    Ca    8.9      10 Sep 2021 07:13  Phos  3.5     09-10  Mg     2.10     09-10    TPro  7.2  /  Alb  3.6  /  TBili  1.7<H>  /  DBili  x   /  AST  34<H>  /  ALT  20  /  AlkPhos  87  09-10    WBC count:   12.24 <<== ,  12.32 <<== ,  10.88 <<== ,  13.78 <<==   Hemoglobin:   13.6 <<==,  12.4 <<==,  11.5 <<==,  12.3 <<==  platelets:  266 <==, 249 <==, 189 <==, 206 <==, 185 <==    Creatinine:  0.84  <<==, 0.81  <<==, 0.66  <<==, 0.69  <<==, 0.84  <<==, 0.61  <<==  Sodium:   140  <==, 136  <==, 138  <==, 136  <==, 136  <==, 132  <==       AST:          34 <== , 32 <== , 29 <== , 27 <== , 37 <==      ALT:        20  <== , 23  <== , 20  <== , 21  <== , 20  <==      AP:        87  <=, 84  <=, 81  <=, 73  <=, 82  <=     Bili:        1.7  <=, 2.0  <=, 1.5  <=, 1.0  <=, 1.5  <=    < from: Transthoracic Echocardiogram (09.08.21 @ 00:16) >  CONCLUSIONS:  1. Mitral annular calcification, otherwise normal mitral  valve. Mild-moderate mitral regurgitation.  2. Calcified trileaflet aortic valve with normal opening.  Mild aortic regurgitation.  3. Moderately dilated left atrium.  LA volume index = 44 cc/m2.  4. Normal left ventricular internal dimensions and wall thicknesses.  5. Endocardium not well visualized; grossly moderate global  left ventricular systolic dysfunction.  6. Severe right atrial enlargement.  7. Normal right ventricular size and function.  8. Estimated right ventricular systolic pressure equals 63  mm Hg, assuming right atrial pressure equals 10 mm Hg,  consistent with severe pulmonary hypertension.  ------------------------------------------------------------------------  Confirmed on  9/8/2021 - 17:45:25 by Ermias Camara M.D.,  [facility], NATALIE  ------------------------------------------------------------------------  < end of copied text >  ___________________________________________________________________________________  ===============>>  A S S E S S M E N T   A N D   P L A N <<===============  ------------------------------------------------------------------------------------------    · Assessment	  89 yr old female with HTN and paroxysmal afib presenting with afib with RVR and need for placement     Problem/Plan - 1:  ·  Problem: New Atrial fibrillation with rapid ventricular response.       overall improved   Eliquis as ordered >> to reassess later given history of fall and pt lives a lone at home   ECHO as above showing likely chronic systolic CHF and severe pulmonary HTN  Cardiology appreciated  med optimization per cardio / EP  otherwise Dc planing     Problem/Plan - 2:  ·  Problem: Unwitnessed fall. ( pt lives at home alone)   Xray of left femur, lumbar spine and pelvis with no acute pathology  CT head and cervical spine with no acute findings  PT consult.  fall precautions    OOB to chair daily as able     Problem/Plan - 3:  ·  Problem: Benign essential HTN.   Continue Current medications otherwise and monitor.  cardio following     Problem/Plan - 4:  ·  Problem: Dementia.   AAO x1 at baseline per daughter  Re-orientation.  monitor   safe dispo planing ( pt reportedly lived alone at home)     Problem/Plan - 5:  ·  Problem: Social problem.   daughter reports patient lives at home alone and they would like to look for assistance/placement  social work / CM f/u    Problem/Plan - 6:  ·  Problem: HFrEF (heart failure with reduced ejection fraction).   chronic systolic heart failure   Repeat echo. noted   cardio following    -GI/DVT Prophylaxis per protocol.    ___________________________  H. QUENTIN Antonio.  Pager: [phone]

## 2021-09-10 NOTE — PROVIDER CONTACT NOTE (OTHER) - ASSESSMENT
Patient asymptomatic at this time, VSS and in NAD. Received medication at noon but HR elevating again
Patient remains at baseline in bed. No s/s noted. Slightly agitated.
Pt slightly agitated in bed. Remains at baseline. No other concerns.
Pt's blood pressure is 165/98 manual, . Pt's heart rate goes from 110-150's and flucuates on tele. Pt in no acute distress. No signs or symptoms of chest pain, SOB, or generalized pain. These vitals were obtain after IV Lopressor. Pt's blood pressure did not improve with IV Metoprolol.
Pt agitated when attempting to check vitals, otherwise remains at baseline. Temp: 97.9, spo2: 99%, RR: 18. HR: 128, BP: 156/123

## 2021-09-10 NOTE — DISCHARGE NOTE PROVIDER - NSDCCPCAREPLAN_GEN_ALL_CORE_FT
PRINCIPAL DISCHARGE DIAGNOSIS  Diagnosis: Atrial fibrillation with rapid ventricular response  Assessment and Plan of Treatment: Please continue your medications as directed and follow-up with your primary provider/cardiologist to further manage your care.   Monitor for signs/symptoms of uncontrolled atrial fibrillation, such as, increased heart rate, palpitations, chest pain, dizziness, or shortness of breath - Return to emergency room if these signs/symptoms are present.      SECONDARY DISCHARGE DIAGNOSES  Diagnosis: Dementia  Assessment and Plan of Treatment: Please continue your medications as prescribed and allow help with daily acitivities of living. Maintain a safe environment and make movements in a careful manner to prevent falls. Ensure that you are eating and drinking adequately and maintaining a healthy sleep cycle.   If you are in need of assistance with medication adjustment you can follow-up with your outpatient provider or refer to the [facility] Geriatric Psychiatry clinic by calling [phone].    Diagnosis: Unwitnessed fall  Assessment and Plan of Treatment: You were admitted to the hospital after a fall. Please maintain a safe environment where you reside. Place night lights in the hallways and non-slip rugs/mats on hard surfaces. It is recommended that you move in a careful manner to prevent future events. Perform any exercises recommended by physical therapy and follow-up with your primary care provider.    Diagnosis: Benign essential HTN  Assessment and Plan of Treatment: Continue blood pressure medication regimen as directed. Monitor for any visual changes, headaches or dizziness.  Monitor blood pressure regularly.  Follow up with your primary care provider for further management for high blood pressure.     PRINCIPAL DISCHARGE DIAGNOSIS  Diagnosis: MSSA bacteremia  Assessment and Plan of Treatment: Antibiotics discontinued per goal of care/ living will      SECONDARY DISCHARGE DIAGNOSES  Diagnosis: Benign essential HTN  Assessment and Plan of Treatment: Continue blood pressure medication regimen as directed. Monitor for any visual changes, headaches or dizziness.  Monitor blood pressure regularly.  Follow up with your primary care provider for further management for high blood pressure.    Diagnosis: Dementia  Assessment and Plan of Treatment: Please continue your medications as prescribed and allow help with daily acitivities of living. Maintain a safe environment and make movements in a careful manner to prevent falls. Ensure that you are eating and drinking adequately and maintaining a healthy sleep cycle.   If you are in need of assistance with medication adjustment you can follow-up with your outpatient provider or refer to the [facility] Geriatric Psychiatry clinic by calling [phone].    Diagnosis: Atrial fibrillation with rapid ventricular response  Assessment and Plan of Treatment: Please take your medications as prescribed.  Continue to take your blood thinner Apixaban as prescribed. follow-up with your primary provider/cardiologist to further manage your care.   Monitor for signs/symptoms of uncontrolled atrial fibrillation, such as, increased heart rate, palpitations, chest pain, dizziness, or shortness of breath - Return to emergency room if these signs/symptoms are present.    Diagnosis: Unwitnessed fall  Assessment and Plan of Treatment: You were admitted to the hospital after a fall. Please maintain a safe environment where you reside. Place night lights in the hallways and non-slip rugs/mats on hard surfaces. It is recommended that you move in a careful manner to prevent future events. Perform any exercises recommended by physical therapy and follow-up with your primary care provider.

## 2021-09-10 NOTE — PROGRESS NOTE ADULT - SUBJECTIVE AND OBJECTIVE BOX
Patient sleeping during exam. Appears comfortable in NAD.   No events overnight.         Vital Signs Last 24 Hrs  T(C): 36.5 (10 Sep 2021 11:05), Max: 36.5 (10 Sep 2021 11:05)  T(F): 97.7 (10 Sep 2021 11:05), Max: 97.7 (10 Sep 2021 11:05)  HR: 110 (10 Sep 2021 11:05) (95 - 110)  BP: 155/92 (10 Sep 2021 11:05) (142/90 - 173/99)  BP(mean): --  RR: 17 (10 Sep 2021 11:05) (17 - 18)  SpO2: 99% (10 Sep 2021 11:05) (98% - 100%)        Telemetry:  Atrial fibrillation w/ventricular rates 100-130's.     MEDICATIONS  (STANDING):  apixaban 2.5 milliGRAM(s) Oral every 12 hours  diltiazem    Tablet 30 milliGRAM(s) Oral every 6 hours  losartan 25 milliGRAM(s) Oral daily  nadolol 40 milliGRAM(s) Oral every 12 hours    MEDICATIONS  (PRN):  acetaminophen   Tablet .. 650 milliGRAM(s) Oral every 6 hours PRN Temp greater or equal to 38.5C (101.3F), Mild Pain (1 - 3)  aluminum hydroxide/magnesium hydroxide/simethicone Suspension 30 milliLiter(s) Oral every 4 hours PRN Dyspepsia  melatonin 3 milliGRAM(s) Oral at bedtime PRN Insomnia  ondansetron Injectable 4 milliGRAM(s) IV Push every 8 hours PRN Nausea and/or Vomiting          Physical exam:   Gen- patient sleeping  NAD  Resp- poor effort. No wheezing, rale or rhonchi in anterior lobes  CV- S1 and S2 irregular irregular + systolic murmur. No gallops or rubs  ABD- soft nontender + bowel sounds  EXT- no edema  Neuro- grossly nonfocal                            13.6   12.24 )-----------( 266      ( 09 Sep 2021 06:51 )             41.8       09-10    140  |  102  |  43<H>  ----------------------------<  153<H>  4.1   |  19<L>  |  0.84    Ca    8.9      10 Sep 2021 07:13  Phos  3.5     09-10  Mg     2.10     09-10    TPro  7.2  /  Alb  3.6  /  TBili  1.7<H>  /  DBili  x   /  AST  34<H>  /  ALT  20  /  AlkPhos  87  09-10        ECHOCARDIOGRAM;  < from: Transthoracic Echocardiogram (09.08.21 @ 00:16) >  Patient name: MONTANA HSU  YOB: 1931   Age: 89 (F)   MR#: [id number]  Study Date: 9/8/2021  [facility]  Study quality: Technically Fair  Referring Physician: Esdras Antonio MD  Blood Pressure: 156/87 mmHg  Height: 152 cm  Weight: 58 kg  BSA: 1.5 m2  ------------------------------------------------------------------------  PROCEDURE: Transthoracic echocardiogram with 2-D, M-Mode  and complete spectral and color flow Doppler.  INDICATION: Unspecified atrial fibrillation (I48.91)  ------------------------------------------------------------------------  DIMENSIONS:  Dimensions:     Normal Values:  LA:     4.3 cm    2.0 - 4.0 cm  Ao:     3.0 cm    2.0 - 3.8 cm  SEPTUM: 0.8 cm    0.6 - 1.2 cm  PWT:    0.8 cm    0.6 - 1.1 cm  LVIDd:  4.2 cm    3.0 - 5.6 cm  LVIDs:  3.4 cm    1.8 - 4.0 cm  Derived Variables:  LVMI: 66 g/m2  RWT: 0.38  Fractional short: 19 %  Ejection Fraction (Sebastiánoltz): 40 %  ------------------------------------------------------------------------  OBSERVATIONS:  Mitral Valve: Mitral annular calcification, otherwise  normal mitral valve. Mild-moderate mitral regurgitation.  Aortic Root: Normal aortic root.  Aortic Valve: Calcified trileaflet aortic valve with normal  opening. Mild aortic regurgitation.  Left Atrium: Moderately dilated left atrium.  LA volume  index = 44 cc/m2.  Left Ventricle: Endocardium not well visualized; grossly  moderate global left ventricular systolic dysfunction.  Normal left ventricular internal dimensions and wall  thicknesses.  Right Heart: Severe right atrial enlargement. Normal right  ventricular size and function. Normal tricuspid valve.  Moderate tricuspid regurgitation. Normal pulmonic valve.  Mild-moderate pulmonic regurgitation.  Pericardium/PleuraNormal pericardium with no pericardial  < from: Transthoracic Echocardiogram (09.08.21 @ 00:16) >  effusion.  Hemodynamic: Estimated right ventricular systolic pressure  equals 63 mm Hg, assuming right atrial pressure equals 10  mm Hg, consistent with severe pulmonary hypertension.  ------------------------------------------------------------------------  CONCLUSIONS:  1. Mitral annular calcification, otherwise normal mitral  valve. Mild-moderate mitral regurgitation.  2. Calcified trileaflet aortic valve with normal opening.  Mild aortic regurgitation.  3. Moderately dilated left atrium.  LA volume index = 44  cc/m2.  4. Normal left ventricular internal dimensions and wall  thicknesses.  5. Endocardium not well visualized; grossly moderate global  left ventricular systolicdysfunction.  6. Severe right atrial enlargement.  7. Normal right ventricular size and function.  8. Estimated right ventricular systolic pressure equals 63  mm Hg, assuming right atrial pressure equals 10 mm Hg,  consistent with severe pulmonary hypertension.  ------------------------------------------------------------------------  Confirmed on  9/8/2021 - 17:45:25 by Ermias Camara M.D.,  [facility], [facility]  ------------------------------------------------------------------------

## 2021-09-10 NOTE — PROVIDER CONTACT NOTE (OTHER) - ACTION/TREATMENT ORDERED:
awaiting response
Continue to monitor, administer AM meds as ordered.
Give morning medication early.
ACP  Rosie Rascon made aware. No further interventions ordered.

## 2021-09-10 NOTE — PROVIDER CONTACT NOTE (OTHER) - BACKGROUND
89 y/o F admitted for afib. Hx HTN, dementia a+o 1 at baseline.
89y/o F admitted for afib (rapid afib in ED (180s), s/p unwitnessed fall at home. Hx dementia and HTN. A+O x1 at baseline/
Pt admitted 9/4 for medication noncompliance and fall. Pt has history of a. fib.
89 y/o F admitted for afib. History of HTN.
Pt is a 89 year old with history of hypertension, A-fib, dementia, noncompliant with medications and had an unwitnessed fall at home

## 2021-09-10 NOTE — PROGRESS NOTE ADULT - SUBJECTIVE AND OBJECTIVE BOX
Cardiovascular Disease Progress Note    Overnight events: No acute events overnight.  no cp/sob/palps/dizziness  Otherwise review of systems negative    Objective Findings:  T(C): 36.2 (09-10-21 @ 00:33), Max: 36.4 (09-09-21 @ 20:25)  HR: 102 (09-10-21 @ 05:48) (95 - 110)  BP: 142/90 (09-10-21 @ 05:48) (142/90 - 173/99)  RR: 17 (09-10-21 @ 00:33) (17 - 18)  SpO2: 98% (09-10-21 @ 00:33) (98% - 100%)  Wt(kg): --  Daily     Daily       Physical Exam:  Gen: NAD  HEENT: EOMI  CV: RRR, normal S1 + S2, no m/r/g  Lungs: CTAB  Abd: soft, non-tender  Ext: No edema    Telemetry: af 100s    Laboratory Data:                        13.6   12.24 )-----------( 266      ( 09 Sep 2021 06:51 )             41.8     09-09    136  |  97<L>  |  43<H>  ----------------------------<  184<H>  4.0   |  18<L>  |  0.81    Ca    8.9      09 Sep 2021 06:51  Phos  4.1     09-09  Mg     2.10     09-09    TPro  7.2  /  Alb  3.7  /  TBili  2.0<H>  /  DBili  x   /  AST  32  /  ALT  23  /  AlkPhos  84  09-09              Inpatient Medications:  MEDICATIONS  (STANDING):  apixaban 2.5 milliGRAM(s) Oral every 12 hours  losartan 25 milliGRAM(s) Oral daily  nadolol 40 milliGRAM(s) Oral every 12 hours      1. Mitral annular calcification, otherwise normal mitral  valve. Mild-moderate mitral regurgitation.  2. Calcified trileaflet aortic valve with normal opening.  Mild aortic regurgitation.  3. Moderately dilated left atrium.  LA volume index = 44  cc/m2.  4. Normal left ventricular internal dimensions and wall  thicknesses.  5. Endocardium not well visualized; grossly moderate global  left ventricular systolicdysfunction.  6. Severe right atrial enlargement.  7. Normal right ventricular size and function.  8. Estimated right ventricular systolic pressure equals 63  mm Hg, assuming right atrial pressure equals 10 mm Hg,  consistent with severe pulmonary hypertension.  ------------------------------------------------------------------------  Confirmed on  9/8/2021 - 17:45:25 by Ermias Camara M.D.,  [facility], [facility]  ------------------------------------------------------------------------    < end of copied text >            Assessment:  persistent atrial fibrillation  chronic systolic HF --> mod global lv dysfunction  severe pHTN    Recs:  cv stable  no e/o acs  elevated JVP on exam, but with poor po intake and elevated BUN. monitor off diuretics for now  start dilt 30mg q6h for af with rvr. cw nadolol as dosed. cw eliquis for stroke prevention  gdmt for lv dysfunction --> beta blockers and ARB. defer ischemic eval for now. 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 defer rhc and ischemic eval for now. diuretics prn. start dilt as above  monitor on tele  eps f/u        Over 35 minutes spent on total encounter; more than 50% of the visit was spent counseling and/or coordinating care by the attending physician.      Felipe Hills MD   Cardiovascular Disease  [phone]

## 2021-09-10 NOTE — PROGRESS NOTE ADULT - ATTENDING COMMENTS
89y old  female with past medical history of HTN, paroxysmal atrial fibrillation and dementia who was admitted for an unwitnessed fall.  Daughter Crystal (HCP) states that her mother's dementia has progressed significantly over the last several months and has been refusing all medications and outpatient doctor visits for over one year. In the ED and on telemetry she was found to be in atrial fibrillation w/RVR (130's - 160 bpm).  Now on Nadolol with better rate control. Cardizem added today. ECHO with mild to moderate MR, moderate LV systolic function with LVEF 40% and severe pHTN.  EP consulted for possible DCCV however, her daughter declining any procedures for rhythm control. Can consider long term use of Amiodarone for rate control although the risk of stroke is elevated with conversion to NSR. Continue Eliquis for anticoagulation [id number].

## 2021-09-10 NOTE — PROVIDER CONTACT NOTE (OTHER) - RECOMMENDATIONS
ACP  Rosie Rascon made aware
Contact ACP as per orders.
Will await any new orders and monitor patient.
Contact ACP
contact ACP.

## 2021-09-10 NOTE — PROVIDER CONTACT NOTE (OTHER) - SITUATION
Pt HR went up to 168 on monitor, now sustaining 130s/140s. BP: 156/123.
Pt's blood pressure is 165/98 manual, .
patient on tele monitoring - A. fib with RVR running 120-130s
Provider requested set of vitals, BP outside of parameters 144/113. Pt asymptomatic in bed, HR: 97, afib on monitor.
Pt HR sustaining in 120s, metoprolol IVP ordered, BP: 185/111 pre IVP, BP: 155/105 post IV push.

## 2021-09-10 NOTE — DISCHARGE NOTE PROVIDER - HOSPITAL COURSE
89F h/o HTN, HLD, PAF, and Dementia (AAO x1 at baseline) p/w non-compliance w/ home medications x 6 months and unwitnessed mechanical fall     AFib w/ RVR  - Presented with AFIB with RVR with HR in the 130s  - Restarted on Metoprolol - HR NOT well controlled, changed to Cardizem - HR still NOT well controlled, pt now on Nadolol per cardio  - ECHO EF 40% w/ moderate MR, moderately dilated LA, grossly moderate global LV systolic dysfunction, severe RA enlargement, and severe pulmonary hypertension  - As per EP continue Nadolol however, can use Amiodarone if necessary   - No SHAHID/DCCV as per daughter's wishes   - On Apixiban 2.5mg PO BID    Unwitnessed fall  - XR of left femur, lumbar spine and pelvis with no acute pathology  - CT head and cervical spine with no acute findings  - Fall precautions; PT Eval (Not participating)    Dementia.   - AAO x1 at baseline per daughter  - Reportedly at home not taking medication for the past 6 months as didn't think she needs treatment; Lives alone  - CM/SW eval for safe discharge planning; For LTC placement     HF/HTN  - Cardiology consulted  - BP well controlled w/ Cozaar and BB    Dispo - 89F h/o HTN, HLD, PAF, and Dementia (AAO x1 at baseline) p/w non-compliance w/ home medications x 6 months and unwitnessed mechanical fall       Leukocytosis: bacteremia, likely developing pneumonia    recent COVID exposure but testing negative     ID on board and appreciated / following     Abx stopped now as per pt's living will as documented !    palliative consult >> hospice referral       AFib w/ RVR  - Presented with AFIB with RVR with HR in the 130s  - Restarted on Metoprolol - HR NOT well controlled, changed to Cardizem - HR still NOT well controlled, pt now on Nadolol and Cardizem per cardio  - ECHO EF 40% w/ moderate MR, moderately dilated LA, grossly moderate global LV systolic dysfunction, severe RA enlargement, and severe pulmonary hypertension  - As per EP continue Nadolol however, can use Amiodarone if necessary   - No SHAHID/DCCV as per daughter's wishes   - On Apixiban 2.5mg PO BID    Unwitnessed fall  - XR of left femur, lumbar spine and pelvis with no acute pathology  - CT head and cervical spine with no acute findings  - Fall precautions; PT Eval (Not participating)    Dementia.   - AAO x1 at baseline per daughter  - Reportedly at home not taking medication for the past 6 months as didn't think she needs treatment; Lives alone  - CM/SW eval for safe discharge planning; For LTC placement     HF/HTN  - Cardiology consulted  - BP well controlled w/ Cozaar and BB    Dispo -

## 2021-09-11 NOTE — DIETITIAN INITIAL EVALUATION ADULT. - ORAL INTAKE PTA/DIET HISTORY
Pt unable to participate in interview secondary to dementia. Comprehensive chart review completed. Pt visibly resting in bed at time of visit, poor dentition noted. NKFA. Per chart, pt lives at home alone, family checks in daily.

## 2021-09-11 NOTE — DIETITIAN INITIAL EVALUATION ADULT. - REASON FOR ADMISSION
Per chart, pt is 89 year old Polish speaking female PMHx HTN, HLD, paroxysmal Afib, dementia presenting with medication non-compliance, s/p unwitnessed mechanical fall.

## 2021-09-11 NOTE — DIETITIAN INITIAL EVALUATION ADULT. - OTHER INFO
Dosing weight (9/5) 128 lbs. No recent weight history available via chart.  No noted GI distress, last BM 9/8 per flowsheets. No bowel regimen ordered at this time.

## 2021-09-11 NOTE — PROGRESS NOTE ADULT - SUBJECTIVE AND OBJECTIVE BOX
Date of Service  : 09-11-21 @ 11:14    INTERVAL HPI/OVERNIGHT EVENTS: No new concerns per staff   Vital Signs Last 24 Hrs  T(C): 36.4 (11 Sep 2021 05:45), Max: 36.4 (10 Sep 2021 22:00)  T(F): 97.5 (11 Sep 2021 05:45), Max: 97.6 (10 Sep 2021 22:00)  HR: 99 (11 Sep 2021 05:45) (85 - 99)  BP: 132/94 (11 Sep 2021 05:45) (132/94 - 153/88)  BP(mean): --  RR: 17 (11 Sep 2021 05:45) (17 - 17)  SpO2: 97% (11 Sep 2021 05:45) (97% - 98%)  I&O's Summary    MEDICATIONS  (STANDING):  apixaban 2.5 milliGRAM(s) Oral every 12 hours  diltiazem    milliGRAM(s) Oral daily  losartan 25 milliGRAM(s) Oral daily  nadolol 40 milliGRAM(s) Oral every 12 hours    MEDICATIONS  (PRN):  acetaminophen   Tablet .. 650 milliGRAM(s) Oral every 6 hours PRN Temp greater or equal to 38.5C (101.3F), Mild Pain (1 - 3)  aluminum hydroxide/magnesium hydroxide/simethicone Suspension 30 milliLiter(s) Oral every 4 hours PRN Dyspepsia  melatonin 3 milliGRAM(s) Oral at bedtime PRN Insomnia  ondansetron Injectable 4 milliGRAM(s) IV Push every 8 hours PRN Nausea and/or Vomiting    LABS:                        15.1   28.74 )-----------( 272      ( 11 Sep 2021 07:02 )             47.1     09-11    144  |  105  |  28<H>  ----------------------------<  158<H>  3.5   |  24  |  0.65    Ca    8.7      11 Sep 2021 07:02  Phos  2.8     09-11  Mg     2.00     09-11    TPro  7.2  /  Alb  3.6  /  TBili  1.7<H>  /  DBili  x   /  AST  34<H>  /  ALT  20  /  AlkPhos  87  09-10        CAPILLARY BLOOD GLUCOSE      POCT Blood Glucose.: 171 mg/dL (10 Sep 2021 21:35)              Consultant(s) Notes Reviewed:  [x ] YES  [ ] NO    PHYSICAL EXAM:  GENERAL: NAD, well-groomed, well-developed,not in any distress ,  HEAD:  Atraumatic, Normocephalic  EYES: EOMI, PERRLA, conjunctiva and sclera clear  ENMT: No tonsillar erythema, exudates, or enlargement; Moist mucous membranes, Good dentition, No lesions  NECK: Supple, No JVD, Normal thyroid  NERVOUS SYSTEM:  Alert & Oriented X0, No focal deficit   CHEST/LUNG: Good air entry bilateral with no  rales, rhonchi, wheezing, or rubs  HEART: Regular rate and rhythm; No murmurs, rubs, or gallops  ABDOMEN: Soft, Nontender, Nondistended; Bowel sounds present  EXTREMITIES:  2+ Peripheral Pulses, No clubbing, cyanosis, or edema  SKIN: No rashes or lesions    Care Discussed with Consultants/Other Providers [ x] YES  [ ] NO

## 2021-09-11 NOTE — DIETITIAN INITIAL EVALUATION ADULT. - ORAL NUTRITION SUPPLEMENTS
2) Recommend Ensure Plus 1 PO 2x daily (350 kcal, 13 gm protein per 8 oz serving), Ensure Pudding 1 PO 2x daily (provides 170 kcal, 4 gm protein per 4oz serving).

## 2021-09-11 NOTE — PROGRESS NOTE ADULT - ASSESSMENT
89 yr old female with a pmh of HTN, HLD, paroxysmal Afib, and dementia AAO x1 at baseline per daughter who has not been taking any of her medication for the past 6 months as per daughter the patient does not feel that she needs them. Presents following an unwitnessed mechanical fall approximately 10:30 am (found 11:30 am by son).   Per patient she did not have any symptoms prior to having her fall and she did not fully recall having the fall. She denies any complaints when asked further. Repeats that she is 89 yrs old already.    Denies  headache, dizziness, chest pain, palpitations, SOB, abdominal pain, joint pain, diarrhea/constipation, urinary symptoms.     Vitals in the ED found to be in afib with 's -> 72, Tmax 97.9, /75, RR 16 satting 100% RA (04 Sep 2021 22:41)    HOSP COURSE:    Pt managed for Afib with RVR, s/p medical managment.  Xray of femur, lumbar spine and pelvis no acute pathology.  CT head and cspine w/o acute findings.    ID consult called for leukocytosis.  WBC increased to 28 on 9/11.  Pt afebrile, not on recent steroids.  Pt with fecal incontinence reported.        Leukocytosis:    - Check UA, urine culture.  Check repeat chest xray.  Pt currently w/o localizing signs/symptoms on clinical exam, vss, afebrile, normotensive.  No bedside cough, no reported diarrhea, no rash or thrombophlebitis at IV sites, no joint effusions.   - Repeat cbc with differential in AM  - Monitor off abx at this time until further w/u available.  - If pt spikes fever, send blood cx x 2.    - Will reassess need for further laboratory w/u or diagnositic imaging pending above studies.     Will follow,    Mona Yo  [phone]

## 2021-09-11 NOTE — PROGRESS NOTE ADULT - ASSESSMENT
89 yr old female with HTN and paroxysmal afib presenting with afib with RVR and need for placement      Problem/Plan - 1:  ·  Problem: New Atrial fibrillation with rapid ventricular response.       overall improved   Eliquis as ordered >> to reassess later given history of fall and pt lives a lone at home   ECHO as above showing likely chronic systolic CHF and severe pulmonary HTN  Cardiology appreciated  med optimization per cardio / EP  otherwise Dc planing      Problem/Plan - 2:  ·  Problem: Unwitnessed fall. ( pt lives at home alone)   Xray of left femur, lumbar spine and pelvis with no acute pathology  CT head and cervical spine with no acute findings  PT consult.  fall precautions    OOB to chair daily as able      Problem/Plan - 3:  ·  Problem: Benign essential HTN.   Continue Current medications otherwise and monitor.  cardio following      Problem/Plan - 4:  ·  Problem: Dementia.   AAO x1 at baseline per daughter  Re-orientation.  monitor   safe dispo planing ( pt reportedly lived alone at home)      Problem/Plan - 5:  ·  Problem: Leucocytosis .   No fever etc . ID consulted. ON AC.      Problem/Plan - 6:  ·  Problem: HFrEF (heart failure with reduced ejection fraction).   chronic systolic heart failure   Repeat echo. noted   cardio following    -GI/DVT Prophylaxis per protocol.

## 2021-09-11 NOTE — PROGRESS NOTE ADULT - SUBJECTIVE AND OBJECTIVE BOX
Patient is a 89y old  Female who presents with a chief complaint of fall (11 Sep 2021 11:14)      HPI:    89 yr old female with a pmh of HTN, HLD, paroxysmal Afib, and dementia AAO x1 at baseline per daughter who has not been taking any of her medication for the past 6 months as per daughter the patient does not feel that she needs them. Presents following an unwitnessed mechanical fall approximately 10:30 am (found 11:30 am by son).   Per patient she did not have any symptoms prior to having her fall and she did not fully recall having the fall. She denies any complaints when asked further. Repeats that she is 89 yrs old already.    Denies  headache, dizziness, chest pain, palpitations, SOB, abdominal pain, joint pain, diarrhea/constipation, urinary symptoms.     Vitals in the ED found to be in afib with 's -> 72, Tmax 97.9, /75, RR 16 satting 100% RA (04 Sep 2021 22:41)          REVIEW OF SYSTEMS:    CONSTITUTIONAL: No fever, weight loss, or fatigue  EYES: No eye pain, visual disturbances, or discharge  ENMT:  No sore throat  NECK: No pain or stiffness  RESPIRATORY: No cough, wheezing, chills or hemoptysis; No shortness of breath  CARDIOVASCULAR: No chest pain, palpitations, dizziness, or leg swelling  GASTROINTESTINAL: No abdominal or epigastric pain. No nausea, vomiting, or hematemesis; No diarrhea or constipation. No melena or hematochezia.  GENITOURINARY: No dysuria, frequency, hematuria, or incontinence  NEUROLOGICAL: No headaches, memory loss, loss of strength, numbness, or tremors  SKIN: No itching, burning, rashes, or lesions   LYMPH NODES: No enlarged glands  MUSCULOSKELETAL: No joint pain or swelling; No muscle, back, or extremity pain      PAST MEDICAL & SURGICAL HISTORY:  PAF (paroxysmal atrial fibrillation)    HTN (hypertension)    NO SIGNIFICANT PAST SURGICAL HISTORY        Allergies    No Known Allergies    Intolerances        FAMILY HISTORY:  No pertinent family history in first degree relatives        SOCIAL HISTORY:    No h/o smoking, ivdu, etoh    MEDICATIONS  (STANDING):  apixaban 2.5 milliGRAM(s) Oral every 12 hours  diltiazem    milliGRAM(s) Oral daily  losartan 25 milliGRAM(s) Oral daily  nadolol 40 milliGRAM(s) Oral every 12 hours    MEDICATIONS  (PRN):  acetaminophen   Tablet .. 650 milliGRAM(s) Oral every 6 hours PRN Temp greater or equal to 38.5C (101.3F), Mild Pain (1 - 3)  aluminum hydroxide/magnesium hydroxide/simethicone Suspension 30 milliLiter(s) Oral every 4 hours PRN Dyspepsia  melatonin 3 milliGRAM(s) Oral at bedtime PRN Insomnia  ondansetron Injectable 4 milliGRAM(s) IV Push every 8 hours PRN Nausea and/or Vomiting      Vital Signs Last 24 Hrs  T(C): 36.4 (11 Sep 2021 05:45), Max: 36.4 (10 Sep 2021 22:00)  T(F): 97.5 (11 Sep 2021 05:45), Max: 97.6 (10 Sep 2021 22:00)  HR: 99 (11 Sep 2021 05:45) (85 - 99)  BP: 132/94 (11 Sep 2021 05:45) (132/94 - 153/88)  BP(mean): --  RR: 17 (11 Sep 2021 05:45) (17 - 17)  SpO2: 97% (11 Sep 2021 05:45) (97% - 98%)    PHYSICAL EXAM:    GENERAL: NAD, well-groomed  HEAD:  Atraumatic, Normocephalic  EYES: EOMI, PERRLA, conjunctiva and sclera clear  ENMT: No tonsillar erythema, exudates, or enlargement; Moist mucous membranes  NECK: Supple, No JVD  CHEST/LUNG: Clear to percussion bilaterally; No rales, rhonchi, wheezing, or rubs  HEART: Regular rate and rhythm; No murmurs, rubs, or gallops  ABDOMEN: Soft, Nontender, Nondistended; Bowel sounds present  EXTREMITIES:  2+ Peripheral Pulses, No clubbing, cyanosis, or edema  LYMPH: No lymphadenopathy noted  SKIN: No rashes or lesions    LABS:  CBC Full  -  ( 11 Sep 2021 07:02 )  WBC Count : 28.74 K/uL  RBC Count : 5.17 M/uL  Hemoglobin : 15.1 g/dL  Hematocrit : 47.1 %  Platelet Count - Automated : 272 K/uL  Mean Cell Volume : 91.1 fL  Mean Cell Hemoglobin : 29.2 pg  Mean Cell Hemoglobin Concentration : 32.1 gm/dL  Auto Neutrophil # : x  Auto Lymphocyte # : x  Auto Monocyte # : x  Auto Eosinophil # : x  Auto Basophil # : x  Auto Neutrophil % : x  Auto Lymphocyte % : x  Auto Monocyte % : x  Auto Eosinophil % : x  Auto Basophil % : x      09-11    144  |  105  |  28<H>  ----------------------------<  158<H>  3.5   |  24  |  0.65    Ca    8.7      11 Sep 2021 07:02  Phos  2.8     09-11  Mg     2.00     09-11    TPro  7.2  /  Alb  3.6  /  TBili  1.7<H>  /  DBili  x   /  AST  34<H>  /  ALT  20  /  AlkPhos  87  09-10      LIVER FUNCTIONS - ( 10 Sep 2021 07:13 )  Alb: 3.6 g/dL / Pro: 7.2 g/dL / ALK PHOS: 87 U/L / ALT: 20 U/L / AST: 34 U/L / GGT: x               MICROBIOLOGY:      COVID-19 PCR . (09.08.21 @ 12:12)   COVID-19 PCR: NotDetec: You can help in the fight against COVID-19. [employer] may contact   you to see if you are interested in voluntarily participating in one of   our clinical trials.   Testing is performed using polymerase chain reaction (PCR) or   transcription mediated amplification (TMA). This COVID-19 (SARS-CoV-2)   nucleic acid amplification test was validated by [employer] and is   in use under the FDA Emergency Use Authorization (EUA) for clinical labs   CLIA-certified to perform high complexity testing. Test results should be   correlated with clinical presentation, patient history, and epidemiology.       COVID-19 Wilberto Domain Antibody (09.06.21 @ 10:33)   COVID-19 Wilberto Domain Antibody Result: 0.40: Roche ECLIA Total AB (NENA)   NOTE: This result index represents a total antibody measurement, which   includes IgG, IgA and IgM.   Measures Receptor Binding Domain of the Wilberto Protein   Negative <= 0.79 U/mL   Positive >= 0.80 U/mL U/mL   COVID-19 Wilberto Domain AB Interp: Negative: This test has been authorized for emergency use by the FDA. [employer] has validated this test to be accurate.   Results from antibody testing should not be used to inform infection   status.           RADIOLOGY:    < from: Xray Femur 1 View, Left (09.04.21 @ 19:11) >  FINDINGS/  IMPRESSION:  There is no acute fracture or dislocation. The obturator and pelvic rings are intact. There is generalized osteopenia. If continued concern for fracture, CT would be more sensitive.    < end of copied text >      < from: Xray Pelvis AP only (09.04.21 @ 19:11) >  EXAM:  XR PELVIS AP ONLY 1-2 VIEWS      EXAM:  XR FEMUR 1 VIEW LT        PROCEDURE DATE:  Sep  4 2021         INTERPRETATION:  CLINICAL INFORMATION: Fall, left lower extremity pain    TECHNIQUE: 2 views of the left femur, AP view of the pelvis    COMPARISON: None available    < end of copied text >      < from: Xray Lumbar Spine AP + Lateral (09.04.21 @ 19:11) >  FINDINGS:  Advanced degenerative changes and demineralization significantly limited interpretation of the exam. There is prominent levoscoliosis.    There is loss of the anterior vertebral height at the levels of T12 and L2, however loss vertebral height at T12 is demonstrated on prior chest x-ray.    Mild retrolisthesis of L2 on L3. There is mild degenerative grade 1 anterolisthesis of L4 on L5.    There is atherosclerotic disease.    IMPRESSION:  Loss of anterior vertebral height of the L2 vertebral body is concerning for age indeterminant compression fracture.    Mild retrolisthesis of L2 on L3.    < end of copied text >      < from: Xray Chest 1 View- PORTABLE-Urgent (Xray Chest 1 View- PORTABLE-Urgent .) (09.04.21 @ 19:11) >  FINDINGS:  Trachea is midline.    The heart is borderline enlarged.  There are focal areas of linear atelectasis in the bilateral lower and basilar segments.  Heavy calcification of the annulus of the mitral valve.  There is no pneumothorax,focal consolidation, or pleural effusion.    IMPRESSION: No acute traumatic injury.    < end of copied text >          < from: CT Head No Cont (09.04.21 @ 19:10) >    EXAM:  CT CERVICAL SPINE      EXAM:  CT BRAIN        PROCEDURE DATE:  Sep  4 2021         INTERPRETATION:  CT HEAD, CT CERVICAL SPINE    INDICATIONS: s/p fall, Polish-speaking PMHx dementia (A.O. x1 at baseline), A-fib, HTN who presents after unwitnessed fall. History obtained from daughter at bedside. The patient was found on the floor by the patient's son, she was conscious when found however it was unknown if she had prodromal symptoms nor how long she was down for. The patient herself does not remember the event and is unable to provide further details. Daughter reports she has never had a fall prior. She lives alone and has not taken any of her prescribed medications for over 6 months. The pt reports lower back pain and L pelvic pain, but otherwise has no complaints at this time.    CT BRAIN:    TECHNIQUE:  Multiple contiguous axial images were obtained from the skull base to the vertex without the use of intravenous contrast.    COMPARISON EXAMINATION: Brain MRI 10/8/2014 and headCT 10/4/2014.    FINDINGS:  Ventricles and sulci: Parenchymal volume loss is present which is commensurate with patient age.  Intra-axial: There are hemispheric white matter areas of low attenuation which are nonspecific but likely related to sequelae of microvascular disease. Encephalomalacia/gliosis lower right cerebellar hemisphere, unchanged.  No intracranial mass, acute hemorrhage, or significant midline shift is present.  Extra-axial: There is no extra-axial collection.  Visualized sinuses:No air-fluid levels are identified. Clear.  Visualized mastoids:  Clear.  Calvarium: Unremarkable.  Miscellaneous:  None.    Impression: See below    ======================================================================================      CT CERVICAL SPINE:    TECHNIQUE:  Axial images were obtained through the cervical spine using multislice helical technique.  Reformatted coronal and sagittal images were performed.    COMPARISON EXAMINATION:  None available at this time.    FINDINGS:  On the sagittal reformations, there is no prevertebral soft tissue swelling. There is no splaying of the spinous processes.  On the coronal reformations, occipital condyles are normal. Lateral masses of C1 align normally with C2.  On the axial images, no lucent fracture line is identified.    Multilevel degenerative osteoarthritis is present. Findings include marginal osteophytes, uncovertebral spurring, and facet joint space compartment narrowing with subchondral sclerosis and hypertrophic osteophytes at multiple levels. There is multilevel degenerative disc disease. Findings include loss of normal disc space height and endplate sclerosis. There is suggestion of multilevel fusion.    Miscellaneous:  None.    IMPRESSIONS:    Head CT: No CT evidence of acute intracranial hemorrhage.  Encephalomalacia/gliosis lower right cerebellar hemisphere, unchanged.    C-spine CT:  No acute fracture.    < end of copied text >       Patient is a 89y old  Female who presents with a chief complaint of fall (11 Sep 2021 11:14)    ID CONSULTATION for leukocytosis    HPI:    89 yr old female with a pmh of HTN, HLD, paroxysmal Afib, and dementia AAO x1 at baseline per daughter who has not been taking any of her medication for the past 6 months as per daughter the patient does not feel that she needs them. Presents following an unwitnessed mechanical fall approximately 10:30 am (found 11:30 am by son).   Per patient she did not have any symptoms prior to having her fall and she did not fully recall having the fall. She denies any complaints when asked further. Repeats that she is 89 yrs old already.    Denies  headache, dizziness, chest pain, palpitations, SOB, abdominal pain, joint pain, diarrhea/constipation, urinary symptoms.     Vitals in the ED found to be in afib with 's -> 72, Tmax 97.9, /75, RR 16 satting 100% RA (04 Sep 2021 22:41)    ID consult called for leukocytosis.           REVIEW OF SYSTEMS:    CONSTITUTIONAL: No fever, weight loss, or fatigue  EYES: No eye pain, visual disturbances, or discharge  ENMT:  No sore throat  NECK: No pain or stiffness  RESPIRATORY: No cough, wheezing, chills or hemoptysis; No shortness of breath  CARDIOVASCULAR: No chest pain, palpitations, dizziness, or leg swelling  GASTROINTESTINAL: No abdominal or epigastric pain. No nausea, vomiting, or hematemesis; No diarrhea or constipation. No melena or hematochezia.  GENITOURINARY: No dysuria, frequency, hematuria, or incontinence  NEUROLOGICAL: No headaches, memory loss, loss of strength, numbness, or tremors  SKIN: No itching, burning, rashes, or lesions   LYMPH NODES: No enlarged glands  MUSCULOSKELETAL: No joint pain or swelling; No muscle, back, or extremity pain      PAST MEDICAL & SURGICAL HISTORY:  PAF (paroxysmal atrial fibrillation)    HTN (hypertension)    NO SIGNIFICANT PAST SURGICAL HISTORY        Allergies    No Known Allergies    Intolerances        FAMILY HISTORY:  No pertinent family history in first degree relatives        SOCIAL HISTORY:    No h/o smoking, ivdu, etoh    MEDICATIONS  (STANDING):  apixaban 2.5 milliGRAM(s) Oral every 12 hours  diltiazem    milliGRAM(s) Oral daily  losartan 25 milliGRAM(s) Oral daily  nadolol 40 milliGRAM(s) Oral every 12 hours    MEDICATIONS  (PRN):  acetaminophen   Tablet .. 650 milliGRAM(s) Oral every 6 hours PRN Temp greater or equal to 38.5C (101.3F), Mild Pain (1 - 3)  aluminum hydroxide/magnesium hydroxide/simethicone Suspension 30 milliLiter(s) Oral every 4 hours PRN Dyspepsia  melatonin 3 milliGRAM(s) Oral at bedtime PRN Insomnia  ondansetron Injectable 4 milliGRAM(s) IV Push every 8 hours PRN Nausea and/or Vomiting      Vital Signs Last 24 Hrs  T(C): 36.4 (11 Sep 2021 05:45), Max: 36.4 (10 Sep 2021 22:00)  T(F): 97.5 (11 Sep 2021 05:45), Max: 97.6 (10 Sep 2021 22:00)  HR: 99 (11 Sep 2021 05:45) (85 - 99)  BP: 132/94 (11 Sep 2021 05:45) (132/94 - 153/88)  BP(mean): --  RR: 17 (11 Sep 2021 05:45) (17 - 17)  SpO2: 97% (11 Sep 2021 05:45) (97% - 98%)    PHYSICAL EXAM:    GENERAL: NAD, well-groomed  HEAD:  Atraumatic, Normocephalic  EYES: EOMI, PERRLA, conjunctiva and sclera clear  ENMT: No tonsillar erythema, exudates, or enlargement; Moist mucous membranes  NECK: Supple, No JVD  CHEST/LUNG: Clear to percussion bilaterally; No rales, rhonchi, wheezing, or rubs  HEART: Regular rate and rhythm; No murmurs, rubs, or gallops  ABDOMEN: Soft, Nontender, Nondistended; Bowel sounds present  EXTREMITIES:  2+ Peripheral Pulses, No clubbing, cyanosis, or edema  LYMPH: No lymphadenopathy noted  SKIN: No rashes or lesions    LABS:  CBC Full  -  ( 11 Sep 2021 07:02 )  WBC Count : 28.74 K/uL  RBC Count : 5.17 M/uL  Hemoglobin : 15.1 g/dL  Hematocrit : 47.1 %  Platelet Count - Automated : 272 K/uL  Mean Cell Volume : 91.1 fL  Mean Cell Hemoglobin : 29.2 pg  Mean Cell Hemoglobin Concentration : 32.1 gm/dL  Auto Neutrophil # : x  Auto Lymphocyte # : x  Auto Monocyte # : x  Auto Eosinophil # : x  Auto Basophil # : x  Auto Neutrophil % : x  Auto Lymphocyte % : x  Auto Monocyte % : x  Auto Eosinophil % : x  Auto Basophil % : x      09-11    144  |  105  |  28<H>  ----------------------------<  158<H>  3.5   |  24  |  0.65    Ca    8.7      11 Sep 2021 07:02  Phos  2.8     09-11  Mg     2.00     09-11    TPro  7.2  /  Alb  3.6  /  TBili  1.7<H>  /  DBili  x   /  AST  34<H>  /  ALT  20  /  AlkPhos  87  09-10      LIVER FUNCTIONS - ( 10 Sep 2021 07:13 )  Alb: 3.6 g/dL / Pro: 7.2 g/dL / ALK PHOS: 87 U/L / ALT: 20 U/L / AST: 34 U/L / GGT: x               MICROBIOLOGY:      COVID-19 PCR . (09.08.21 @ 12:12)   COVID-19 PCR: NotDetec: You can help in the fight against COVID-19. [employer] may contact   you to see if you are interested in voluntarily participating in one of   our clinical trials.   Testing is performed using polymerase chain reaction (PCR) or   transcription mediated amplification (TMA). This COVID-19 (SARS-CoV-2)   nucleic acid amplification test was validated by "[employer] and is   in use under the FDA Emergency Use Authorization (EUA) for clinical labs   CLIA-certified to perform high complexity testing. Test results should be   correlated with clinical presentation, patient history, and epidemiology.       COVID-19 Wilberto Domain Antibody (09.06.21 @ 10:33)   COVID-19 Wilberto Domain Antibody Result: 0.40: Roche ECLIA Total AB (NENA)   NOTE: This result index represents a total antibody measurement, which   includes IgG, IgA and IgM.   Measures Receptor Binding Domain of the Wilberto Protein   Negative <= 0.79 U/mL   Positive >= 0.80 U/mL U/mL   COVID-19 Wilberto Domain AB Interp: Negative: This test has been authorized for emergency use by the FDA. [employer] has validated this test to be accurate.   Results from antibody testing should not be used to inform infection   status.           RADIOLOGY:    < from: Xray Femur 1 View, Left (09.04.21 @ 19:11) >  FINDINGS/  IMPRESSION:  There is no acute fracture or dislocation. The obturator and pelvic rings are intact. There is generalized osteopenia. If continued concern for fracture, CT would be more sensitive.    < end of copied text >      < from: Xray Pelvis AP only (09.04.21 @ 19:11) >  EXAM:  XR PELVIS AP ONLY 1-2 VIEWS      EXAM:  XR FEMUR 1 VIEW LT        PROCEDURE DATE:  Sep  4 2021         INTERPRETATION:  CLINICAL INFORMATION: Fall, left lower extremity pain    TECHNIQUE: 2 views of the left femur, AP view of the pelvis    COMPARISON: None available    < end of copied text >      < from: Xray Lumbar Spine AP + Lateral (09.04.21 @ 19:11) >  FINDINGS:  Advanced degenerative changes and demineralization significantly limited interpretation of the exam. There is prominent levoscoliosis.    There is loss of the anterior vertebral height at the levels of T12 and L2, however loss vertebral height at T12 is demonstrated on prior chest x-ray.    Mild retrolisthesis of L2 on L3. There is mild degenerative grade 1 anterolisthesis of L4 on L5.    There is atherosclerotic disease.    IMPRESSION:  Loss of anterior vertebral height of the L2 vertebral body is concerning for age indeterminant compression fracture.    Mild retrolisthesis of L2 on L3.    < end of copied text >      < from: Xray Chest 1 View- PORTABLE-Urgent (Xray Chest 1 View- PORTABLE-Urgent .) (09.04.21 @ 19:11) >  FINDINGS:  Trachea is midline.    The heart is borderline enlarged.  There are focal areas of linear atelectasis in the bilateral lower and basilar segments.  Heavy calcification of the annulus of the mitral valve.  There is no pneumothorax,focal consolidation, or pleural effusion.    IMPRESSION: No acute traumatic injury.    < end of copied text >          < from: CT Head No Cont (09.04.21 @ 19:10) >    EXAM:  CT CERVICAL SPINE      EXAM:  CT BRAIN        PROCEDURE DATE:  Sep  4 2021         INTERPRETATION:  CT HEAD, CT CERVICAL SPINE    INDICATIONS: s/p fall, Polish-speaking PMHx dementia (A.O. x1 at baseline), A-fib, HTN who presents after unwitnessed fall. History obtained from daughter at bedside. The patient was found on the floor by the patient's son, she was conscious when found however it was unknown if she had prodromal symptoms nor how long she was down for. The patient herself does not remember the event and is unable to provide further details. Daughter reports she has never had a fall prior. She lives alone and has not taken any of her prescribed medications for over 6 months. The pt reports lower back pain and L pelvic pain, but otherwise has no complaints at this time.    CT BRAIN:    TECHNIQUE:  Multiple contiguous axial images were obtained from the skull base to the vertex without the use of intravenous contrast.    COMPARISON EXAMINATION: Brain MRI 10/8/2014 and headCT 10/4/2014.    FINDINGS:  Ventricles and sulci: Parenchymal volume loss is present which is commensurate with patient age.  Intra-axial: There are hemispheric white matter areas of low attenuation which are nonspecific but likely related to sequelae of microvascular disease. Encephalomalacia/gliosis lower right cerebellar hemisphere, unchanged.  No intracranial mass, acute hemorrhage, or significant midline shift is present.  Extra-axial: There is no extra-axial collection.  Visualized sinuses:No air-fluid levels are identified. Clear.  Visualized mastoids:  Clear.  Calvarium: Unremarkable.  Miscellaneous:  None.    Impression: See below    ======================================================================================      CT CERVICAL SPINE:    TECHNIQUE:  Axial images were obtained through the cervical spine using multislice helical technique.  Reformatted coronal and sagittal images were performed.    COMPARISON EXAMINATION:  None available at this time.    FINDINGS:  On the sagittal reformations, there is no prevertebral soft tissue swelling. There is no splaying of the spinous processes.  On the coronal reformations, occipital condyles are normal. Lateral masses of C1 align normally with C2.  On the axial images, no lucent fracture line is identified.    Multilevel degenerative osteoarthritis is present. Findings include marginal osteophytes, uncovertebral spurring, and facet joint space compartment narrowing with subchondral sclerosis and hypertrophic osteophytes at multiple levels. There is multilevel degenerative disc disease. Findings include loss of normal disc space height and endplate sclerosis. There is suggestion of multilevel fusion.    Miscellaneous:  None.    IMPRESSIONS:    Head CT: No CT evidence of acute intracranial hemorrhage.  Encephalomalacia/gliosis lower right cerebellar hemisphere, unchanged.    C-spine CT:  No acute fracture.    < end of copied text >       Patient is a 89y old  Female who presents with a chief complaint of fall (11 Sep 2021 11:14)    ID CONSULTATION for leukocytosis    HPI:    89 yr old female with a pmh of HTN, HLD, paroxysmal Afib, and dementia AAO x1 at baseline per daughter who has not been taking any of her medication for the past 6 months as per daughter the patient does not feel that she needs them. Presents following an unwitnessed mechanical fall approximately 10:30 am (found 11:30 am by son).   Per patient she did not have any symptoms prior to having her fall and she did not fully recall having the fall. She denies any complaints when asked further. Repeats that she is 89 yrs old already.    Denies  headache, dizziness, chest pain, palpitations, SOB, abdominal pain, joint pain, diarrhea/constipation, urinary symptoms.     Vitals in the ED found to be in afib with 's -> 72, Tmax 97.9, /75, RR 16 satting 100% RA (04 Sep 2021 22:41)    HOSP COURSE:    Pt managed for Afib with RVR, s/p medical managment.  Xray of femur, lumbar spine and pelvis no acute pathology.  CT head and cspine w/o acute findings.    ID consult called for leukocytosis.  WBC increased to 28 on 9/11.  Pt afebrile, not on recent steroids.  Pt with fecal incontinence reported.            REVIEW OF SYSTEMS:    Pt confused, with dementia.  Unable to assess completely    CONSTITUTIONAL: No fever, weight loss, or fatigue  EYES: No eye pain, visual disturbances, or discharge  ENMT:  No sore throat  NECK: No pain or stiffness  RESPIRATORY: No cough, wheezing, chills or hemoptysis; No shortness of breath  CARDIOVASCULAR: No chest pain, palpitations, dizziness, or leg swelling  GASTROINTESTINAL: No abdominal or epigastric pain. No nausea, vomiting, or hematemesis; No diarrhea or constipation. No melena or hematochezia.  GENITOURINARY: No dysuria, frequency, hematuria, or incontinence  NEUROLOGICAL: No headaches, memory loss, loss of strength, numbness, or tremors  SKIN: No itching, burning, rashes, or lesions   LYMPH NODES: No enlarged glands  MUSCULOSKELETAL: No joint pain or swelling; No muscle, back, or extremity pain      PAST MEDICAL & SURGICAL HISTORY:  PAF (paroxysmal atrial fibrillation)    HTN (hypertension)    NO SIGNIFICANT PAST SURGICAL HISTORY        Allergies    No Known Allergies    Intolerances        FAMILY HISTORY:  No pertinent family history in first degree relatives        SOCIAL HISTORY:    No h/o smoking, ivdu, etoh    MEDICATIONS  (STANDING):  apixaban 2.5 milliGRAM(s) Oral every 12 hours  diltiazem    milliGRAM(s) Oral daily  losartan 25 milliGRAM(s) Oral daily  nadolol 40 milliGRAM(s) Oral every 12 hours    MEDICATIONS  (PRN):  acetaminophen   Tablet .. 650 milliGRAM(s) Oral every 6 hours PRN Temp greater or equal to 38.5C (101.3F), Mild Pain (1 - 3)  aluminum hydroxide/magnesium hydroxide/simethicone Suspension 30 milliLiter(s) Oral every 4 hours PRN Dyspepsia  melatonin 3 milliGRAM(s) Oral at bedtime PRN Insomnia  ondansetron Injectable 4 milliGRAM(s) IV Push every 8 hours PRN Nausea and/or Vomiting      Vital Signs Last 24 Hrs  T(C): 36.4 (11 Sep 2021 05:45), Max: 36.4 (10 Sep 2021 22:00)  T(F): 97.5 (11 Sep 2021 05:45), Max: 97.6 (10 Sep 2021 22:00)  HR: 99 (11 Sep 2021 05:45) (85 - 99)  BP: 132/94 (11 Sep 2021 05:45) (132/94 - 153/88)  BP(mean): --  RR: 17 (11 Sep 2021 05:45) (17 - 17)  SpO2: 97% (11 Sep 2021 05:45) (97% - 98%)    PHYSICAL EXAM:    GENERAL: NAD, well-groomed  HEAD:  Atraumatic, Normocephalic  EYES: EOMI, PERRLA, conjunctiva and sclera clear  ENMT: No tonsillar erythema, exudates, or enlargement; Moist mucous membranes  NECK: Supple, No JVD  CHEST/LUNG: Clear to percussion bilaterally; No rales, rhonchi, wheezing, or rubs  HEART: Regular rate and rhythm; No murmurs, rubs, or gallops  ABDOMEN: Soft, Nontender, Nondistended; Bowel sounds present  EXTREMITIES:  2+ Peripheral Pulses, No clubbing, cyanosis, or edema  LYMPH: No lymphadenopathy noted  SKIN: No rashes or lesions    LABS:  CBC Full  -  ( 11 Sep 2021 07:02 )  WBC Count : 28.74 K/uL  RBC Count : 5.17 M/uL  Hemoglobin : 15.1 g/dL  Hematocrit : 47.1 %  Platelet Count - Automated : 272 K/uL  Mean Cell Volume : 91.1 fL  Mean Cell Hemoglobin : 29.2 pg  Mean Cell Hemoglobin Concentration : 32.1 gm/dL  Auto Neutrophil # : x  Auto Lymphocyte # : x  Auto Monocyte # : x  Auto Eosinophil # : x  Auto Basophil # : x  Auto Neutrophil % : x  Auto Lymphocyte % : x  Auto Monocyte % : x  Auto Eosinophil % : x  Auto Basophil % : x      09-11    144  |  105  |  28<H>  ----------------------------<  158<H>  3.5   |  24  |  0.65    Ca    8.7      11 Sep 2021 07:02  Phos  2.8     09-11  Mg     2.00     09-11    TPro  7.2  /  Alb  3.6  /  TBili  1.7<H>  /  DBili  x   /  AST  34<H>  /  ALT  20  /  AlkPhos  87  09-10      LIVER FUNCTIONS - ( 10 Sep 2021 07:13 )  Alb: 3.6 g/dL / Pro: 7.2 g/dL / ALK PHOS: 87 U/L / ALT: 20 U/L / AST: 34 U/L / GGT: x               MICROBIOLOGY:      COVID-19 PCR . (09.08.21 @ 12:12)   COVID-19 PCR: NotDetec: You can help in the fight against COVID-19. [facility] may contact   you to see if you are interested in voluntarily participating in one of   our clinical trials.   Testing is performed using polymerase chain reaction (PCR) or   transcription mediated amplification (TMA). This COVID-19 (SARS-CoV-2)   nucleic acid amplification test was validated by [facility] and is   in use under the FDA Emergency Use Authorization (EUA) for clinical labs   CLIA-certified to perform high complexity testing. Test results should be   correlated with clinical presentation, patient history, and epidemiology.       COVID-19 Wilberto Domain Antibody (09.06.21 @ 10:33)   COVID-19 Wilberto Domain Antibody Result: 0.40: Roche ECLIA Total AB ([facility])   NOTE: This result index represents a total antibody measurement, which   includes IgG, IgA and IgM.   Measures Receptor Binding Domain of the Wilberto Protein   Negative <= 0.79 U/mL   Positive >= 0.80 U/mL U/mL   COVID-19 Wilberto Domain AB Interp: Negative: This test has been authorized for emergency use by the FDA. [facility] has validated this test to be accurate.   Results from antibody testing should not be used to inform infection   status.           RADIOLOGY:    < from: Xray Femur 1 View, Left (09.04.21 @ 19:11) >  FINDINGS/  IMPRESSION:  There is no acute fracture or dislocation. The obturator and pelvic rings are intact. There is generalized osteopenia. If continued concern for fracture, CT would be more sensitive.    < end of copied text >      < from: Xray Pelvis AP only (09.04.21 @ 19:11) >  EXAM:  XR PELVIS AP ONLY 1-2 VIEWS      EXAM:  XR FEMUR 1 VIEW LT        PROCEDURE DATE:  Sep  4 2021         INTERPRETATION:  CLINICAL INFORMATION: Fall, left lower extremity pain    TECHNIQUE: 2 views of the left femur, AP view of the pelvis    COMPARISON: None available    < end of copied text >      < from: Xray Lumbar Spine AP + Lateral (09.04.21 @ 19:11) >  FINDINGS:  Advanced degenerative changes and demineralization significantly limited interpretation of the exam. There is prominent levoscoliosis.    There is loss of the anterior vertebral height at the levels of T12 and L2, however loss vertebral height at T12 is demonstrated on prior chest x-ray.    Mild retrolisthesis of L2 on L3. There is mild degenerative grade 1 anterolisthesis of L4 on L5.    There is atherosclerotic disease.    IMPRESSION:  Loss of anterior vertebral height of the L2 vertebral body is concerning for age indeterminant compression fracture.    Mild retrolisthesis of L2 on L3.    < end of copied text >      < from: Xray Chest 1 View- PORTABLE-Urgent (Xray Chest 1 View- PORTABLE-Urgent .) (09.04.21 @ 19:11) >  FINDINGS:  Trachea is midline.    The heart is borderline enlarged.  There are focal areas of linear atelectasis in the bilateral lower and basilar segments.  Heavy calcification of the annulus of the mitral valve.  There is no pneumothorax,focal consolidation, or pleural effusion.    IMPRESSION: No acute traumatic injury.    < end of copied text >          < from: CT Head No Cont (09.04.21 @ 19:10) >    EXAM:  CT CERVICAL SPINE      EXAM:  CT BRAIN        PROCEDURE DATE:  Sep  4 2021         INTERPRETATION:  CT HEAD, CT CERVICAL SPINE    INDICATIONS: s/p fall, Polish-speaking PMHx dementia (A.O. x1 at baseline), A-fib, HTN who presents after unwitnessed fall. History obtained from daughter at bedside. The patient was found on the floor by the patient's son, she was conscious when found however it was unknown if she had prodromal symptoms nor how long she was down for. The patient herself does not remember the event and is unable to provide further details. Daughter reports she has never had a fall prior. She lives alone and has not taken any of her prescribed medications for over 6 months. The pt reports lower back pain and L pelvic pain, but otherwise has no complaints at this time.    CT BRAIN:    TECHNIQUE:  Multiple contiguous axial images were obtained from the skull base to the vertex without the use of intravenous contrast.    COMPARISON EXAMINATION: Brain MRI 10/8/2014 and headCT 10/4/2014.    FINDINGS:  Ventricles and sulci: Parenchymal volume loss is present which is commensurate with patient age.  Intra-axial: There are hemispheric white matter areas of low attenuation which are nonspecific but likely related to sequelae of microvascular disease. Encephalomalacia/gliosis lower right cerebellar hemisphere, unchanged.  No intracranial mass, acute hemorrhage, or significant midline shift is present.  Extra-axial: There is no extra-axial collection.  Visualized sinuses:No air-fluid levels are identified. Clear.  Visualized mastoids:  Clear.  Calvarium: Unremarkable.  Miscellaneous:  None.    Impression: See below    ======================================================================================      CT CERVICAL SPINE:    TECHNIQUE:  Axial images were obtained through the cervical spine using multislice helical technique.  Reformatted coronal and sagittal images were performed.    COMPARISON EXAMINATION:  None available at this time.    FINDINGS:  On the sagittal reformations, there is no prevertebral soft tissue swelling. There is no splaying of the spinous processes.  On the coronal reformations, occipital condyles are normal. Lateral masses of C1 align normally with C2.  On the axial images, no lucent fracture line is identified.    Multilevel degenerative osteoarthritis is present. Findings include marginal osteophytes, uncovertebral spurring, and facet joint space compartment narrowing with subchondral sclerosis and hypertrophic osteophytes at multiple levels. There is multilevel degenerative disc disease. Findings include loss of normal disc space height and endplate sclerosis. There is suggestion of multilevel fusion.    Miscellaneous:  None.    IMPRESSIONS:    Head CT: No CT evidence of acute intracranial hemorrhage.  Encephalomalacia/gliosis lower right cerebellar hemisphere, unchanged.    C-spine CT:  No acute fracture.    < end of copied text >

## 2021-09-11 NOTE — DIETITIAN INITIAL EVALUATION ADULT. - ADD RECOMMEND
4) Monitor PO intake, tolerance to diet/nutrition supplements, weight trends, hydration status, BMs/GI distress, skin integrity.

## 2021-09-12 NOTE — PROGRESS NOTE ADULT - ASSESSMENT
_________________________________________________________________________________________  ========>>  M E D I C A L   A T T E N D I N G    F O L L O W  U P  N O T E  <<=========  -----------------------------------------------------------------------------------------------------    - Patient evaluated by me, chart reviewed.   - In summary,  MONTANA HSU is a 89y year old woman admitted post fall at home   - Patient today overall doing ok, comfortable, eating fairly      noted events with sudden spike in WBC: reportedly pt had an exposure to a COVID + patient >> awaiting results of COVID test     ==================>> REVIEW OF SYSTEM <<=================    limited ROS due to dementia / AMS    ==================>> PHYSICAL EXAM <<=================    GEN: awake and alert, NAD , comfortable, pleasant, calm at time of exam   HEENT: NCAT, PERRL, MMM, hearing intact  Neck: supple , no JVD appreciated  CVS: S1S2 , Irregular , tachy   PULM: CTA B/L,  no W/R/R appreciated  ABD.: soft. non tender, non distended,  bowel sounds present  Extrem: intact pulses , no edema   PSYCH : normal mood,  not anxious                             ( Note written / Date of service 09-12-21 )    ==================>> MEDICATIONS <<====================    apixaban 2.5 milliGRAM(s) Oral every 12 hours  diltiazem    milliGRAM(s) Oral daily  losartan 25 milliGRAM(s) Oral daily  nadolol 40 milliGRAM(s) Oral every 12 hours    MEDICATIONS  (PRN):  acetaminophen   Tablet .. 650 milliGRAM(s) Oral every 6 hours PRN Temp greater or equal to 38.5C (101.3F), Mild Pain (1 - 3)  aluminum hydroxide/magnesium hydroxide/simethicone Suspension 30 milliLiter(s) Oral every 4 hours PRN Dyspepsia  melatonin 3 milliGRAM(s) Oral at bedtime PRN Insomnia  ondansetron Injectable 4 milliGRAM(s) IV Push every 8 hours PRN Nausea and/or Vomiting    ___________  Active diet:  Diet, Soft:   Supplement Feeding Modality:  Oral  Ensure Enlive Cans or Servings Per Day:  1       Frequency:  Daily  Ensure Pudding Cans or Servings Per Day:  2       Frequency:  Two Times a day  ___________________    ==================>> VITAL SIGNS <<==================    Vital Signs Last 24 HrsT(C): 36.8 (09-12-21 @ 13:35)  T(F): 98.2 (09-12-21 @ 13:35), Max: 98.2 (09-12-21 @ 13:35)  HR: 91 (09-12-21 @ 13:35) (82 - 99)  BP: 121/78 (09-12-21 @ 13:35)  RR: 17 (09-12-21 @ 13:35) (16 - 17)  SpO2: 95% (09-12-21 @ 13:35) (92% - 98%)       ==================>> LAB AND IMAGING <<==================                        14.1   31.13 )-----------( 276      ( 12 Sep 2021 06:15 )             45.2        09-12    147<H>  |  107  |  24<H>  ----------------------------<  135<H>  4.0   |  25  |  0.69    Ca    8.7      12 Sep 2021 06:15  Phos  3.0     09-12  Mg     2.00     09-12    TPro  6.4  /  Alb  3.1<L>  /  TBili  2.2<H>  /  DBili  x   /  AST  22  /  ALT  13  /  AlkPhos  96  09-12    WBC count:   31.13 <<== ,  28.74 <<== ,  12.24 <<== ,  12.32 <<==   Hemoglobin:   14.1 <<==,  15.1 <<==,  13.6 <<==,  12.4 <<==  platelets:  276 <==, 272 <==, 266 <==, 249 <==, 189 <==    Creatinine:  0.69  <<==, 0.65  <<==, 0.84  <<==, 0.81  <<==, 0.66  <<==, 0.69  <<==  Sodium:   147  <==, 144  <==, 140  <==, 136  <==, 138  <==, 136  <==       AST:          22 <== , 34 <== , 32 <== , 29 <==      ALT:        13  <== , 20  <== , 23  <== , 20  <==      AP:        96  <=, 87  <=, 84  <=, 81  <=     Bili:        2.2  <=, 1.7  <=, 2.0  <=, 1.5  <=    _______________________  C U L T U R E S :    COVID-19 PCR: NotDetec (09-08-21 @ 12:12)  COVID-19 PCR: NotDetec (09-04-21 @ 20:24)      < from: Transthoracic Echocardiogram (09.08.21 @ 00:16) >  CONCLUSIONS:  1. Mitral annular calcification, otherwise normal mitral  valve. Mild-moderate mitral regurgitation.  2. Calcified trileaflet aortic valve with normal opening.  Mild aortic regurgitation.  3. Moderately dilated left atrium.  LA volume index = 44 cc/m2.  4. Normal left ventricular internal dimensions and wall thicknesses.  5. Endocardium not well visualized; grossly moderate global  left ventricular systolic dysfunction.  6. Severe right atrial enlargement.  7. Normal right ventricular size and function.  8. Estimated right ventricular systolic pressure equals 63  mm Hg, assuming right atrial pressure equals 10 mm Hg,  consistent with severe pulmonary hypertension.  ------------------------------------------------------------------------  Confirmed on  9/8/2021 - 17:45:25 by Ermias Camara M.D.,  [facility], NATALIE  ------------------------------------------------------------------------  < end of copied text >  ___________________________________________________________________________________  ===============>>  A S S E S S M E N T   EVANGELISTA ROSA   P L A N <<===============  ------------------------------------------------------------------------------------------    · Assessment	  89 yr old female with HTN and paroxysmal afib presenting with afib with RVR and need for placement     Problem/Plan - :  ·  Problem: Leukocytosis    unclear etiology    pending COVID test ( ? recent exposure in a room mate?)     ID on board and appreciated    follow cultures    trend CBC    observing off abx    Problem/Plan - 1:  ·  Problem: New Atrial fibrillation with rapid ventricular response. >> overall stable / improved   Eliquis as ordered >> to reassess later given history of fall and pt lives a lone at home   ECHO as above showing likely chronic systolic CHF and severe pulmonary HTN  Cardiology appreciated  med optimization per cardio / EP  otherwise Dc planing     Problem/Plan - 2:  ·  Problem: Unwitnessed fall. ( pt lives at home alone)   Xray of left femur, lumbar spine and pelvis with no acute pathology  CT head and cervical spine with no acute findings  PT consult.  fall precautions    OOB to chair daily as able     Problem/Plan - 3:  ·  Problem: Benign essential HTN.   Continue Current medications otherwise and monitor.  cardio following     Problem/Plan - 4:  ·  Problem: Dementia.   AAO x1 at baseline per daughter  Re-orientation.  monitor   safe dispo planing ( pt reportedly lived alone at home)     Problem/Plan - 5:  ·  Problem: Social problem.   daughter reports patient lives at home alone and they would like to look for assistance/placement  social work / CM f/u    Problem/Plan - 6:  ·  Problem: HFrEF (heart failure with reduced ejection fraction).   chronic systolic heart failure   Repeat echo. noted   cardio following    -GI/DVT Prophylaxis per protocol.    discussed with NP   ___________________________  HErica Antonio D.O.  Pager: [phone]

## 2021-09-12 NOTE — PROGRESS NOTE ADULT - SUBJECTIVE AND OBJECTIVE BOX
Infectious Diseases progress note:    Subjective:  Events noted.  WBC trending upwards.  Pt appears lethargic, s/p covid swab this AM.  d/w NP earlier today, pt's neighbor tested (+) for Covid.  UA (-) nit/LE.  Cxr with clear lungs.     ROS:  Pt lethargic, unable to assess    Allergies    No Known Allergies    Intolerances        ANTIBIOTICS/RELEVANT:  antimicrobials    immunologic:    OTHER:  acetaminophen   Tablet .. 650 milliGRAM(s) Oral every 6 hours PRN  aluminum hydroxide/magnesium hydroxide/simethicone Suspension 30 milliLiter(s) Oral every 4 hours PRN  apixaban 2.5 milliGRAM(s) Oral every 12 hours  diltiazem    milliGRAM(s) Oral daily  losartan 25 milliGRAM(s) Oral daily  melatonin 3 milliGRAM(s) Oral at bedtime PRN  nadolol 40 milliGRAM(s) Oral every 12 hours  ondansetron Injectable 4 milliGRAM(s) IV Push every 8 hours PRN      Objective:  Vital Signs Last 24 Hrs  T(C): 36.6 (12 Sep 2021 06:18), Max: 36.6 (12 Sep 2021 06:18)  T(F): 97.9 (12 Sep 2021 06:18), Max: 97.9 (12 Sep 2021 06:18)  HR: 82 (12 Sep 2021 06:18) (82 - 99)  BP: 152/81 (12 Sep 2021 06:18) (140/92 - 164/83)  BP(mean): --  RR: 16 (12 Sep 2021 06:18) (16 - 17)  SpO2: 97% (12 Sep 2021 06:18) (92% - 98%)    PHYSICAL EXAM:  Constitutional:NAD  Eyes:BEATRIZ, EOMI  Ear/Nose/Throat: no thrush, mucositis.  Moist mucous membranes	  Neck:no JVD, no lymphadenopathy, supple  Respiratory: CTA rigoberto  Cardiovascular: S1S2 RRR, no murmurs  Gastrointestinal:soft, nontender,  nondistended (+) BS  Extremities:no e/e/c  Skin:  no rashes, open wounds or ulcerations        LABS:                        14.1   31.13 )-----------( 276      ( 12 Sep 2021 06:15 )             45.2     09-12    147<H>  |  107  |  24<H>  ----------------------------<  135<H>  4.0   |  25  |  0.69    Ca    8.7      12 Sep 2021 06:15  Phos  3.0       Mg     2.00         TPro  6.4  /  Alb  3.1<L>  /  TBili  2.2<H>  /  DBili  x   /  AST  22  /  ALT  13  /  AlkPhos  96        Urinalysis Basic - ( 11 Sep 2021 18:41 )    Color: Yellow / Appearance: Clear / S.027 / pH: x  Gluc: x / Ketone: Small  / Bili: Small / Urobili: 6 mg/dL   Blood: x / Protein: 30 mg/dL / Nitrite: Negative   Leuk Esterase: Negative / RBC: 5 /HPF / WBC 1 /HPF   Sq Epi: x / Non Sq Epi: 1 /HPF / Bacteria: Occasional          MICROBIOLOGY:    Urinalysis (21 @ 18:41)   Glucose Qualitative, Urine: Negative   Blood, Urine: Small   pH Urine: 6.0   Color: Yellow   Urine Appearance: Clear   Bilirubin: Small   Ketone - Urine: Small   Specific Gravity: 1.027   Protein, Urine: 30 mg/dL   Urobilinogen: 6 mg/dL   Nitrite: Negative   Leukocyte Esterase Concentration: Negative       RADIOLOGY & ADDITIONAL STUDIES:    < from: Xray Chest 1 View- PORTABLE-Routine (Xray Chest 1 View- PORTABLE-Routine .) (21 @ 17:18) >    EXAM:  XR CHEST PORTABLE ROUTINE 1V        PROCEDURE DATE:  Sep 11 2021         INTERPRETATION:  Chest one view    HISTORY: Leukocytosis    COMPARISON STUDY: 2021    Frontal expiratory view of the chest shows the heart to be similarly enlarged in size. The lungs are clear and there is no evidence of pneumothorax nor pleural effusion. Mitral annulus calcification is again noted.    IMPRESSION:  No active pulmonary disease.    < end of copied text >

## 2021-09-12 NOTE — PROGRESS NOTE ADULT - ASSESSMENT
89 yr old female with a pmh of HTN, HLD, paroxysmal Afib, and dementia AAO x1 at baseline per daughter who has not been taking any of her medication for the past 6 months as per daughter the patient does not feel that she needs them. Presents following an unwitnessed mechanical fall approximately 10:30 am (found 11:30 am by son).   Per patient she did not have any symptoms prior to having her fall and she did not fully recall having the fall. She denies any complaints when asked further. Repeats that she is 89 yrs old already.    Denies  headache, dizziness, chest pain, palpitations, SOB, abdominal pain, joint pain, diarrhea/constipation, urinary symptoms.     Vitals in the ED found to be in afib with 's -> 72, Tmax 97.9, /75, RR 16 satting 100% RA (04 Sep 2021 22:41)    HOSP COURSE:    Pt managed for Afib with RVR, s/p medical managment.  Xray of femur, lumbar spine and pelvis no acute pathology.  CT head and cspine w/o acute findings.    ID consult called for leukocytosis.  WBC increased to 28 on 9/11.  Pt afebrile, not on recent steroids.  Pt with fecal incontinence reported.        Leukocytosis:    - UA (-).  Repeat chest xray without active pulmonary disease.  Pt currently w/o localizing signs/symptoms on clinical exam, vss, afebrile, normotensive.  No bedside cough, no reported diarrhea, no rash or thrombophlebitis at IV sites, no joint effusions.   - Repeat cbc with differential in AM - continues to trend upwards.  Send blood cx x 2.  Repeat covid swab sent.   - Monitor off abx at this time until further w/u available.  - Will reassess need for further laboratory w/u or diagnositic imaging pending above studies.       d/w Medicine NP    Will follow,    Mona Yo  [phone]

## 2021-09-13 NOTE — PROGRESS NOTE ADULT - SUBJECTIVE AND OBJECTIVE BOX
Cardiovascular Disease Progress Note    Overnight events: No acute events overnight.  wbc improving. no new cardiac sx  Otherwise review of systems negative    Objective Findings:  T(C): 36.2 (09-13-21 @ 05:49), Max: 36.8 (09-12-21 @ 13:35)  HR: 78 (09-13-21 @ 05:49) (78 - 91)  BP: 148/81 (09-13-21 @ 05:49) (121/78 - 148/81)  RR: 16 (09-13-21 @ 05:49) (16 - 17)  SpO2: 96% (09-13-21 @ 05:49) (95% - 98%)  Wt(kg): --  Daily     Daily       Physical Exam:  Gen: NAD  HEENT: EOMI  CV: RRR, normal S1 + S2, no m/r/g  Lungs: CTAB  Abd: soft, non-tender  Ext: No edema    Telemetry:    Laboratory Data:                        14.8   24.95 )-----------( 286      ( 13 Sep 2021 06:40 )             48.2     09-13    150<H>  |  109<H>  |  28<H>  ----------------------------<  144<H>  3.4<L>   |  25  |  0.68    Ca    9.1      13 Sep 2021 06:40  Phos  2.6     09-13  Mg     2.20     09-13    TPro  7.2  /  Alb  3.3  /  TBili  2.2<H>  /  DBili  x   /  AST  24  /  ALT  14  /  AlkPhos  110  09-13              Inpatient Medications:  MEDICATIONS  (STANDING):  apixaban 2.5 milliGRAM(s) Oral every 12 hours  diltiazem    milliGRAM(s) Oral daily  losartan 25 milliGRAM(s) Oral daily  nadolol 40 milliGRAM(s) Oral every 12 hours      Assessment:  persistent atrial fibrillation  chronic systolic HF --> mod global lv dysfunction  severe pHTN  leukocytosis  hypernatremia    Recs:  cv stable  no e/o acs  elevated JVP on exam, but with poor po intake and elevated BUN. monitor off diuretics for now. no objection to gentle IV hydration  cw diltiazem and nadolol as dosed. cw eliquis for stroke prevention  gdmt for lv dysfunction --> beta blockers and ARB. defer ischemic eval for now. 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 defer rhc and ischemic eval for now. diuretics prn  monitor on tele  eps f/u        Over 25 minutes spent on total encounter; more than 50% of the visit was spent counseling and/or coordinating care by the attending physician.      Felipe Hills MD   Cardiovascular Disease  [phone]

## 2021-09-13 NOTE — PROGRESS NOTE ADULT - SUBJECTIVE AND OBJECTIVE BOX
Infectious Diseases progress note:    Subjective:  NAD, afebrile, resting comfortably, offers no complaints.  WBC improved today.  Covid swab neg.  On isolation precautions as pt's neighbor tested positive.     ROS:  CONSTITUTIONAL:  No fever, chills, rigors  CARDIOVASCULAR:  No chest pain or palpitations  RESPIRATORY:   No SOB, cough, dyspnea on exertion.  No wheezing  GASTROINTESTINAL:  No abd pain, N/V, diarrhea/constipation  EXTREMITIES:  No swelling or joint pain  GENITOURINARY:  No burning on urination, increased frequency or urgency.  No flank pain  NEUROLOGIC:  No HA, visual disturbances  SKIN: No rashes    Allergies    No Known Allergies    Intolerances        ANTIBIOTICS/RELEVANT:  antimicrobials    immunologic:    OTHER:  acetaminophen   Tablet .. 650 milliGRAM(s) Oral every 6 hours PRN  aluminum hydroxide/magnesium hydroxide/simethicone Suspension 30 milliLiter(s) Oral every 4 hours PRN  apixaban 2.5 milliGRAM(s) Oral every 12 hours  diltiazem    milliGRAM(s) Oral daily  losartan 25 milliGRAM(s) Oral daily  melatonin 3 milliGRAM(s) Oral at bedtime PRN  nadolol 40 milliGRAM(s) Oral every 12 hours  ondansetron Injectable 4 milliGRAM(s) IV Push every 8 hours PRN  potassium chloride    Tablet ER 20 milliEquivalent(s) Oral once      Objective:  Vital Signs Last 24 Hrs  T(C): 36.2 (13 Sep 2021 12:20), Max: 36.7 (12 Sep 2021 21:38)  T(F): 97.1 (13 Sep 2021 12:20), Max: 98.1 (12 Sep 2021 21:38)  HR: 88 (13 Sep 2021 12:20) (78 - 88)  BP: 121/64 (13 Sep 2021 12:20) (121/64 - 148/81)  BP(mean): --  RR: 17 (13 Sep 2021 12:20) (16 - 17)  SpO2: 96% (13 Sep 2021 12:20) (96% - 98%)    PHYSICAL EXAM:  Constitutional:NAD  Eyes:BEATRIZ, EOMI  Ear/Nose/Throat: no thrush, mucositis.  Moist mucous membranes	  Neck:no JVD, no lymphadenopathy, supple  Respiratory: CTA rigoberto  Cardiovascular: S1S2 RRR, no murmurs  Gastrointestinal:soft, nontender,  nondistended (+) BS  Extremities:no e/e/c  Skin:  no rashes, open wounds or ulcerations        LABS:                        14.8   24.95 )-----------( 286      ( 13 Sep 2021 06:40 )             48.2         150<H>  |  109<H>  |  28<H>  ----------------------------<  144<H>  3.4<L>   |  25  |  0.68    Ca    9.1      13 Sep 2021 06:40  Phos  2.6       Mg     2.20         TPro  7.2  /  Alb  3.3  /  TBili  2.2<H>  /  DBili  x   /  AST  24  /  ALT  14  /  AlkPhos  110        Urinalysis Basic - ( 11 Sep 2021 18:41 )    Color: Yellow / Appearance: Clear / S.027 / pH: x  Gluc: x / Ketone: Small  / Bili: Small / Urobili: 6 mg/dL   Blood: x / Protein: 30 mg/dL / Nitrite: Negative   Leuk Esterase: Negative / RBC: 5 /HPF / WBC 1 /HPF   Sq Epi: x / Non Sq Epi: 1 /HPF / Bacteria: Occasional          MICROBIOLOGY:    COVID-19 PCR . (21 @ 11:39)   COVID-19 PCR: NotDetec: You can help in the fight against COVID-19. [facility] may contact   you to see if you are interested in voluntarily participating in one of   our clinical trials.   Testing is performed using polymerase chain reaction (PCR) or   transcription mediated amplification (TMA). This COVID-19 (SARS-CoV-2)   nucleic acid amplification test was validated by [facility] and is   in use under the FDA Emergency Use Authorization (EUA) for clinical labs   CLIA-certified to perform high complexity testing. Test results should be   correlated with clinical presentation, patient history, and epidemiology.       RADIOLOGY & ADDITIONAL STUDIES:    < from: Xray Chest 1 View- PORTABLE-Routine (Xray Chest 1 View- PORTABLE-Routine .) (21 @ 17:18) >  IMPRESSION:  No active pulmonary disease.    Thank you for the courtesy of this referral.    --- End of Report ---    < end of copied text >

## 2021-09-13 NOTE — PROGRESS NOTE ADULT - ASSESSMENT
_________________________________________________________________________________________  ========>>  M E D I C A L   A T T E N D I N G    F O L L O W  U P  N O T E  <<=========  -----------------------------------------------------------------------------------------------------    - Patient evaluated by me, chart reviewed.   - In summary,  MONTANA HSU is a 89y year old woman admitted post fall at home   - Patient today overall doing ok, comfortable, eating fairly ( need a lot of assistance)     ==================>> REVIEW OF SYSTEM <<=================    limited ROS due to dementia / AMS    ==================>> PHYSICAL EXAM <<=================    GEN: awake and alert, NAD , comfortable, pleasant, calm at time of exam , taking a little protein supplement from me   HEENT: NCAT, PERRL, MMM, hearing intact  Neck: supple , no JVD appreciated  CVS: S1S2 , Irregular , tachy   PULM: CTA B/L,  no W/R/R appreciated  ABD.: soft. non tender, non distended,  bowel sounds present  Extrem: intact pulses , no edema   PSYCH : normal mood,  not anxious                             ( Note written / Date of service 09-13-21 )    ==================>> MEDICATIONS <<====================    apixaban 2.5 milliGRAM(s) Oral every 12 hours  diltiazem    milliGRAM(s) Oral daily  losartan 25 milliGRAM(s) Oral daily  nadolol 40 milliGRAM(s) Oral every 12 hours  potassium chloride    Tablet ER 20 milliEquivalent(s) Oral once    MEDICATIONS  (PRN):  acetaminophen   Tablet .. 650 milliGRAM(s) Oral every 6 hours PRN Temp greater or equal to 38.5C (101.3F), Mild Pain (1 - 3)  aluminum hydroxide/magnesium hydroxide/simethicone Suspension 30 milliLiter(s) Oral every 4 hours PRN Dyspepsia  melatonin 3 milliGRAM(s) Oral at bedtime PRN Insomnia  ondansetron Injectable 4 milliGRAM(s) IV Push every 8 hours PRN Nausea and/or Vomiting    ___________  Active diet:  Diet, Soft:   Supplement Feeding Modality:  Oral  Ensure Enlive Cans or Servings Per Day:  1       Frequency:  Daily  Ensure Pudding Cans or Servings Per Day:  2       Frequency:  Two Times a day  ___________________    ==================>> VITAL SIGNS <<==================    Vital Signs Last 24 HrsT(C): 36.2 (09-13-21 @ 12:20)  T(F): 97.1 (09-13-21 @ 12:20), Max: 98.1 (09-12-21 @ 21:38)  HR: 88 (09-13-21 @ 12:20) (78 - 88)  BP: 121/64 (09-13-21 @ 12:20)  RR: 17 (09-13-21 @ 12:20) (16 - 17)  SpO2: 96% (09-13-21 @ 12:20) (96% - 98%)       ==================>> LAB AND IMAGING <<==================                        14.8   24.95 )-----------( 286      ( 13 Sep 2021 06:40 )             48.2        09-13    150<H>  |  109<H>  |  28<H>  ----------------------------<  144<H>  3.4<L>   |  25  |  0.68    Ca    9.1      13 Sep 2021 06:40  Phos  2.6     09-13  Mg     2.20     09-13    TPro  7.2  /  Alb  3.3  /  TBili  2.2<H>  /  DBili  x   /  AST  24  /  ALT  14  /  AlkPhos  110  09-13    WBC count:   24.95 <<== ,  31.13 <<== ,  28.74 <<== ,  12.24 <<==   Hemoglobin:   14.8 <<==,  14.1 <<==,  15.1 <<==,  13.6 <<==  platelets:  286 <==, 276 <==, 272 <==, 266 <==, 249 <==    Creatinine:  0.68  <<==, 0.69  <<==, 0.65  <<==, 0.84  <<==, 0.81  <<==, 0.66  <<==  Sodium:   150  <==, 147  <==, 144  <==, 140  <==, 136  <==, 138  <==       AST:          24 <== , 22 <== , 34 <== , 32 <==      ALT:        14  <== , 13  <== , 20  <== , 23  <==      AP:        110  <=, 96  <=, 87  <=, 84  <=     Bili:        2.2  <=, 2.2  <=, 1.7  <=, 2.0  <=    _______________________  C U L T U R E S :    COVID-19 PCR: NotDetec (09-12-21 @ 11:39)  COVID-19 PCR: NotDetec (09-08-21 @ 12:12)  COVID-19 PCR: NotDetec (09-04-21 @ 20:24)    CT scan pending     < from: Transthoracic Echocardiogram (09.08.21 @ 00:16) >  CONCLUSIONS:  1. Mitral annular calcification, otherwise normal mitral  valve. Mild-moderate mitral regurgitation.  2. Calcified trileaflet aortic valve with normal opening.  Mild aortic regurgitation.  3. Moderately dilated left atrium.  LA volume index = 44 cc/m2.  4. Normal left ventricular internal dimensions and wall thicknesses.  5. Endocardium not well visualized; grossly moderate global  left ventricular systolic dysfunction.  6. Severe right atrial enlargement.  7. Normal right ventricular size and function.  8. Estimated right ventricular systolic pressure equals 63  mm Hg, assuming right atrial pressure equals 10 mm Hg,  consistent with severe pulmonary hypertension.  ------------------------------------------------------------------------  Confirmed on  9/8/2021 - 17:45:25 by Ermias Camara M.D.,  [facility], NATALIE  ------------------------------------------------------------------------  < end of copied text >  ___________________________________________________________________________________  ===============>>  A S S E S S M E N T   A N D   P L A N <<===============  ------------------------------------------------------------------------------------------    · Assessment	  89 yr old female with HTN and paroxysmal afib presenting with afib with RVR and need for placement     Problem/Plan - :  ·  Problem: Leukocytosis    unclear etiology        ? recent exposure in a roommate?    ID on board and appreciated    follow cultures    follow CT    trend CBC    observing off abx    gentle IVH given hypernatremia    Problem/Plan - 1:  ·  Problem: New Atrial fibrillation with rapid ventricular response. >> overall stable / improved   Eliquis as ordered >> to reassess later given history of fall and pt lives a lone at home   ECHO as above showing likely chronic systolic CHF and severe pulmonary HTN  Cardiology appreciated  med optimization per cardio / EP  otherwise Dc planing     Problem/Plan - 2:  ·  Problem: Unwitnessed fall. ( pt lives at home alone)   Xray of left femur, lumbar spine and pelvis with no acute pathology  CT head and cervical spine with no acute findings  PT consult.  fall precautions    OOB to chair daily as able     Problem/Plan - 3:  ·  Problem: Benign essential HTN.   Continue Current medications otherwise and monitor.  cardio following     Problem/Plan - 4:  ·  Problem: Dementia.   AAO x1 at baseline per daughter  Re-orientation.  monitor   safe dispo planing ( pt reportedly lived alone at home)     Problem/Plan - 5:  ·  Problem: Social problem.   daughter reports patient lives at home alone and they would like to look for assistance/placement  social work / CM f/u    Problem/Plan - 6:  ·  Problem: HFrEF (heart failure with reduced ejection fraction).   chronic systolic heart failure   Repeat echo. noted   cardio following    -GI/DVT Prophylaxis per protocol.    discussed with NP   ___________________________  SOFIA Antonio D.O.  Pager: [phone]

## 2021-09-13 NOTE — PROGRESS NOTE ADULT - ASSESSMENT
89 yr old female with a pmh of HTN, HLD, paroxysmal Afib, and dementia AAO x1 at baseline per daughter who has not been taking any of her medication for the past 6 months as per daughter the patient does not feel that she needs them. Presents following an unwitnessed mechanical fall approximately 10:30 am (found 11:30 am by son).   Per patient she did not have any symptoms prior to having her fall and she did not fully recall having the fall. She denies any complaints when asked further. Repeats that she is 89 yrs old already.    Denies  headache, dizziness, chest pain, palpitations, SOB, abdominal pain, joint pain, diarrhea/constipation, urinary symptoms.     Vitals in the ED found to be in afib with 's -> 72, Tmax 97.9, /75, RR 16 satting 100% RA (04 Sep 2021 22:41)    HOSP COURSE:    Pt managed for Afib with RVR, s/p medical managment.  Xray of femur, lumbar spine and pelvis no acute pathology.  CT head and cspine w/o acute findings.    ID consult called for leukocytosis.  WBC increased to 28 on 9/11.  Pt afebrile, not on recent steroids.  Pt with fecal incontinence reported.        Leukocytosis:    - UA (-).  Repeat chest xray without active pulmonary disease.  Pt currently w/o localizing signs/symptoms on clinical exam, vss, afebrile, normotensive.  No bedside cough, no reported diarrhea, no rash or thrombophlebitis at IV sites, no joint effusions.   - Repeat cbc with differential in AM - continues to trend upwards.  Send blood cx x 2.  Repeat covid swab sent - negative.  Pt on isolation precautions for covid exposure.   - Monitor off abx at this time until further w/u available.  - Will reassess need for further laboratory w/u or diagnositic imaging pending above studies.       * WBC downtrending, monitor off abx.      Will follow,    Mona Yo  [phone]

## 2021-09-14 NOTE — CHART NOTE - NSCHARTNOTEFT_GEN_A_CORE
Patient with positive blood culture, staph aureus.   Spoke with Dr. Yo, Cefazolin 2gm IVBP q 8 hours started  repeat blood cultures ordered  Prelim CT C/A/P suggestive of possible pulm infection, will await final report for further recommendations/ imaging from ID Patient with positive blood culture, staph aureus.   Spoke with Dr. Yo, Cefazolin 2gm IVBP q 8 hours started  repeat blood cultures ordered  Prelim CT C/A/P suggestive of possible pulm infection, will await final report for further recommendations/ imaging from ID  daughter Crystal updated on plan of care

## 2021-09-14 NOTE — PROGRESS NOTE ADULT - ASSESSMENT
_________________________________________________________________________________________  ========>>  M E D I C A L   A T T E N D I N G    F O L L O W  U P  N O T E  <<=========  -----------------------------------------------------------------------------------------------------    - Patient evaluated by me, chart reviewed.   - In summary,  MONTANA HSU is a 89y year old woman admitted post fall at home   - Patient today overall doing ok, comfortable, eating poorly per RN  ( need a lot of assistance) >> encouraged     ==================>> REVIEW OF SYSTEM <<=================    limited ROS due to dementia / AMS    ==================>> PHYSICAL EXAM <<=================    GEN: awake and alert, NAD , comfortable, pleasant, calm at time of exam   HEENT: NCAT, PERRL, MMM, hearing intact  Neck: supple , no JVD appreciated  CVS: S1S2 , Irregular , tachy   PULM: CTA B/L,  no W/R/R appreciated  ABD.: soft. non tender, non distended,  bowel sounds present  Extrem: intact pulses , no edema   PSYCH : normal mood,  not anxious                             ( Note written / Date of service 09-14-21 )    ==================>> MEDICATIONS <<====================    apixaban 2.5 milliGRAM(s) Oral every 12 hours  ceFAZolin   IVPB 2000 milliGRAM(s) IV Intermittent every 8 hours  ceFAZolin   IVPB      diltiazem    milliGRAM(s) Oral daily  losartan 25 milliGRAM(s) Oral daily  nadolol 40 milliGRAM(s) Oral every 12 hours  polyethylene glycol 3350 17 Gram(s) Oral daily  potassium chloride   Powder 40 milliEquivalent(s) Oral every 4 hours  senna 2 Tablet(s) Oral at bedtime  sodium chloride 0.45%. 1000 milliLiter(s) IV Continuous <Continuous>    MEDICATIONS  (PRN):  acetaminophen   Tablet .. 650 milliGRAM(s) Oral every 6 hours PRN Temp greater or equal to 38.5C (101.3F), Mild Pain (1 - 3)  aluminum hydroxide/magnesium hydroxide/simethicone Suspension 30 milliLiter(s) Oral every 4 hours PRN Dyspepsia  melatonin 3 milliGRAM(s) Oral at bedtime PRN Insomnia  ondansetron Injectable 4 milliGRAM(s) IV Push every 8 hours PRN Nausea and/or Vomiting    ___________  Active diet:  Diet, Soft:   Supplement Feeding Modality:  Oral  Ensure Enlive Cans or Servings Per Day:  1       Frequency:  Daily  Ensure Pudding Cans or Servings Per Day:  2       Frequency:  Two Times a day  ___________________    ==================>> VITAL SIGNS <<==================    Vital Signs Last 24 HrsT(C): 36.6 (09-14-21 @ 05:47)  T(F): 97.9 (09-14-21 @ 05:47), Max: 98.1 (09-13-21 @ 17:24)  HR: 90 (09-14-21 @ 05:47) (78 - 90)  BP: 145/92 (09-14-21 @ 05:47)  RR: 17 (09-14-21 @ 05:47) (17 - 18)  SpO2: 95% (09-14-21 @ 05:47) (95% - 97%)       ==================>> LAB AND IMAGING <<==================                        13.1   17.84 )-----------( 279      ( 14 Sep 2021 07:07 )             42.7        09-14    149<H>  |  113<H>  |  31<H>  ----------------------------<  147<H>  3.2<L>   |  26  |  0.65    Ca    8.6      14 Sep 2021 07:07  Phos  2.5     09-14  Mg     2.00     09-14    TPro  6.1  /  Alb  2.8<L>  /  TBili  1.6<H>  /  DBili  x   /  AST  36<H>  /  ALT  19  /  AlkPhos  109  09-14    WBC count:   17.84 <<== ,  24.95 <<== ,  31.13 <<== ,  28.74 <<==   Hemoglobin:   13.1 <<==,  14.8 <<==,  14.1 <<==,  15.1 <<==  platelets:  279 <==, 286 <==, 276 <==, 272 <==, 266 <==    Creatinine:  0.65  <<==, 0.68  <<==, 0.69  <<==, 0.65  <<==, 0.84  <<==, 0.81  <<==  Sodium:   149  <==, 150  <==, 147  <==, 144  <==, 140  <==, 136  <==       AST:          36 <== , 24 <== , 22 <== , 34 <==      ALT:        19  <== , 14  <== , 13  <== , 20  <==      AP:        109  <=, 110  <=, 96  <=, 87  <=     Bili:        1.6  <=, 2.2  <=, 2.2  <=, 1.7  <=    _______________________  C U L T U R E S :    Culture - Blood (collected 12 Sep 2021 16:38)  Source: .Blood Blood-Peripheral  Gram Stain (14 Sep 2021 04:57):    Growth in anaerobic bottle:    Gram positive cocci in pairs  Preliminary Report (14 Sep 2021 04:57):    Growth in anaerobic bottle:    Gram positive cocci in pairs    ***Blood Panel PCR results on this specimen are available    approximately 3 hours after the Gram stain result.***    Gram stain, PCR, and/or culture results may not always    correspond due to difference in methodologies.    ************************************************************    This PCR assay was performed by multiplex PCR. This    Assay tests for 66 bacterial and resistance gene targets.    Please refer to the [facility] test directory    at [email or web address] for details.  Organism: Blood Culture PCR (14 Sep 2021 06:42)  Organism: Blood Culture PCR (14 Sep 2021 06:42)    Sensitivities:      -  Staphylococcus aureus: Detec Any isolate of Staphylococcus aureus from a blood culture is NOT considered a contaminant.      Method Type: PCR    Culture - Blood (collected 12 Sep 2021 16:38)  Source: .Blood Blood-Peripheral  Preliminary Report (13 Sep 2021 17:01):    No growth to date.    COVID-19 PCR: NotDetec (09-12-21 @ 11:39)  COVID-19 PCR: NotDetec (09-08-21 @ 12:12)  COVID-19 PCR: NotDetec (09-04-21 @ 20:24)      < from: CT Chest No Cont (09.13.21 @ 17:08) >  IMPRESSION:  Tree in bud nodules in the bilateral lower lobes and peribronchial thickening represent small airways disease. Consider infectious bronchiolitis.  Findings suggesting chronic constipation.  Substernal extension of thyroid goiter.  < end of copied text >    < from: Transthoracic Echocardiogram (09.08.21 @ 00:16) >  CONCLUSIONS:  1. Mitral annular calcification, otherwise normal mitral  valve. Mild-moderate mitral regurgitation.  2. Calcified trileaflet aortic valve with normal opening.  Mild aortic regurgitation.  3. Moderately dilated left atrium.  LA volume index = 44 cc/m2.  4. Normal left ventricular internal dimensions and wall thicknesses.  5. Endocardium not well visualized; grossly moderate global  left ventricular systolic dysfunction.  6. Severe right atrial enlargement.  7. Normal right ventricular size and function.  8. Estimated right ventricular systolic pressure equals 63  mm Hg, assuming right atrial pressure equals 10 mm Hg,  consistent with severe pulmonary hypertension.  ------------------------------------------------------------------------  Confirmed on  9/8/2021 - 17:45:25 by Ermias Camara M.D.,  [facility], [place]IE  ------------------------------------------------------------------------  < end of copied text >  ___________________________________________________________________________________  ===============>>  A S S E S S M E N T   A N D   P L A N <<===============  ------------------------------------------------------------------------------------------    · Assessment	  89 yr old female with HTN and paroxysmal afib presenting with afib with RVR and need for placement     Problem/Plan - :  ·  Problem: Leukocytosis: bacteremia, likely developing pneumonia         recent COVID exposure but testing negative     ID on board and appreciated / discussed     follow cultures / repeat cultures til clearance    Abx as started >> eventually will need PICC line     trend CBC    gentle IVH as needed for hypernatremia    discussed with RN to assist with feeding / nutrition hydration    Problem/Plan - 1:  ·  Problem: New Atrial fibrillation with rapid ventricular response. >> overall stable / improved   Eliquis as ordered >> to reassess later given history of fall and pt lives a lone at home   ECHO as above showing likely chronic systolic CHF and severe pulmonary HTN  Cardiology appreciated  med optimization per cardio / EP  otherwise Dc planing     Problem/Plan - 2:  ·  Problem: Unwitnessed fall. ( pt lives at home alone)   Xray of left femur, lumbar spine and pelvis with no acute pathology  CT head and cervical spine with no acute findings  PT consult.  fall precautions    OOB to chair daily as able     Problem/Plan - 3:  ·  Problem: Benign essential HTN.   Continue Current medications otherwise and monitor.  cardio following     Problem/Plan - 4:  ·  Problem: Dementia.   AAO x1 at baseline per daughter  Re-orientation.  monitor   safe dispo planing ( pt reportedly lived alone at home)     Problem/Plan - 5:  ·  Problem: Social problem.   daughter reports patient lives at home alone and they would like to look for assistance/placement  social work / CM f/u    Problem/Plan - 6:  ·  Problem: HFrEF (heart failure with reduced ejection fraction).   chronic systolic heart failure   Repeat echo. noted   cardio following    -GI/DVT Prophylaxis per protocol.    discussed with pt's Dtr and updated : all questions answered     discussed with ID   ___________________________  H. QUENTIN Antonio.  Pager: [phone]

## 2021-09-14 NOTE — PHARMACOTHERAPY INTERVENTION NOTE - COMMENTS
89 F with MSSA bacteremia (MecA not detected).    Recommendation(s):  1) Recommended to initiate cefazolin 2 g IV q8h (CrCl > 34 mL/min)    Alex BellD, [facility]  Clinical Pharmacy Specialist, Infectious Diseases  Spectra extension [phone]  .

## 2021-09-14 NOTE — PROGRESS NOTE ADULT - SUBJECTIVE AND OBJECTIVE BOX
Infectious Diseases progress note:    Subjective: NAD, afebrile.  Bcx growing Staph aureus.  WBC improving, no acute o/n events.     ROS:  CONSTITUTIONAL:  No fever, chills, rigors  CARDIOVASCULAR:  No chest pain or palpitations  RESPIRATORY:   No SOB, cough, dyspnea on exertion.  No wheezing  GASTROINTESTINAL:  No abd pain, N/V, diarrhea/constipation  EXTREMITIES:  No swelling or joint pain  GENITOURINARY:  No burning on urination, increased frequency or urgency.  No flank pain  NEUROLOGIC:  No HA, visual disturbances  SKIN: No rashes    Allergies    No Known Allergies    Intolerances        ANTIBIOTICS/RELEVANT:  antimicrobials  ceFAZolin   IVPB 2000 milliGRAM(s) IV Intermittent every 8 hours  ceFAZolin   IVPB        immunologic:    OTHER:  acetaminophen   Tablet .. 650 milliGRAM(s) Oral every 6 hours PRN  aluminum hydroxide/magnesium hydroxide/simethicone Suspension 30 milliLiter(s) Oral every 4 hours PRN  apixaban 2.5 milliGRAM(s) Oral every 12 hours  diltiazem    milliGRAM(s) Oral daily  losartan 25 milliGRAM(s) Oral daily  melatonin 3 milliGRAM(s) Oral at bedtime PRN  nadolol 40 milliGRAM(s) Oral every 12 hours  ondansetron Injectable 4 milliGRAM(s) IV Push every 8 hours PRN  polyethylene glycol 3350 17 Gram(s) Oral daily  potassium chloride   Powder 40 milliEquivalent(s) Oral every 4 hours  senna 2 Tablet(s) Oral at bedtime  sodium chloride 0.45%. 1000 milliLiter(s) IV Continuous <Continuous>      Objective:  Vital Signs Last 24 Hrs  T(C): 36.6 (14 Sep 2021 05:47), Max: 36.7 (13 Sep 2021 17:24)  T(F): 97.9 (14 Sep 2021 05:47), Max: 98.1 (13 Sep 2021 17:24)  HR: 90 (14 Sep 2021 05:47) (78 - 90)  BP: 145/92 (14 Sep 2021 05:47) (124/61 - 145/92)  BP(mean): --  RR: 17 (14 Sep 2021 05:47) (17 - 18)  SpO2: 95% (14 Sep 2021 05:47) (95% - 97%)    PHYSICAL EXAM:  Constitutional:NAD  Eyes:BEATRIZ, EOMI  Ear/Nose/Throat: no thrush, mucositis.  Moist mucous membranes	  Neck:no JVD, no lymphadenopathy, supple  Respiratory: CTA rigoberto  Cardiovascular: S1S2 RRR, no murmurs  Gastrointestinal:soft, nontender,  nondistended (+) BS  Extremities:no e/e/c  Skin:  no rashes, open wounds or ulcerations        LABS:                        13.1   17.84 )-----------( 279      ( 14 Sep 2021 07:07 )             42.7     09-14    149<H>  |  113<H>  |  31<H>  ----------------------------<  147<H>  3.2<L>   |  26  |  0.65    Ca    8.6      14 Sep 2021 07:07  Phos  2.5     09-14  Mg     2.00     09-14    TPro  6.1  /  Alb  2.8<L>  /  TBili  1.6<H>  /  DBili  x   /  AST  36<H>  /  ALT  19  /  AlkPhos  109  09-14        MICROBIOLOGY:    Culture - Blood (09.12.21 @ 16:38)   Gram Stain:   Growth in anaerobic bottle:   Gram positive cocci in pairs   - Staphylococcus aureus: Detec Any isolate of Staphylococcus aureus from a blood culture is NOT considered a contaminant.   Specimen Source: .Blood Blood-Peripheral   Organism: Blood Culture PCR   Culture Results:   Growth in anaerobic bottle:   Gram positive cocci in pairs   ***Blood Panel PCR results on this specimen are available   approximately 3 hours after the Gram stain result.***   Gram stain, PCR, and/or culture results may not always   correspond due to difference in methodologies.   ************************************************************   This PCR assay was performed by multiplex PCR. This   Assay tests for 66 bacterial and resistance gene targets.   Please refer to the [facility] test directory   at [email or web address] for details.   Organism Identification: Blood Culture PCR   Method Type: PCR     Culture - Blood (09.12.21 @ 16:38)   Specimen Source: .Blood Blood-Peripheral   Culture Results:   No growth to date.           RADIOLOGY & ADDITIONAL STUDIES:    < from: CT Chest No Cont (09.13.21 @ 17:08) >    EXAM:  CT CHEST      EXAM:  CT ABDOMEN AND PELVIS        PROCEDURE DATE:  Sep 13 2021         INTERPRETATION:  CLINICAL INFORMATION: Leukocytosis of unclear etiology, assess for infection    COMPARISON: No comparable prior cross-sectional imaging available.    CONTRAST/COMPLICATIONS:  IV Contrast: None  Oral Contrast: None  Complications: None    PROCEDURE:  CT of the Chest, Abdomen and Pelvis was performed.  Sagittal and coronal reformats were performed.    FINDINGS:  CHEST:  LUNGS AND LARGE AIRWAYS: Patent central airways. Mild peribronchial thickening is seen bilaterally along with mosaic attenuation, representing focal air trapping. Scattered tree-in-bud opacities, consistent with small airways disease. Calcified granuloma.    PLEURA: Small right pleural effusion.  VESSELS: Aortic and coronary artery calcifications.  HEART: Heart is enlarged. No pericardial effusion. Mitral annular calcifications.  MEDIASTINUM AND EMA: Multiple calcified and noncalcified mediastinal lymph nodes. Noncalcified lymph nodes measuring up to 0.9 cm in the pre-carinal station.  CHEST WALL AND LOWER NECK: Substernal extension of thyroid goiter. This appears unchanged from CT cervical spine study on 9/4/2021.      ABDOMEN AND PELVIS:  LIVER: Mild nodularity of the liver.  BILE DUCTS: Normal caliber.  GALLBLADDER: Gallbladder sludge.  SPLEEN: Within normal limits.  PANCREAS: Within normal limits.  ADRENALS: Nonspecific thickening of the bilateral adrenal glands.  KIDNEYS/URETERS: Right lower pole renal scarring. Left lower pole renal cyst, measuring up to 1.5 cm.    BLADDER: Within normal limits.  REPRODUCTIVE ORGANS: The uterus and adnexa appear normal.    BOWEL: Rectal distention with stool mild rectal wall thickening, likely from chronic constipation. No bowel obstruction. Colonic diverticulosis. Appendix is normal.  PERITONEUM: No ascites.  VESSELS: Atherosclerotic changes of the aorta.  RETROPERITONEUM/LYMPH NODES: No lymphadenopathy.  ABDOMINAL WALL: Within normal limits.  BONES: Degenerative disease of the spine. Also vertebral body height and T12, L3, and L4.    IMPRESSION:  Tree in bud nodules in the bilateral lower lobes and peribronchial thickening represent small airways disease. Consider infectious bronchiolitis.    Findings suggesting chronic constipation.    Substernal extension of thyroid goiter.    < end of copied text >      < from: CT Abdomen and Pelvis No Cont (09.13.21 @ 17:07) >    IMPRESSION:  Tree in bud nodules in the bilateral lower lobes and peribronchial thickening represent small airways disease. Consider infectious bronchiolitis.    Findings suggesting chronic constipation.    Substernal extension of thyroid goiter.    < end of copied text >

## 2021-09-15 NOTE — PROGRESS NOTE ADULT - ASSESSMENT
89 yr old female with a pmh of HTN, HLD, paroxysmal Afib, and dementia AAO x1 at baseline per daughter who has not been taking any of her medication for the past 6 months as per daughter the patient does not feel that she needs them. Presents following an unwitnessed mechanical fall approximately 10:30 am (found 11:30 am by son).   Per patient she did not have any symptoms prior to having her fall and she did not fully recall having the fall. She denies any complaints when asked further. Repeats that she is 89 yrs old already.    Denies  headache, dizziness, chest pain, palpitations, SOB, abdominal pain, joint pain, diarrhea/constipation, urinary symptoms.     Vitals in the ED found to be in afib with 's -> 72, Tmax 97.9, /75, RR 16 satting 100% RA (04 Sep 2021 22:41)    HOSP COURSE:    Pt managed for Afib with RVR, s/p medical managment.  Xray of femur, lumbar spine and pelvis no acute pathology.  CT head and cspine w/o acute findings.    ID consult called for leukocytosis.  WBC increased to 28 on 9/11.  Pt afebrile, not on recent steroids.  Pt with fecal incontinence reported.        Leukocytosis:    - Wbc improving, peaked at 31.  Today 17.8.    - UA (-).  Repeat chest xray without active pulmonary disease.  Pt currently w/o localizing signs/symptoms on clinical exam, vss, afebrile, normotensive.  No bedside cough, no reported diarrhea, no rash or thrombophlebitis at IV sites, no joint effusions.   - Repeat cbc with differential in AM - continues to trend upwards.    - Blood cx growing Staph aureus from one set.   Send repeat bcx to ensure clearance.  Pt started on IV cefazolin.  - Repeat covid swab sent - negative.  Pt on isolation precautions for covid exposure.   - CT c/a/p performed - infectious bronchiolitis seen on CT imaging.    MSSA bacteremia:    - Source possible pna.  CT c/a/p performed - showed tree in bud nodules in the bilateral lower lobes and peribronchial thickening represent small airways disease. Consider infectious bronchiolitis.    - Cefazolin 2gm iv q8 started on 9/14.  Send repeat bcx to ensure clearance.    - Recent TTE on 9/8 no vegetations reported.  f/u repeat bcx, if persistently positive will assess need for further SHAHID or nuclear scan to evaluate for sequestered focus.    - Check ESR, CRP    - Plan for eventual PICC line once repeat bcx neg for 72 hrs and long termi IV abx      Pna:    - Pt with recent elevation of WBC, CT imaging showed infectious bronchiolitis - suspect pna likely source  - Cont cefazolin in the setting of mssa bacteremia  - check sputum cx if pt able to expectorate      Will follow,    Mona Yo  [phone]

## 2021-09-15 NOTE — PROGRESS NOTE ADULT - SUBJECTIVE AND OBJECTIVE BOX
Infectious Diseases progress note:    Subjective: NAD, afebrile.  Resting comfortably    ROS:  nonverbal, unable to assess    Allergies    No Known Allergies    Intolerances        ANTIBIOTICS/RELEVANT:  antimicrobials  ceFAZolin   IVPB 2000 milliGRAM(s) IV Intermittent every 8 hours  ceFAZolin   IVPB        immunologic:    OTHER:  acetaminophen   Tablet .. 650 milliGRAM(s) Oral every 6 hours PRN  apixaban 2.5 milliGRAM(s) Oral every 12 hours  diltiazem    milliGRAM(s) Oral daily  losartan 25 milliGRAM(s) Oral daily  mirtazapine 7.5 milliGRAM(s) Oral at bedtime  nadolol 40 milliGRAM(s) Oral every 12 hours  ondansetron Injectable 4 milliGRAM(s) IV Push every 8 hours PRN  polyethylene glycol 3350 17 Gram(s) Oral daily  senna 2 Tablet(s) Oral at bedtime  sodium chloride 0.45%. 1000 milliLiter(s) IV Continuous <Continuous>      Objective:  Vital Signs Last 24 Hrs  T(C): 36.2 (15 Sep 2021 05:39), Max: 36.8 (14 Sep 2021 17:26)  T(F): 97.2 (15 Sep 2021 05:39), Max: 98.2 (14 Sep 2021 17:26)  HR: 95 (15 Sep 2021 05:39) (93 - 106)  BP: 146/89 (15 Sep 2021 05:39) (115/94 - 146/89)  BP(mean): --  RR: 19 (15 Sep 2021 05:39) (17 - 19)  SpO2: 100% (15 Sep 2021 05:39) (96% - 100%)    PHYSICAL EXAM:  Constitutional:NAD  Eyes:BEATRIZ, EOMI  Ear/Nose/Throat: no thrush, mucositis.  Moist mucous membranes	  Neck:no JVD, no lymphadenopathy, supple  Respiratory: CTA rigoberto  Cardiovascular: S1S2 RRR, no murmurs  Gastrointestinal:soft, nontender,  nondistended (+) BS  Extremities:no e/e/c  Skin:  no rashes, open wounds or ulcerations, no thrombophlebitis        LABS:                        13.8   18.71 )-----------( 316      ( 15 Sep 2021 06:25 )             46.2     09-15    154<H>  |  117<H>  |  30<H>  ----------------------------<  161<H>  5.4<H>   |  24  |  0.75    Ca    9.3      15 Sep 2021 06:25  Phos  2.7     09-15  Mg     2.20     09-15    TPro  6.1  /  Alb  2.8<L>  /  TBili  1.6<H>  /  DBili  x   /  AST  36<H>  /  ALT  19  /  AlkPhos  109  09-14                            MICROBIOLOGY:    Culture - Blood (09.14.21 @ 12:14)   Specimen Source: .Blood Blood-Venous   Culture Results:   No growth to date.     Culture - Blood (09.14.21 @ 12:14)   Specimen Source: .Blood Blood-Peripheral   Culture Results:   No growth to date.     Culture - Blood (09.12.21 @ 16:38)   - Staphylococcus aureus: Detec Any isolate of Staphylococcus aureus from a blood culture is NOT considered a contaminant.   Gram Stain:   Growth in anaerobic bottle:   Gram positive cocci in pairs   Specimen Source: .Blood Blood-Peripheral   Organism: Blood Culture PCR   Culture Results:   Growth in anaerobic bottle:   Gram positive cocci in pairs   ***Blood Panel PCR results on this specimen are available   approximately 3 hours after the Gram stain result.***   Gram stain, PCR, and/or culture results may not always   correspond due to difference in methodologies.   ************************************************************   This PCR assay was performed by multiplex PCR. This   Assay tests for 66 bacterial and resistance gene targets.   Please refer to the [facility] test directory   at [email or web address] for details.   Organism Identification: Blood Culture PCR   Method Type: PCR     Culture - Blood (09.12.21 @ 16:38)   Specimen Source: .Blood Blood-Peripheral   Culture Results:   No growth to date.           RADIOLOGY & ADDITIONAL STUDIES:    < from: CT Chest No Cont (09.13.21 @ 17:08) >    IMPRESSION:  Tree in bud nodules in the bilateral lower lobes and peribronchial thickening represent small airways disease. Consider infectious bronchiolitis.    Findings suggesting chronic constipation.    Substernal extension of thyroid goiter.    < end of copied text >

## 2021-09-15 NOTE — PROGRESS NOTE ADULT - ASSESSMENT
_________________________________________________________________________________________  ========>>  M E D I C A L   A T T E N D I N G    F O L L O W  U P  N O T E  <<=========  -----------------------------------------------------------------------------------------------------    - Patient evaluated by me, chart reviewed.   - In summary,  MONTANA HSU is a 89y year old woman admitted post fall at home   - Patient today overall doing ok, comfortable, eating poorly per RN  ( need a lot of assistance) >> encouraged     ==================>> REVIEW OF SYSTEM <<=================    limited ROS due to dementia / AMS    ==================>> PHYSICAL EXAM <<=================    GEN: awake and alert, NAD , comfortable, pleasant, calm at time of exam   HEENT: NCAT, PERRL, dry mucosa, hearing intact  Neck: supple , no JVD appreciated  CVS: S1S2 , regular   PULM: CTA B/L,  no W/R/R appreciated  ABD.: soft. non tender, non distended,  bowel sounds present  Extrem: intact pulses , no edema   PSYCH : normal mood,  not anxious                              ( Note written / Date of service 09-15-21 )    ==================>> MEDICATIONS <<====================    apixaban 2.5 milliGRAM(s) Oral every 12 hours  ceFAZolin   IVPB 2000 milliGRAM(s) IV Intermittent every 8 hours  ceFAZolin   IVPB      diltiazem    milliGRAM(s) Oral daily  losartan 25 milliGRAM(s) Oral daily  mirtazapine 7.5 milliGRAM(s) Oral at bedtime  nadolol 40 milliGRAM(s) Oral every 12 hours  polyethylene glycol 3350 17 Gram(s) Oral daily  senna 2 Tablet(s) Oral at bedtime  sodium chloride 0.45%. 1000 milliLiter(s) IV Continuous <Continuous>    MEDICATIONS  (PRN):  acetaminophen   Tablet .. 650 milliGRAM(s) Oral every 6 hours PRN Temp greater or equal to 38.5C (101.3F), Mild Pain (1 - 3)  ondansetron Injectable 4 milliGRAM(s) IV Push every 8 hours PRN Nausea and/or Vomiting    ___________  Active diet:  Diet, Dysphagia 2 Mechanical Soft-Thin Liquids:   Supplement Feeding Modality:  Oral  Ensure Enlive Servings Per Day:  1       Frequency:  Daily  Ensure Pudding Cans or Servings Per Day:  1       Frequency:  Two Times a day  ___________________    ==================>> VITAL SIGNS <<==================    Vital Signs Last 24 HrsT(C): 36.2 (09-15-21 @ 05:39)  T(F): 97.2 (09-15-21 @ 05:39), Max: 98.2 (09-14-21 @ 17:26)  HR: 95 (09-15-21 @ 05:39) (93 - 106)  BP: 146/89 (09-15-21 @ 05:39)  RR: 19 (09-15-21 @ 05:39) (17 - 19)  SpO2: 100% (09-15-21 @ 05:39) (96% - 100%)       ==================>> LAB AND IMAGING <<==================                        13.8   18.71 )-----------( 316      ( 15 Sep 2021 06:25 )             46.2        09-15    154<H>  |  117<H>  |  30<H>  ----------------------------<  161<H>  5.4<H>   |  24  |  0.75    Ca    9.3      15 Sep 2021 06:25  Phos  2.7     09-15  Mg     2.20     09-15    TPro  6.1  /  Alb  2.8<L>  /  TBili  1.6<H>  /  DBili  x   /  AST  36<H>  /  ALT  19  /  AlkPhos  109  09-14    WBC count:   18.71 <<== ,  17.84 <<== ,  24.95 <<== ,  31.13 <<== ,  28.74 <<==   Hemoglobin:   13.8 <<==,  13.1 <<==,  14.8 <<==,  14.1 <<==,  15.1 <<==  platelets:  316 <==, 279 <==, 286 <==, 276 <==, 272 <==    Creatinine:  0.75  <<==, 0.65  <<==, 0.68  <<==, 0.69  <<==, 0.65  <<==, 0.84  <<==  Sodium:   154  <==, 149  <==, 150  <==, 147  <==, 144  <==, 140  <==       AST:          36 <== , 24 <== , 22 <==      ALT:        19  <== , 14  <== , 13  <==      AP:        109  <=, 110  <=, 96  <=     Bili:        1.6  <=, 2.2  <=, 2.2  <=    _______________________  C U L T U R E S :    Culture - Blood (collected 12 Sep 2021 16:38)  Source: .Blood Blood-Peripheral  Gram Stain (14 Sep 2021 04:57):    Growth in anaerobic bottle:    Gram positive cocci in pairs  Preliminary Report (14 Sep 2021 04:57):    Growth in anaerobic bottle:    Gram positive cocci in pairs    ***Blood Panel PCR results on this specimen are available    approximately 3 hours after the Gram stain result.***    Gram stain, PCR, and/or culture results may not always    correspond due to difference in methodologies.    ************************************************************    This PCR assay was performed by multiplex PCR. This    Assay tests for 66 bacterial and resistance gene targets.    Please refer to the [facility] Labs test directory    at [email or web address] for details.  Organism: Blood Culture PCR (14 Sep 2021 06:42)  Organism: Blood Culture PCR (14 Sep 2021 06:42)    Sensitivities:      -  Staphylococcus aureus: Detec Any isolate of Staphylococcus aureus from a blood culture is NOT considered a contaminant.      Method Type: PCR    Culture - Blood (collected 12 Sep 2021 16:38)  Source: .Blood Blood-Peripheral  Preliminary Report (13 Sep 2021 17:01):    No growth to date.    COVID-19 PCR: NotDetec (09-12-21 @ 11:39)  COVID-19 PCR: NotDetec (09-08-21 @ 12:12)  COVID-19 PCR: NotDetec (09-04-21 @ 20:24)      < from: CT Chest No Cont (09.13.21 @ 17:08) >  IMPRESSION:  Tree in bud nodules in the bilateral lower lobes and peribronchial thickening represent small airways disease. Consider infectious bronchiolitis.  Findings suggesting chronic constipation.  Substernal extension of thyroid goiter.  < end of copied text >    < from: Transthoracic Echocardiogram (09.08.21 @ 00:16) >  CONCLUSIONS:  1. Mitral annular calcification, otherwise normal mitral  valve. Mild-moderate mitral regurgitation.  2. Calcified trileaflet aortic valve with normal opening.  Mild aortic regurgitation.  3. Moderately dilated left atrium.  LA volume index = 44 cc/m2.  4. Normal left ventricular internal dimensions and wall thicknesses.  5. Endocardium not well visualized; grossly moderate global  left ventricular systolic dysfunction.  6. Severe right atrial enlargement.  7. Normal right ventricular size and function.  8. Estimated right ventricular systolic pressure equals 63  mm Hg, assuming right atrial pressure equals 10 mm Hg,  consistent with severe pulmonary hypertension.  ------------------------------------------------------------------------  Confirmed on  9/8/2021 - 17:45:25 by Ermias Camara M.D.,  [facility], [facility]  ------------------------------------------------------------------------  < end of copied text >  ___________________________________________________________________________________  ===============>>  A S S E S S M E N T   A N D   P L A N <<===============  ------------------------------------------------------------------------------------------    · Assessment	  89 yr old female with HTN and paroxysmal afib presenting with afib with RVR and need for placement     Problem/Plan - :  ·  Problem: Leukocytosis: bacteremia, likely developing pneumonia         recent COVID exposure but testing negative     ID on board and appreciated / discussed     follow cultures / repeat cultures til clearance    Abx as started >> eventually will need PICC line     trend CBC    gentle IVH as needed for hypernatremia    discussed with RN to assist with feeding / nutrition hydration      added Remeron low dose for wakefulness / appetite     Problem/Plan - 1:  ·  Problem: New Atrial fibrillation with rapid ventricular response. >> overall stable / improved   Eliquis as ordered >> to reassess later given history of fall and pt lives a lone at home   ECHO as above showing likely chronic systolic CHF and severe pulmonary HTN  Cardiology appreciated  med optimization per cardio / EP  otherwise Dc planing     Problem/Plan - 2:  ·  Problem: Unwitnessed fall. ( pt lives at home alone)   Xray of left femur, lumbar spine and pelvis with no acute pathology  CT head and cervical spine with no acute findings  PT consult.  fall precautions    OOB to chair daily as able     Problem/Plan - 3:  ·  Problem: Benign essential HTN.   Continue Current medications otherwise and monitor.  cardio following     Problem/Plan - 4:  ·  Problem: Dementia.   AAO x1 at baseline per daughter  Re-orientation.  monitor   safe dispo planing ( pt reportedly lived alone at home)     Problem/Plan - 5:  ·  Problem: Social problem.   daughter reports patient lives at home alone and they would like to look for assistance/placement  social work / CM f/u    Problem/Plan - 6:  ·  Problem: HFrEF (heart failure with reduced ejection fraction).   chronic systolic heart failure   Repeat echo. noted   cardio following    -GI/DVT Prophylaxis per protocol.    discussed with pt's Dtr and updated yesterday: all questions answered     discussed with ID   overall prognosis guarded  ___________________________  H. QUENTIN Antonio.  Pager: [phone]

## 2021-09-15 NOTE — PROGRESS NOTE ADULT - SUBJECTIVE AND OBJECTIVE BOX
Cardiovascular Disease Progress Note    Overnight events: No acute events overnight.  no cp/sob/palps  Otherwise review of systems negative    Objective Findings:  T(C): 36.2 (09-15-21 @ 05:39), Max: 36.8 (09-14-21 @ 17:26)  HR: 95 (09-15-21 @ 05:39) (93 - 106)  BP: 146/89 (09-15-21 @ 05:39) (115/94 - 146/89)  RR: 19 (09-15-21 @ 05:39) (17 - 19)  SpO2: 100% (09-15-21 @ 05:39) (96% - 100%)  Wt(kg): --  Daily     Daily       Physical Exam:  Gen: NAD  HEENT: EOMI  CV: RRR, normal S1 + S2, no m/r/g  Lungs: CTAB  Abd: soft, non-tender  Ext: No edema    Telemetry:    Laboratory Data:                        13.8   18.71 )-----------( 316      ( 15 Sep 2021 06:25 )             46.2     09-15    154<H>  |  117<H>  |  30<H>  ----------------------------<  161<H>  5.4<H>   |  24  |  0.75    Ca    9.3      15 Sep 2021 06:25  Phos  2.7     09-15  Mg     2.20     09-15    TPro  6.1  /  Alb  2.8<L>  /  TBili  1.6<H>  /  DBili  x   /  AST  36<H>  /  ALT  19  /  AlkPhos  109  09-14              Inpatient Medications:  MEDICATIONS  (STANDING):  apixaban 2.5 milliGRAM(s) Oral every 12 hours  ceFAZolin   IVPB 2000 milliGRAM(s) IV Intermittent every 8 hours  ceFAZolin   IVPB      diltiazem    milliGRAM(s) Oral daily  losartan 25 milliGRAM(s) Oral daily  nadolol 40 milliGRAM(s) Oral every 12 hours  polyethylene glycol 3350 17 Gram(s) Oral daily  senna 2 Tablet(s) Oral at bedtime  sodium chloride 0.45%. 1000 milliLiter(s) (75 mL/Hr) IV Continuous <Continuous>  sodium zirconium cyclosilicate 10 Gram(s) Oral once      Assessment:  persistent atrial fibrillation  chronic systolic HF --> mod global lv dysfunction  severe pHTN  leukocytosis  hypernatremia    Recs:  cv stable  no e/o acs  hypovolemic. monitor off diuretics. consider IV fluids for tx of hypernatremia. consult renal  cw diltiazem and nadolol as dosed. cw eliquis for stroke prevention  gdmt for lv dysfunction --> beta blockers and ARB. defer ischemic eval for now. 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 defer rhc and ischemic eval for now. diuretics prn  monitor on tele  eps f/u          Over 25 minutes spent on total encounter; more than 50% of the visit was spent counseling and/or coordinating care by the attending physician.      Felipe Hills MD   Cardiovascular Disease  [phone]

## 2021-09-16 NOTE — PROGRESS NOTE ADULT - ASSESSMENT
89 yr old female with a pmh of HTN, HLD, paroxysmal Afib, and dementia AAO x1 at baseline per daughter who has not been taking any of her medication for the past 6 months as per daughter the patient does not feel that she needs them. Presents following an unwitnessed mechanical fall approximately 10:30 am (found 11:30 am by son).   Per patient she did not have any symptoms prior to having her fall and she did not fully recall having the fall. She denies any complaints when asked further. Repeats that she is 89 yrs old already.    Denies  headache, dizziness, chest pain, palpitations, SOB, abdominal pain, joint pain, diarrhea/constipation, urinary symptoms.     Vitals in the ED found to be in afib with 's -> 72, Tmax 97.9, /75, RR 16 satting 100% RA (04 Sep 2021 22:41)    HOSP COURSE:    Pt managed for Afib with RVR, s/p medical managment.  Xray of femur, lumbar spine and pelvis no acute pathology.  CT head and cspine w/o acute findings.    ID consult called for leukocytosis.  WBC increased to 28 on 9/11.  Pt afebrile, not on recent steroids.  Pt with fecal incontinence reported.        Leukocytosis:    - Wbc improving, peaked at 31.  Today 17.8.    - UA (-).  Repeat chest xray without active pulmonary disease.  Pt currently w/o localizing signs/symptoms on clinical exam, vss, afebrile, normotensive.  No bedside cough, no reported diarrhea, no rash or thrombophlebitis at IV sites, no joint effusions.   - Repeat cbc with differential in AM - continues to trend upwards.    - Blood cx growing Staph aureus from one set.   Send repeat bcx to ensure clearance.  Pt started on IV cefazolin.  - Repeat covid swab sent - negative.  Pt on isolation precautions for covid exposure.   - CT c/a/p performed - infectious bronchiolitis seen on CT imaging.    MSSA bacteremia:    - Source possible pna.  CT c/a/p performed - showed tree in bud nodules in the bilateral lower lobes and peribronchial thickening represent small airways disease. Consider infectious bronchiolitis.    - Cefazolin 2gm iv q8 started on 9/14.  Send repeat bcx to ensure clearance.    - Recent TTE on 9/8 no vegetations reported.  f/u repeat bcx, if persistently positive will assess need for further SHAHID or nuclear scan to evaluate for sequestered focus.    - Check ESR, CRP    - Plan for eventual PICC line once repeat bcx neg for 72 hrs and long termi IV abx.  Plan for total 4 week course from date of neg bcx (9/14 to 10/11).  Check weekly cbc, sma-7, esr, crp.       Pna:    - Pt with recent elevation of WBC, CT imaging showed infectious bronchiolitis - suspect pna likely source  - Cont cefazolin in the setting of mssa bacteremia  - check sputum cx if pt able to expectorate      Will follow,    Mona Yo  [phone]

## 2021-09-16 NOTE — PROGRESS NOTE ADULT - SUBJECTIVE AND OBJECTIVE BOX
Date of Service  : 09-16-21 @ 17:17    INTERVAL HPI/OVERNIGHT EVENTS: Now new concerns.   Vital Signs Last 24 Hrs  T(C): 36.4 (16 Sep 2021 14:20), Max: 36.7 (16 Sep 2021 06:03)  T(F): 97.5 (16 Sep 2021 14:20), Max: 98.1 (16 Sep 2021 06:03)  HR: 75 (16 Sep 2021 14:20) (75 - 95)  BP: 153/98 (16 Sep 2021 14:20) (153/87 - 159/91)  BP(mean): --  RR: 17 (16 Sep 2021 14:20) (16 - 17)  SpO2: 97% (16 Sep 2021 14:20) (97% - 97%)  I&O's Summary    MEDICATIONS  (STANDING):  apixaban 2.5 milliGRAM(s) Oral every 12 hours  ceFAZolin   IVPB 2000 milliGRAM(s) IV Intermittent every 8 hours  ceFAZolin   IVPB      diltiazem    milliGRAM(s) Oral daily  losartan 25 milliGRAM(s) Oral daily  mirtazapine 7.5 milliGRAM(s) Oral at bedtime  nadolol 40 milliGRAM(s) Oral every 12 hours  polyethylene glycol 3350 17 Gram(s) Oral daily  senna 2 Tablet(s) Oral at bedtime    MEDICATIONS  (PRN):  acetaminophen   Tablet .. 650 milliGRAM(s) Oral every 6 hours PRN Temp greater or equal to 38.5C (101.3F), Mild Pain (1 - 3)  ondansetron Injectable 4 milliGRAM(s) IV Push every 8 hours PRN Nausea and/or Vomiting    LABS:                        13.0   14.96 )-----------( 275      ( 16 Sep 2021 06:47 )             43.2     09-16    149<H>  |  113<H>  |  23  ----------------------------<  133<H>  4.1   |  24  |  0.66    Ca    8.7      16 Sep 2021 06:47  Phos  3.0     09-16  Mg     2.00     09-16    TPro  6.5  /  Alb  2.9<L>  /  TBili  2.0<H>  /  DBili  x   /  AST  61<H>  /  ALT  13  /  AlkPhos  156<H>  09-16        CAPILLARY BLOOD GLUCOSE                  Consultant(s) Notes Reviewed:  [x ] YES  [ ] NO    PHYSICAL EXAM:  GENERAL: NAD, well-groomed, well-developed, not in any distress ,  HEAD:  Atraumatic, Normocephalic  NECK: Supple, No JVD, Normal thyroid  NERVOUS SYSTEM:  Alert &  No focal deficit   CHEST/LUNG: Good air entry bilateral with no  rales, rhonchi, wheezing, or rubs  HEART: Regular rate and rhythm; No murmurs, rubs, or gallops  ABDOMEN: Soft, Nontender, Nondistended; Bowel sounds present  EXTREMITIES:  2+ Peripheral Pulses, No clubbing, cyanosis, or edema  SKIN: No rashes or lesions    Care Discussed with Consultants/Other Providers [ x] YES  [ ] NO

## 2021-09-16 NOTE — PROGRESS NOTE ADULT - SUBJECTIVE AND OBJECTIVE BOX
Infectious Diseases progress note:    Subjective: NAD, afebrile.  No acute o/n events.     ROS:  Pt with dementia, minimally verbal.  Unable to fully assess    Allergies    No Known Allergies    Intolerances        ANTIBIOTICS/RELEVANT:  antimicrobials  ceFAZolin   IVPB 2000 milliGRAM(s) IV Intermittent every 8 hours  ceFAZolin   IVPB        immunologic:    OTHER:  acetaminophen   Tablet .. 650 milliGRAM(s) Oral every 6 hours PRN  apixaban 2.5 milliGRAM(s) Oral every 12 hours  diltiazem    milliGRAM(s) Oral daily  losartan 25 milliGRAM(s) Oral daily  mirtazapine 7.5 milliGRAM(s) Oral at bedtime  nadolol 40 milliGRAM(s) Oral every 12 hours  ondansetron Injectable 4 milliGRAM(s) IV Push every 8 hours PRN  polyethylene glycol 3350 17 Gram(s) Oral daily  senna 2 Tablet(s) Oral at bedtime      Objective:  Vital Signs Last 24 Hrs  T(C): 36.4 (16 Sep 2021 14:20), Max: 36.7 (16 Sep 2021 06:03)  T(F): 97.5 (16 Sep 2021 14:20), Max: 98.1 (16 Sep 2021 06:03)  HR: 75 (16 Sep 2021 14:20) (75 - 95)  BP: 153/98 (16 Sep 2021 14:20) (153/87 - 159/91)  BP(mean): --  RR: 17 (16 Sep 2021 14:20) (16 - 17)  SpO2: 97% (16 Sep 2021 14:20) (97% - 97%)    PHYSICAL EXAM:  Constitutional:NAD  Eyes:BEATRIZ, EOMI  Ear/Nose/Throat: no thrush, mucositis.  Moist mucous membranes	  Neck:no JVD, no lymphadenopathy, supple  Respiratory: CTA rigoberto  Cardiovascular: S1S2 RRR, no murmurs  Gastrointestinal:soft, nontender,  nondistended (+) BS  Extremities:no e/e/c  Skin:  no rashes, open wounds or ulcerations        LABS:                        13.0   14.96 )-----------( 275      ( 16 Sep 2021 06:47 )             43.2     09-16    149<H>  |  113<H>  |  23  ----------------------------<  133<H>  4.1   |  24  |  0.66    Ca    8.7      16 Sep 2021 06:47  Phos  3.0     09-16  Mg     2.00     09-16    TPro  6.5  /  Alb  2.9<L>  /  TBili  2.0<H>  /  DBili  x   /  AST  61<H>  /  ALT  13  /  AlkPhos  156<H>  09-16          MICROBIOLOGY:    Culture - Blood (09.14.21 @ 12:14)   Specimen Source: .Blood Blood-Venous   Culture Results:   No growth to date.     Culture - Blood (09.14.21 @ 12:14)   Specimen Source: .Blood Blood-Peripheral   Culture Results:   No growth to date.     Culture - Blood (09.12.21 @ 16:38)   - Staphylococcus aureus: Detec Any isolate of Staphylococcus aureus from a blood culture is NOT considered a contaminant.   Gram Stain:   Growth in anaerobic bottle:   Gram positive cocci in pairs   Specimen Source: .Blood Blood-Peripheral   Organism: Blood Culture PCR   Culture Results:   Growth in anaerobic bottle: Staphylococcus aureus Susceptibility to   follow.   ***Blood Panel PCR results on this specimen are available   approximately 3 hours after the Gram stain result.***   Gram stain, PCR, and/or culture results may not always   correspond due to difference in methodologies.   ************************************************************   This PCR assay was performed by multiplex PCR. This   Assay tests for 66 bacterial and resistance gene targets.   Please refer to the [facility] test directory   at [email or web address] for details.   Organism Identification: Blood Culture PCR   Staphylococcus aureus   Method Type: PCR       RADIOLOGY & ADDITIONAL STUDIES:    < from: CT Chest No Cont (09.13.21 @ 17:08) >    IMPRESSION:  Tree in bud nodules in the bilateral lower lobes and peribronchial thickening represent small airways disease. Consider infectious bronchiolitis.    Findings suggesting chronic constipation.    Substernal extension of thyroid goiter.    < end of copied text >      < from: CT Abdomen and Pelvis No Cont (09.13.21 @ 17:07) >  IMPRESSION:  Tree in bud nodules in the bilateral lower lobes and peribronchial thickening represent small airways disease. Consider infectious bronchiolitis.    Findings suggesting chronic constipation.    Substernal extension of thyroid goiter.    < end of copied text >      < from: Xray Chest 1 View- PORTABLE-Routine (Xray Chest 1 View- PORTABLE-Routine .) (09.11.21 @ 17:18) >  IMPRESSION:  No active pulmonary disease.    Thank you for the courtesy of this referral.    < end of copied text >

## 2021-09-16 NOTE — PROGRESS NOTE ADULT - ASSESSMENT
89 yr old female with HTN and paroxysmal afib presenting with afib with RVR and need for placement      Problem/Plan - :  ·  Problem: Leukocytosis: bacteremia, likely developing pneumonia         recent COVID exposure but testing negative     ID on board and appreciated / discussed     follow cultures / repeat cultures til clearance    Abx as started >> eventually will need PICC line     trend CBC    gentle IVH as needed for hypernatremia    discussed with RN to assist with feeding / nutrition hydration      added Remeron low dose for wakefulness / appetite      Problem/Plan - 1:  ·  Problem: New Atrial fibrillation with rapid ventricular response. >> overall stable / improved   Eliquis as ordered >> to reassess later given history of fall and pt lives a lone at home   ECHO as above showing likely chronic systolic CHF and severe pulmonary HTN  Cardiology appreciated  med optimization per cardio / EP  otherwise Dc planing      Problem/Plan - 2:  ·  Problem: Unwitnessed fall. ( pt lives at home alone)   Xray of left femur, lumbar spine and pelvis with no acute pathology  CT head and cervical spine with no acute findings  PT consult.  fall precautions    OOB to chair daily as able      Problem/Plan - 3:  ·  Problem: Benign essential HTN.   Continue Current medications otherwise and monitor.  cardio following      Problem/Plan - 4:  ·  Problem: Dementia.   AAO x1 at baseline per daughter  Re-orientation.  monitor   safe dispo planing ( pt reportedly lived alone at home)      Problem/Plan - 5:  ·  Problem: Social problem.   daughter reports patient lives at home alone and they would like to look for assistance/placement  social work / CM f/u     Problem/Plan - 6:  ·  Problem: HFrEF (heart failure with reduced ejection fraction).   chronic systolic heart failure   Repeat echo. noted   cardio following    -GI/DVT Prophylaxis per protocol.    discussed with pt's Dtr and updated yesterday: all questions answered     discussed with ID   overall prognosis guarded

## 2021-09-17 NOTE — PROGRESS NOTE ADULT - SUBJECTIVE AND OBJECTIVE BOX
Infectious Diseases progress note:    Subjective:  NAD, afebrile.  No acute events.  Antibiotics d/c'd as pt on comfort care.      ROS:  nonverbal, unable to assess    Allergies    No Known Allergies    Intolerances        ANTIBIOTICS/RELEVANT:  antimicrobials    immunologic:    OTHER:  acetaminophen   Tablet .. 650 milliGRAM(s) Oral every 6 hours PRN  apixaban 2.5 milliGRAM(s) Oral every 12 hours  diltiazem    milliGRAM(s) Oral daily  losartan 25 milliGRAM(s) Oral daily  mirtazapine 15 milliGRAM(s) Oral at bedtime  nadolol 40 milliGRAM(s) Oral every 12 hours  polyethylene glycol 3350 17 Gram(s) Oral daily  senna 2 Tablet(s) Oral at bedtime      Objective:  Vital Signs Last 24 Hrs  T(C): 36.6 (17 Sep 2021 18:49), Max: 36.7 (17 Sep 2021 05:40)  T(F): 97.8 (17 Sep 2021 18:49), Max: 98 (17 Sep 2021 05:40)  HR: 98 (17 Sep 2021 18:49) (62 - 98)  BP: 144/74 (17 Sep 2021 18:49) (144/74 - 156/78)  BP(mean): --  RR: 18 (17 Sep 2021 18:49) (17 - 18)  SpO2: 98% (17 Sep 2021 18:49) (96% - 98%)    PHYSICAL EXAM:  Constitutional:NAD  Eyes:BEATRIZ, EOMI  Ear/Nose/Throat: no thrush, mucositis.  Moist mucous membranes	  Neck:no JVD, no lymphadenopathy, supple  Respiratory: CTA rigoberto  Cardiovascular: S1S2 RRR, no murmurs  Gastrointestinal:soft, nontender,  nondistended (+) BS  Extremities:no e/e/c  Skin:  no rashes, open wounds or ulcerations        LABS:                        13.6   15.39 )-----------( 284      ( 17 Sep 2021 06:33 )             43.9     09-17    149<H>  |  111<H>  |  21  ----------------------------<  140<H>  4.1   |  28  |  0.63    Ca    8.8      17 Sep 2021 06:33  Phos  2.7     09-17  Mg     2.00     09-17    TPro  6.7  /  Alb  3.0<L>  /  TBili  2.0<H>  /  DBili  x   /  AST  66<H>  /  ALT  15  /  AlkPhos  188<H>  09-17            MICROBIOLOGY:    Culture - Blood (09.14.21 @ 12:14)   Specimen Source: .Blood Blood-Venous   Culture Results:   No growth to date.     Culture - Blood (09.14.21 @ 12:14)   Specimen Source: .Blood Blood-Peripheral   Culture Results:   No growth to date.       RADIOLOGY & ADDITIONAL STUDIES:    < from: CT Chest No Cont (09.13.21 @ 17:08) >    EXAM:  CT CHEST      EXAM:  CT ABDOMEN AND PELVIS        PROCEDURE DATE:  Sep 13 2021         INTERPRETATION:  CLINICAL INFORMATION: Leukocytosis of unclear etiology, assess for infection    COMPARISON: No comparable prior cross-sectional imaging available.    CONTRAST/COMPLICATIONS:  IV Contrast: None  Oral Contrast: None  Complications: None    PROCEDURE:  CT of the Chest, Abdomen and Pelvis was performed.  Sagittal and coronal reformats were performed.    FINDINGS:  CHEST:  LUNGS AND LARGE AIRWAYS: Patent central airways. Mild peribronchial thickening is seen bilaterally along with mosaic attenuation, representing focal air trapping. Scattered tree-in-bud opacities, consistent with small airways disease. Calcified granuloma.    PLEURA: Small right pleural effusion.  VESSELS: Aortic and coronary artery calcifications.  HEART: Heart is enlarged. No pericardial effusion. Mitral annular calcifications.  MEDIASTINUM AND EMA: Multiple calcified and noncalcified mediastinal lymph nodes. Noncalcified lymph nodes measuring up to 0.9 cm in the pre-carinal station.  CHEST WALL AND LOWER NECK: Substernal extension of thyroid goiter. This appears unchanged from CT cervical spine study on 9/4/2021.      ABDOMEN AND PELVIS:  LIVER: Mild nodularity of the liver.  BILE DUCTS: Normal caliber.  GALLBLADDER: Gallbladder sludge.  SPLEEN: Within normal limits.  PANCREAS: Within normal limits.  ADRENALS: Nonspecific thickening of the bilateral adrenal glands.  KIDNEYS/URETERS: Right lower pole renal scarring. Left lower pole renal cyst, measuring up to 1.5 cm.    BLADDER: Within normal limits.  REPRODUCTIVE ORGANS: The uterus and adnexa appear normal.    BOWEL: Rectal distention with stool mild rectal wall thickening, likely from chronic constipation. No bowel obstruction. Colonic diverticulosis. Appendix is normal.  PERITONEUM: No ascites.  VESSELS: Atherosclerotic changes of the aorta.  RETROPERITONEUM/LYMPH NODES: No lymphadenopathy.  ABDOMINAL WALL: Within normal limits.  BONES: Degenerative disease of the spine. Also vertebral body height and T12, L3, and L4.    IMPRESSION:  Tree in bud nodules in the bilateral lower lobes and peribronchial thickening represent small airways disease. Consider infectious bronchiolitis.    Findings suggesting chronic constipation.    Substernal extension of thyroid goiter.    < end of copied text >

## 2021-09-17 NOTE — PROGRESS NOTE ADULT - SUBJECTIVE AND OBJECTIVE BOX
Cardiovascular Disease Progress Note    Overnight events: No acute events overnight.  no cp/sob/palps/dizziness  Otherwise review of systems negative    Objective Findings:  T(C): 36.7 (09-17-21 @ 05:40), Max: 36.7 (09-17-21 @ 05:40)  HR: 88 (09-17-21 @ 05:40) (62 - 88)  BP: 150/70 (09-17-21 @ 05:40) (150/70 - 156/78)  RR: 18 (09-17-21 @ 05:40) (17 - 18)  SpO2: 96% (09-17-21 @ 05:40) (96% - 98%)  Wt(kg): --  Daily     Daily       Physical Exam:  Gen: NAD  HEENT: EOMI  CV: RRR, normal S1 + S2, no m/r/g  Lungs: CTAB  Abd: soft, non-tender  Ext: No edema    Telemetry:    Laboratory Data:                        13.6   15.39 )-----------( 284      ( 17 Sep 2021 06:33 )             43.9     09-17    149<H>  |  111<H>  |  21  ----------------------------<  140<H>  4.1   |  28  |  0.63    Ca    8.8      17 Sep 2021 06:33  Phos  2.7     09-17  Mg     2.00     09-17    TPro  6.7  /  Alb  3.0<L>  /  TBili  2.0<H>  /  DBili  x   /  AST  66<H>  /  ALT  15  /  AlkPhos  188<H>  09-17              Inpatient Medications:  MEDICATIONS  (STANDING):  apixaban 2.5 milliGRAM(s) Oral every 12 hours  ceFAZolin   IVPB 2000 milliGRAM(s) IV Intermittent every 8 hours  ceFAZolin   IVPB      diltiazem    milliGRAM(s) Oral daily  losartan 25 milliGRAM(s) Oral daily  mirtazapine 7.5 milliGRAM(s) Oral at bedtime  nadolol 40 milliGRAM(s) Oral every 12 hours  polyethylene glycol 3350 17 Gram(s) Oral daily  senna 2 Tablet(s) Oral at bedtime      Assessment:  persistent atrial fibrillation  chronic systolic HF --> mod global lv dysfunction  severe pHTN  leukocytosis  hypernatremia    Recs:  cv stable  no e/o acs  hypovolemic. monitor off diuretics. consider IV fluids for tx of hypernatremia. consult renal  cw diltiazem and nadolol as dosed. cw eliquis for stroke prevention  gdmt for lv dysfunction --> beta blockers and ARB. defer ischemic eval for now. 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 defer rhc and ischemic eval for now. diuretics prn  monitor on tele  eps f/u  abx for mssa bacteremia per id. awaiting PICC line  rising lft, suggest obtaining RUQ sono        Over 25 minutes spent on total encounter; more than 50% of the visit was spent counseling and/or coordinating care by the attending physician.      Felipe Hills MD   Cardiovascular Disease  [phone]

## 2021-09-17 NOTE — PROGRESS NOTE ADULT - ASSESSMENT
89 yr old female with a pmh of HTN, HLD, paroxysmal Afib, and dementia AAO x1 at baseline per daughter who has not been taking any of her medication for the past 6 months as per daughter the patient does not feel that she needs them. Presents following an unwitnessed mechanical fall approximately 10:30 am (found 11:30 am by son).   Per patient she did not have any symptoms prior to having her fall and she did not fully recall having the fall. She denies any complaints when asked further. Repeats that she is 89 yrs old already.    Denies  headache, dizziness, chest pain, palpitations, SOB, abdominal pain, joint pain, diarrhea/constipation, urinary symptoms.     Vitals in the ED found to be in afib with 's -> 72, Tmax 97.9, /75, RR 16 satting 100% RA (04 Sep 2021 22:41)    HOSP COURSE:    Pt managed for Afib with RVR, s/p medical managment.  Xray of femur, lumbar spine and pelvis no acute pathology.  CT head and cspine w/o acute findings.    ID consult called for leukocytosis.  WBC increased to 28 on 9/11.  Pt afebrile, not on recent steroids.  Pt with fecal incontinence reported.        Leukocytosis:    - Wbc improving, peaked at 31.  Today 17.8.    - UA (-).  Repeat chest xray without active pulmonary disease.  Pt currently w/o localizing signs/symptoms on clinical exam, vss, afebrile, normotensive.  No bedside cough, no reported diarrhea, no rash or thrombophlebitis at IV sites, no joint effusions.   - Repeat cbc with differential in AM - continues to trend upwards.    - Blood cx growing Staph aureus from one set.   Send repeat bcx to ensure clearance.  Pt started on IV cefazolin.  - Repeat covid swab sent - negative.  Pt on isolation precautions for covid exposure.   - CT c/a/p performed - infectious bronchiolitis seen on CT imaging.    MSSA bacteremia:    - Source possible pna.  CT c/a/p performed - showed tree in bud nodules in the bilateral lower lobes and peribronchial thickening represent small airways disease. Consider infectious bronchiolitis.    - Cefazolin 2gm iv q8 started on 9/14.  Send repeat bcx to ensure clearance.    - Recent TTE on 9/8 no vegetations reported.  f/u repeat bcx, if persistently positive will assess need for further SHAHID or nuclear scan to evaluate for sequestered focus.    - Check ESR, CRP        Pna:    - Pt with recent elevation of WBC, CT imaging showed infectious bronchiolitis - suspect pna likely source  - Cont cefazolin in the setting of mssa bacteremia  - check sputum cx if pt able to expectorate      * s/p GOC conversation, Pt now on comfort care, abx d/c'd.          Mona [facility]  [phone]

## 2021-09-17 NOTE — PROGRESS NOTE ADULT - ASSESSMENT
_________________________________________________________________________________________  ========>>  M E D I C A L   A T T E N D I N G    F O L L O W  U P  N O T E  <<=========  -----------------------------------------------------------------------------------------------------    - Patient evaluated by me, chart reviewed.   - In summary,  MONTANA HSU is a 89y year old woman admitted post fall at home   - Patient today overall doing ok, comfortable, eating fairly ( took some boost pudding and ensure from me ( need a lot of assistance) >> encouraged     ==================>> REVIEW OF SYSTEM <<=================    limited ROS due to dementia / AMS    overall today mental status improved     ==================>> PHYSICAL EXAM <<=================    GEN: awake and alert, NAD , comfortable, pleasant, calm at time of exam   HEENT: NCAT, PERRL, dry mucosa, hearing intact  Neck: supple , no JVD appreciated  CVS: S1S2 , regular   PULM: CTA B/L,  no W/R/R appreciated  ABD.: soft. non tender, non distended,  bowel sounds present  Extrem: intact pulses , no edema   PSYCH : normal mood,  not anxious                             ( Note written / Date of service 09-17-21 )    ==================>> MEDICATIONS <<====================    apixaban 2.5 milliGRAM(s) Oral every 12 hours  diltiazem    milliGRAM(s) Oral daily  losartan 25 milliGRAM(s) Oral daily  mirtazapine 7.5 milliGRAM(s) Oral at bedtime  nadolol 40 milliGRAM(s) Oral every 12 hours  polyethylene glycol 3350 17 Gram(s) Oral daily  senna 2 Tablet(s) Oral at bedtime    MEDICATIONS  (PRN):  acetaminophen   Tablet .. 650 milliGRAM(s) Oral every 6 hours PRN Temp greater or equal to 38.5C (101.3F), Mild Pain (1 - 3)    ___________  Active diet:  Diet, Dysphagia 2 Mechanical Soft-Thin Liquids:   Supplement Feeding Modality:  Oral  Ensure Enlive Servings Per Day:  1       Frequency:  Daily  Ensure Pudding Cans or Servings Per Day:  1       Frequency:  Two Times a day  ___________________    ==================>> VITAL SIGNS <<==================    Vital Signs Last 24 HrsT(C): 36.7 (09-17-21 @ 05:40)  T(F): 98 (09-17-21 @ 05:40), Max: 98 (09-17-21 @ 05:40)  HR: 88 (09-17-21 @ 05:40) (62 - 88)  BP: 150/70 (09-17-21 @ 05:40)  RR: 18 (09-17-21 @ 05:40) (17 - 18)  SpO2: 96% (09-17-21 @ 05:40) (96% - 98%)       ==================>> LAB AND IMAGING <<==================                        13.6   15.39 )-----------( 284      ( 17 Sep 2021 06:33 )             43.9        09-17    149<H>  |  111<H>  |  21  ----------------------------<  140<H>  4.1   |  28  |  0.63    Ca    8.8      17 Sep 2021 06:33  Phos  2.7     09-17  Mg     2.00     09-17    TPro  6.7  /  Alb  3.0<L>  /  TBili  2.0<H>  /  DBili  x   /  AST  66<H>  /  ALT  15  /  AlkPhos  188<H>  09-17    WBC count:   15.39 <<== ,  14.96 <<== ,  18.71 <<== ,  17.84 <<== ,  24.95 <<==   Hemoglobin:   13.6 <<==,  13.0 <<==,  13.8 <<==,  13.1 <<==,  14.8 <<==  platelets:  284 <==, 275 <==, 316 <==, 279 <==, 286 <==, 276 <==    Creatinine:  0.63  <<==, 0.66  <<==, 0.70  <<==, 0.75  <<==, 0.65  <<==, 0.68  <<==  Sodium:   149  <==, 149  <==, 153  <==, 154  <==, 149  <==, 150  <==, 147  <==       AST:          66 <== , 61 <== , 36 <== , 24 <==      ALT:        15  <== , 13  <== , 19  <== , 14  <==      AP:        188  <=, 156  <=, 109  <=, 110  <=     Bili:        2.0  <=, 2.0  <=, 1.6  <=, 2.2  <=    _______________________  C U L T U R E S :    Culture - Blood (collected 14 Sep 2021 12:14)  Source: .Blood Blood-Venous  Preliminary Report (15 Sep 2021 13:02):    No growth to date.    Culture - Blood (collected 14 Sep 2021 12:14)  Source: .Blood Blood-Peripheral  Preliminary Report (15 Sep 2021 13:02):    No growth to date.    Culture - Blood (collected 12 Sep 2021 16:38)  Source: .Blood Blood-Peripheral  Preliminary Report (13 Sep 2021 17:01):    No growth to date.    Culture - Blood (collected 12 Sep 2021 13:20)  Source: .Blood Blood-Peripheral  Gram Stain (14 Sep 2021 04:57):    Growth in anaerobic bottle:    Gram positive cocci in pairs  Final Report (17 Sep 2021 07:32):    Growth in anaerobic bottle: Staphylococcus aureus    ***Blood Panel PCR results on this specimen are available    approximately 3 hours after the Gram stain result.***    Gram stain, PCR, and/or culture results may not always    correspond due to difference in methodologies.    ************************************************************    This PCR assay was performed by multiplex PCR. This    Assay tests for 66 bacterial and resistance gene targets.    Please refer to the [facility] test directory    at [email or web address] for details.  Organism: Blood Culture PCR  Staphylococcus aureus (17 Sep 2021 07:32)  Organism: Staphylococcus aureus (17 Sep 2021 07:32)    Sensitivities:      -  Ampicillin/Sulbactam: S <=8/4      -  Cefazolin: S <=4      -  Clindamycin: S <=0.25      -  Erythromycin: S <=0.25      -  Gentamicin: S <=1 Should not be used as monotherapy      -  Oxacillin: S 0.5      -  Penicillin: R 1      -  RIF- Rifampin: S <=1 Should not be used as monotherapy      -  Tetra/Doxy: S <=1      -  Trimethoprim/Sulfamethoxazole: S <=0.5/9.5      -  Vancomycin: S 1      Method Type: EDGARD  Organism: Blood Culture PCR (17 Sep 2021 07:32)    Sensitivities:      -  Staphylococcus aureus: Detec Any isolate of Staphylococcus aureus from a blood culture is NOT considered a contaminant.      Method Type: PCR      COVID-19 PCR: NotDetec (09-12-21 @ 11:39)  COVID-19 PCR: NotDetec (09-08-21 @ 12:12)  COVID-19 PCR: NotDete (09-04-21 @ 20:24)    < from: CT Chest No Cont (09.13.21 @ 17:08) >  IMPRESSION:  Tree in bud nodules in the bilateral lower lobes and peribronchial thickening represent small airways disease. Consider infectious bronchiolitis.  Findings suggesting chronic constipation.  Substernal extension of thyroid goiter.  < end of copied text >    < from: Transthoracic Echocardiogram (09.08.21 @ 00:16) >  CONCLUSIONS:  1. Mitral annular calcification, otherwise normal mitral  valve. Mild-moderate mitral regurgitation.  2. Calcified trileaflet aortic valve with normal opening.  Mild aortic regurgitation.  3. Moderately dilated left atrium.  LA volume index = 44 cc/m2.  4. Normal left ventricular internal dimensions and wall thicknesses.  5. Endocardium not well visualized; grossly moderate global  left ventricular systolic dysfunction.  6. Severe right atrial enlargement.  7. Normal right ventricular size and function.  8. Estimated right ventricular systolic pressure equals 63  mm Hg, assuming right atrial pressure equals 10 mm Hg,  consistent with severe pulmonary hypertension.  ------------------------------------------------------------------------  Confirmed on  9/8/2021 - 17:45:25 by Ermisa Camara M.D.,  [facility], [place]  ------------------------------------------------------------------------  < end of copied text >  ___________________________________________________________________________________  ===============>>  A S S E S S M E N T   A N D   P L A N <<===============  ------------------------------------------------------------------------------------------    · Assessment	  89 yr old female with HTN and paroxysmal afib presenting with afib with RVR and need for placement     Problem/Plan - :  ·  Problem: Leukocytosis: bacteremia, likely developing pneumonia         recent COVID exposure but testing negative     ID on board and appreciated / following     follow cultures / repeat cultures til clearance    Abx as started >> will need PICC line ( if final BCX negative )    trend CBC    gentle IVH as needed for hypernatremia    discussed with RN to assist with feeding / nutrition hydration      added Remeron low dose for wakefulness / appetite >> will change to 15 mg : monitor     Problem/Plan - 1:  ·  Problem: New Atrial fibrillation with rapid ventricular response. >> overall stable / improved   Eliquis as ordered >> to reassess later given history of fall and pt lives a lone at home   ECHO as above showing likely chronic systolic CHF and severe pulmonary HTN  Cardiology following  med optimization per cardio / EP    Problem/Plan - 2:  ·  Problem: Unwitnessed fall. ( pt lives at home alone)   Xray of left femur, lumbar spine and pelvis with no acute pathology  CT head and cervical spine with no acute findings  PT / OOB to chair daily   fall precautions     Problem/Plan - 3:  ·  Problem: Benign essential HTN.   Continue Current medications otherwise and monitor.  cardio following     Problem/Plan - 4:  ·  Problem: Dementia.   AAO x1 at baseline per daughter  Re-orientation.  monitor   safe dispo planing ( pt reportedly lived alone at home)     Problem/Plan - 5:  ·  Problem: Social problem.   daughter reports patient lives at home alone and they would like to look for assistance/placement  social work / CM f/u    Problem/Plan - 6:  ·  Problem: HFrEF (heart failure with reduced ejection fraction).   chronic systolic heart failure   Repeat echo. noted   cardio following    -GI/DVT Prophylaxis per protocol.    __________________________  H. QUENTIN Antonio.  Pager: [phone]

## 2021-09-17 NOTE — CHART NOTE - NSCHARTNOTEFT_GEN_A_CORE
Source: other [X] nurse, medical chart   Diet rx: Dysphagia 2 Mechanical Soft-Thin Liquids:   Supplement Feeding Modality:  Oral      Ensure Enlive Servings Per Day:  1       Frequency:  Daily  Ensure Pudding Cans or Servings Per Day:  1       Frequency:  Two Times a day (09-14-21 @ 13:48) [Active]    Pt 89 yo female with PMHx of HTN and paroxysmal afib - per chart review.   At time of visit, Pt appears confused/disoriented; Pt did not participate in the interview; Pt covered her whole body and face with her bed sheet. Per nurse, Pt's appetite not well. Noted untouched breakfast tray at time of visit. Pt needs assistance with feeding reported. No report of nausea, vomiting or diarrhea @ this time. +Fecal incontinence, per flow sheet. DNR/DNI status noted. Plan for Pt to DC home vs facility w/Hospice services after weekend. Case discussed with nurse. RDN remains available.     Pt's height: 60" (9/5)          IBW: 100#+/-10%            Pt's weight: 58.1 kg (9/5)  Pertinent Medications: Remeron, Miralax Senna, Zofran (PRN),   Pertinent Labs:  09-17 Na149 mmol/L<H> Glu 140 mg/dL<H> K+ 4.1 mmol/L Cr  0.63 mg/dL BUN 21 mg/dL 09-17 Phos 2.7 mg/dL 09-17 Alb 3.0 g/dL<L> 09-05 Chol 152 mg/dL LDL --    HDL 66 mg/dL Trig 72 mg/dL  Skin: +dryness/ecchymosis, per flow sheet     Estimated Needs: [X] no change since previous assessment  Previous Nutrition Diagnosis: [x] Inadequate Oral Intake.   Nutrition Diagnosis is [x] ongoing     Nutrition Interventions/Recommendations:   1. Continue PO diet as ordered; Monitor PO diet tolerance; Honor food preferences;  2. Continue Remeron as ordered;   3. Monitor labs, weekly weights, hydration status;

## 2021-09-17 NOTE — CHART NOTE - NSCHARTNOTEFT_GEN_A_CORE
Spoke to patient's daughter and HCP Crystal Farnsworth via cell phone [phone] about GOC/Advanced directives. Crystal was reviewing patient's living will which states no resuscitation, intubation, antibiotics, or other interventions and patient wishes to focus on comfort. MOLST completed and placed in chart. Orders placed for comfort and antibiotics discontinued as discussed with daughter. CM aware and will make Hospice referral to [place] home vs facility w/ Hospice services after weekend. Patient is still on isolation thru 9/22 for COVID exposure.

## 2021-09-18 NOTE — PROGRESS NOTE ADULT - SUBJECTIVE AND OBJECTIVE BOX
Cardiovascular Disease Progress Note    Overnight events: No acute events overnight.  no cp/sob/palps/dizziness  Otherwise review of systems negative    Objective Findings:  T(C): 36.7 (09-18-21 @ 05:26), Max: 36.8 (09-17-21 @ 21:52)  HR: 85 (09-18-21 @ 05:26) (74 - 98)  BP: 145/78 (09-18-21 @ 05:26) (144/74 - 150/76)  RR: 18 (09-18-21 @ 05:26) (17 - 18)  SpO2: 100% (09-18-21 @ 05:26) (98% - 100%)  Wt(kg): --  Daily     Daily       Physical Exam:  Gen: NAD  HEENT: EOMI  CV: RRR, normal S1 + S2, no m/r/g  Lungs: CTAB  Abd: soft, non-tender  Ext: No edema    Telemetry:    Laboratory Data:                        14.4   9.73  )-----------( 312      ( 18 Sep 2021 06:58 )             47.0     09-18    146<H>  |  107  |  19  ----------------------------<  128<H>  4.0   |  26  |  0.60    Ca    9.1      18 Sep 2021 06:58  Phos  2.8     09-18  Mg     2.10     09-18    TPro  6.7  /  Alb  3.0<L>  /  TBili  2.0<H>  /  DBili  x   /  AST  66<H>  /  ALT  15  /  AlkPhos  188<H>  09-17              Inpatient Medications:  MEDICATIONS  (STANDING):  apixaban 2.5 milliGRAM(s) Oral every 12 hours  diltiazem    milliGRAM(s) Oral daily  losartan 25 milliGRAM(s) Oral daily  mirtazapine 15 milliGRAM(s) Oral at bedtime  nadolol 40 milliGRAM(s) Oral every 12 hours  polyethylene glycol 3350 17 Gram(s) Oral daily  senna 2 Tablet(s) Oral at bedtime      Assessment:  persistent atrial fibrillation  chronic systolic HF --> mod global lv dysfunction  severe pHTN  leukocytosis  hypernatremia    Recs:  cv stable  no e/o acs  hypovolemic. monitor off diuretics. consider IV fluids for tx of hypernatremia. consult renal  cw diltiazem and nadolol as dosed. cw eliquis for stroke prevention  gdmt for lv dysfunction --> beta blockers and ARB. defer ischemic eval for now. 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 defer rhc and ischemic eval for now. diuretics prn  monitor on tele  eps f/u  abx for mssa bacteremia per id. awaiting PICC line  rising lft, suggest obtaining RUQ sono pending GOC          Over 25 minutes spent on total encounter; more than 50% of the visit was spent counseling and/or coordinating care by the attending physician.      Felipe Hills MD   Cardiovascular Disease  [phone]

## 2021-09-18 NOTE — PROGRESS NOTE ADULT - ASSESSMENT
89 yr old female with HTN and paroxysmal afib presenting with afib with RVR and need for placement      Problem/Plan - :  ·  Problem: Leukocytosis: bacteremia, likely developing pneumonia         recent COVID exposure but testing negative     ID on board and appreciated / following     follow cultures / repeat cultures til clearance    Abx as started >> will need PICC line ( if final BCX negative )    trend CBC    gentle IVH as needed for hypernatremia    discussed with RN to assist with feeding / nutrition hydration      added Remeron low dose for wakefulness / appetite >> will change to 15 mg : monitor      Problem/Plan - 1:  ·  Problem: New Atrial fibrillation with rapid ventricular response. >> overall stable / improved   Eliquis as ordered >> to reassess later given history of fall and pt lives a lone at home   ECHO as above showing likely chronic systolic CHF and severe pulmonary HTN  Cardiology following  med optimization per cardio / EP     Problem/Plan - 2:  ·  Problem: Unwitnessed fall. ( pt lives at home alone)   Xray of left femur, lumbar spine and pelvis with no acute pathology  CT head and cervical spine with no acute findings  PT / OOB to chair daily   fall precautions      Problem/Plan - 3:  ·  Problem: Benign essential HTN.   Continue Current medications otherwise and monitor.  cardio following      Problem/Plan - 4:  ·  Problem: Dementia.   AAO x1 at baseline per daughter  Re-orientation.  monitor   safe dispo planing ( pt reportedly lived alone at home)      Problem/Plan - 5:  ·  Problem: Social problem.   daughter reports patient lives at home alone and they would like to look for assistance/placement  social work / CM f/u     Problem/Plan - 6:  ·  Problem: HFrEF (heart failure with reduced ejection fraction).   chronic systolic heart failure   Repeat echo. noted   cardio following    -GI/DVT Prophylaxis per protocol.

## 2021-09-18 NOTE — PROGRESS NOTE ADULT - SUBJECTIVE AND OBJECTIVE BOX
Date of Service  : 09-18-21     INTERVAL HPI/OVERNIGHT EVENTS: No new concerns per staff.   Vital Signs Last 24 Hrs  T(C): 37 (18 Sep 2021 13:00), Max: 37 (18 Sep 2021 13:00)  T(F): 98.6 (18 Sep 2021 13:00), Max: 98.6 (18 Sep 2021 13:00)  HR: 85 (18 Sep 2021 13:00) (74 - 85)  BP: 161/100 (18 Sep 2021 17:28) (141/100 - 161/100)  BP(mean): --  RR: 18 (18 Sep 2021 13:00) (17 - 18)  SpO2: 100% (18 Sep 2021 13:00) (98% - 100%)  I&O's Summary    MEDICATIONS  (STANDING):  apixaban 2.5 milliGRAM(s) Oral every 12 hours  diltiazem    milliGRAM(s) Oral daily  losartan 25 milliGRAM(s) Oral daily  mirtazapine 15 milliGRAM(s) Oral at bedtime  nadolol 40 milliGRAM(s) Oral every 12 hours  polyethylene glycol 3350 17 Gram(s) Oral daily  senna 2 Tablet(s) Oral at bedtime    MEDICATIONS  (PRN):  acetaminophen   Tablet .. 650 milliGRAM(s) Oral every 6 hours PRN Temp greater or equal to 38.5C (101.3F), Mild Pain (1 - 3)    LABS:                        14.4   9.73  )-----------( 312      ( 18 Sep 2021 06:58 )             47.0     09-18    146<H>  |  107  |  19  ----------------------------<  128<H>  4.0   |  26  |  0.60    Ca    9.1      18 Sep 2021 06:58  Phos  2.8     09-18  Mg     2.10     09-18    TPro  6.7  /  Alb  3.0<L>  /  TBili  2.0<H>  /  DBili  x   /  AST  66<H>  /  ALT  15  /  AlkPhos  188<H>  09-17        CAPILLARY BLOOD GLUCOSE                Consultant(s) Notes Reviewed:  [x ] YES  [ ] NO    PHYSICAL EXAM:  GENERAL: NAD,   HEAD:  Atraumatic, Normocephalic  EYES: EOMI, PERRLA, conjunctiva and sclera clear  ENMT: No tonsillar erythema, exudates, or enlargement; Moist mucous membranes, Good dentition, No lesions  NECK: Supple, No JVD, Normal thyroid  NERVOUS SYSTEM:  Alert & No focal deficit   CHEST/LUNG: Good air entry bilateral with no  rales, rhonchi, wheezing, or rubs  HEART: Regular rate and rhythm; No murmurs, rubs, or gallops  ABDOMEN: Soft, Nontender, Nondistended; Bowel sounds present  EXTREMITIES:  2+ Peripheral Pulses, No clubbing, cyanosis, or edema    Care Discussed with Consultants/Other Providers [ x] YES  [ ] NO

## 2021-09-19 NOTE — PROGRESS NOTE ADULT - ASSESSMENT
_________________________________________________________________________________________  ========>>  M E D I C A L   A T T E N D I N G    F O L L O W  U P  N O T E  <<=========  -----------------------------------------------------------------------------------------------------    - Patient evaluated by me, chart reviewed.   - In summary,  MONTANA HSU is a 89y year old woman admitted post fall at home   - Patient today overall doing ok, comfortable, eating fairly ( need a lot of assistance)      pt more alert and talkative today !   noted today discussion / note per PA with pt's Dtr re comfort measure, no antibiotics per pt's living will !     ==================>> REVIEW OF SYSTEM <<=================    limited ROS due to dementia / AMS    overall today mental status improved     ==================>> PHYSICAL EXAM <<=================    GEN: awake and alert, NAD , comfortable, pleasant, calm at time of exam , saying few words   HEENT: NCAT, PERRL, dry mucosa, hearing intact  Neck: supple , no JVD appreciated  CVS: S1S2 , regular   PULM: CTA B/L,  no W/R/R appreciated  ABD.: soft. non tender, non distended,    Extrem: intact pulses , no edema   PSYCH : normal mood,  not anxious                               ( Note written / Date of service 09-19-21 )    ==================>> MEDICATIONS <<====================    apixaban 2.5 milliGRAM(s) Oral every 12 hours  diltiazem    milliGRAM(s) Oral daily  losartan 25 milliGRAM(s) Oral daily  mirtazapine 15 milliGRAM(s) Oral at bedtime  nadolol 40 milliGRAM(s) Oral every 12 hours  polyethylene glycol 3350 17 Gram(s) Oral daily  senna 2 Tablet(s) Oral at bedtime    MEDICATIONS  (PRN):  acetaminophen   Tablet .. 650 milliGRAM(s) Oral every 6 hours PRN Temp greater or equal to 38.5C (101.3F), Mild Pain (1 - 3)    ___________  Active diet:  Diet, Dysphagia 2 Mechanical Soft-Thin Liquids:   Supplement Feeding Modality:  Oral  Ensure Enlive Servings Per Day:  1       Frequency:  Daily  Ensure Pudding Cans or Servings Per Day:  1       Frequency:  Two Times a day  ___________________    ==================>> VITAL SIGNS <<==================    Vital Signs Last 24 HrsT(C): 36.4 (09-19-21 @ 11:40)  T(F): 97.5 (09-19-21 @ 11:40), Max: 98 (09-19-21 @ 05:56)  HR: 98 (09-19-21 @ 11:40) (84 - 98)  BP: 152/93 (09-19-21 @ 11:40)  RR: 17 (09-19-21 @ 11:40) (17 - 18)  SpO2: 98% (09-19-21 @ 05:56) (97% - 98%)       ==================>> LAB AND IMAGING <<==================                        14.4   9.73  )-----------( 312      ( 18 Sep 2021 06:58 )             47.0        09-18    146<H>  |  107  |  19  ----------------------------<  128<H>  4.0   |  26  |  0.60    Ca    9.1      18 Sep 2021 06:58  Phos  2.8     09-18  Mg     2.10     09-18      WBC count:   9.73 <<== ,  15.39 <<== ,  14.96 <<== ,  18.71 <<==   Hemoglobin:   14.4 <<==,  13.6 <<==,  13.0 <<==,  13.8 <<==  platelets:  312 <==, 284 <==, 275 <==, 316 <==, 279 <==    Creatinine:  0.60  <<==, 0.63  <<==, 0.66  <<==, 0.70  <<==, 0.75  <<==, 0.65  <<==  Sodium:   146  <==, 149  <==, 149  <==, 153  <==, 154  <==, 149  <==       AST:          66 <== , 61 <==      ALT:        15  <== , 13  <==      AP:        188  <=, 156  <=     Bili:        2.0  <=, 2.0  <=    < from: CT Chest No Cont (09.13.21 @ 17:08) >  IMPRESSION:  Tree in bud nodules in the bilateral lower lobes and peribronchial thickening represent small airways disease. Consider infectious bronchiolitis.  Findings suggesting chronic constipation.  Substernal extension of thyroid goiter.  < end of copied text >    < from: Transthoracic Echocardiogram (09.08.21 @ 00:16) >  CONCLUSIONS:  1. Mitral annular calcification, otherwise normal mitral  valve. Mild-moderate mitral regurgitation.  2. Calcified trileaflet aortic valve with normal opening.  Mild aortic regurgitation.  3. Moderately dilated left atrium.  LA volume index = 44 cc/m2.  4. Normal left ventricular internal dimensions and wall thicknesses.  5. Endocardium not well visualized; grossly moderate global  left ventricular systolic dysfunction.  6. Severe right atrial enlargement.  7. Normal right ventricular size and function.  8. Estimated right ventricular systolic pressure equals 63  mm Hg, assuming right atrial pressure equals 10 mm Hg,  consistent with severe pulmonary hypertension.  ------------------------------------------------------------------------  Confirmed on  9/8/2021 - 17:45:25 by Ermias Camara M.D.,  [facility], [place]  ------------------------------------------------------------------------  < end of copied text >  ___________________________________________________________________________________  ===============>>  A S S E S S M E N T   A N D   P L A N <<===============  ------------------------------------------------------------------------------------------    · Assessment	  89 yr old female with HTN and paroxysmal afib presenting with afib with RVR and need for placement     Problem/Plan - :  ·  Problem: Leukocytosis: bacteremia, likely developing pneumonia         recent COVID exposure but testing negative     ID on board and appreciated / following     Abx stopped now as per pt's living will as documented !     trend CBC     palliative consult in AM     Problem/Plan - 1:  ·  Problem: Atrial fibrillation >> overall stable / improved   Eliquis as ordered >> to reassess later given history of fall and pt lives a lone at home   ECHO as above showing likely chronic systolic CHF and severe pulmonary HTN  Cardiology following    Problem/Plan - 2:  ·  Problem: Benign essential HTN.   Continue Current medications otherwise and monitor.  cardio following     Problem/Plan -3:  ·  Problem: Dementia.   AAO x1 at baseline per daughter  Re-orientation.  monitor   dispo planing as bellow      palliative consul   dispo planing   __________________________  H. QUENTIN Antonio.  Pager: [phone]

## 2021-09-20 NOTE — PROGRESS NOTE ADULT - SUBJECTIVE AND OBJECTIVE BOX
Cardiovascular Disease Progress Note    Overnight events: No acute events overnight.  more awake and alert. no new cardiac sx  Otherwise review of systems negative    Objective Findings:  T(C): 36.2 (09-20-21 @ 06:19), Max: 36.6 (09-19-21 @ 21:50)  HR: 100 (09-20-21 @ 06:19) (81 - 100)  BP: 154/- (09-20-21 @ 06:19) (121/81 - 154/-)  RR: 19 (09-20-21 @ 06:19) (17 - 19)  SpO2: 98% (09-20-21 @ 06:19) (98% - 98%)  Wt(kg): --  Daily     Daily       Physical Exam:  Gen: NAD  HEENT: EOMI  CV: RRR, normal S1 + S2, no m/r/g  Lungs: CTAB  Abd: soft, non-tender  Ext: No edema    Telemetry:    Laboratory Data:                    Inpatient Medications:  MEDICATIONS  (STANDING):  apixaban 2.5 milliGRAM(s) Oral every 12 hours  diltiazem    milliGRAM(s) Oral daily  losartan 25 milliGRAM(s) Oral daily  mirtazapine 15 milliGRAM(s) Oral at bedtime  nadolol 40 milliGRAM(s) Oral every 12 hours  polyethylene glycol 3350 17 Gram(s) Oral daily  senna 2 Tablet(s) Oral at bedtime      Assessment:    persistent atrial fibrillation  chronic systolic HF --> mod global lv dysfunction  severe pHTN  leukocytosis  hypernatremia    Recs:  cv stable  vol status appears stable. no e/o chf or acs at present.  cw diltiazem and nadolol as dosed. cw eliquis for stroke prevention  gdmt for lv dysfunction --> beta blockers and ARB. defer ischemic eval for now. 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 defer rhc and ischemic eval for now. diuretics prn  monitor on tele  eps f/u  dispo planning      Over 25 minutes spent on total encounter; more than 50% of the visit was spent counseling and/or coordinating care by the attending physician.      Felipe Hills MD   Cardiovascular Disease  [phone]

## 2021-09-20 NOTE — PROGRESS NOTE ADULT - ASSESSMENT
_________________________________________________________________________________________  ========>>  M E D I C A L   A T T E N D I N G    F O L L O W  U P  N O T E  <<=========  -----------------------------------------------------------------------------------------------------    - Patient evaluated by me, chart reviewed.   - In summary,  MONTANA HSU is a 89y year old woman admitted post fall at home   - Patient today overall doing ok, comfortable, eating fairly ( need a lot of assistance)      pt more alert and talkative today !   noted today discussion / note per PA with pt's Dtr re comfort measure, no antibiotics per pt's living will !     ==================>> REVIEW OF SYSTEM <<=================    limited ROS due to dementia / AMS    overall today mental status improved     ==================>> PHYSICAL EXAM <<=================    GEN: awake and alert, NAD , comfortable, pleasant, calm at time of exam , saying few words   HEENT: NCAT, PERRL, dry mucosa, hearing intact  Neck: supple , no JVD appreciated  CVS: S1S2 , regular   PULM: CTA B/L,  no W/R/R appreciated  ABD.: soft. non tender, non distended,    Extrem: intact pulses , no edema   PSYCH : normal mood,  not anxious                             ( Note written / Date of service 09-20-21 )    ==================>> MEDICATIONS <<====================    apixaban 2.5 milliGRAM(s) Oral every 12 hours  diltiazem    milliGRAM(s) Oral daily  losartan 25 milliGRAM(s) Oral daily  mirtazapine 15 milliGRAM(s) Oral at bedtime  nadolol 40 milliGRAM(s) Oral every 12 hours  polyethylene glycol 3350 17 Gram(s) Oral daily  senna 2 Tablet(s) Oral at bedtime    MEDICATIONS  (PRN):  acetaminophen   Tablet .. 650 milliGRAM(s) Oral every 6 hours PRN Temp greater or equal to 38.5C (101.3F), Mild Pain (1 - 3)    ___________  Active diet:  Diet, Dysphagia 2 Mechanical Soft-Thin Liquids:   Supplement Feeding Modality:  Oral  Ensure Enlive Servings Per Day:  1       Frequency:  Daily  Ensure Pudding Cans or Servings Per Day:  1       Frequency:  Two Times a day  ___________________    ==================>> VITAL SIGNS <<==================    Vital Signs Last 24 HrsT(C): 36.2 (09-20-21 @ 12:07)  T(F): 97.1 (09-20-21 @ 12:07), Max: 97.8 (09-19-21 @ 21:50)  HR: 90 (09-20-21 @ 12:07) (81 - 100)  BP: 139/82 (09-20-21 @ 12:07)  RR: 18 (09-20-21 @ 12:07) (18 - 19)  SpO2: 98% (09-20-21 @ 12:07) (98% - 98%)       ==================>> LAB AND IMAGING <<==================    no labs today                  14.4   9.73  )-----------( 312      ( 18 Sep 2021 06:58 )             47.0        09-18    146<H>  |  107  |  19  ----------------------------<  128<H>  4.0   |  26  |  0.60    Ca    9.1      18 Sep 2021 06:58  Phos  2.8     09-18  Mg     2.10     09-18    WBC count:   9.73 <<== ,  15.39 <<== ,  14.96 <<==   Hemoglobin:   14.4 <<==,  13.6 <<==,  13.0 <<==  platelets:  312 <==, 284 <==, 275 <==, 316 <==    Creatinine:  0.60  <<==, 0.63  <<==, 0.66  <<==, 0.70  <<==, 0.75  <<==  Sodium:   146  <==, 149  <==, 149  <==, 153  <==, 154  <==       AST:          66 <== , 61 <==      ALT:        15  <== , 13  <==      AP:        188  <=, 156  <=     Bili:        2.0  <=, 2.0  <=    < from: CT Chest No Cont (09.13.21 @ 17:08) >  IMPRESSION:  Tree in bud nodules in the bilateral lower lobes and peribronchial thickening represent small airways disease. Consider infectious bronchiolitis.  Findings suggesting chronic constipation.  Substernal extension of thyroid goiter.  < end of copied text >    < from: Transthoracic Echocardiogram (09.08.21 @ 00:16) >  CONCLUSIONS:  1. Mitral annular calcification, otherwise normal mitral  valve. Mild-moderate mitral regurgitation.  2. Calcified trileaflet aortic valve with normal opening.  Mild aortic regurgitation.  3. Moderately dilated left atrium.  LA volume index = 44 cc/m2.  4. Normal left ventricular internal dimensions and wall thicknesses.  5. Endocardium not well visualized; grossly moderate global  left ventricular systolic dysfunction.  6. Severe right atrial enlargement.  7. Normal right ventricular size and function.  8. Estimated right ventricular systolic pressure equals 63  mm Hg, assuming right atrial pressure equals 10 mm Hg,  consistent with severe pulmonary hypertension.  ------------------------------------------------------------------------  Confirmed on  9/8/2021 - 17:45:25 by Ermias Camara M.D.,  [facility], [facility] [place]  ------------------------------------------------------------------------  < end of copied text >  ___________________________________________________________________________________  ===============>>  A S S E S S M E N T   A N D   P L A N <<===============  ------------------------------------------------------------------------------------------    · Assessment	  89 yr old female with HTN and paroxysmal afib presenting with afib with RVR and need for placement     Problem/Plan - :  ·  Problem: Leukocytosis: bacteremia, likely developing pneumonia         recent COVID exposure but testing negative     ID on board and appreciated / following     Abx stopped now as per pt's living will as documented !     trend CBC     palliative consult >> hospice referral : in proces      Problem/Plan - 1:  ·  Problem: Atrial fibrillation >> overall stable / improved   Eliquis as ordered >> to reassess later given history of fall and pt lives a lone at home   ECHO as above showing likely chronic systolic CHF and severe pulmonary HTN  Cardiology following    Problem/Plan - 2:  ·  Problem: Benign essential HTN.   Continue Current medications otherwise and monitor.  cardio following     Problem/Plan -3:  ·  Problem: Dementia.   AAO x1 at baseline per daughter  Re-orientation.  monitor   dispo planing as bellow      palliative consult  SW follow up    dispo planing     discussed with NP, CM/ SW  __________________________  H. QUENTIN Antonio.  Pager: [phone]    Dr Kasper will cover me 9/21 and 22nd.

## 2021-09-21 NOTE — PROGRESS NOTE ADULT - ASSESSMENT
89 yr old female with HTN and paroxysmal afib presenting with afib with RVR and need for placement      Problem/Plan - :  ·  Problem: Leukocytosis: bacteremia, likely developing pneumonia         recent COVID exposure but testing negative     ID on board and appreciated / following     Abx stopped now as per pt's living will as documented !     trend CBC     palliative consult >> hospice referral : in proces       Problem/Plan - 1:  ·  Problem: Atrial fibrillation >> overall stable / improved   Eliquis as ordered >> to reassess later given history of fall and pt lives a lone at home   ECHO as above showing likely chronic systolic CHF and severe pulmonary HTN  Cardiology following     Problem/Plan - 2:  ·  Problem: Benign essential HTN.   Continue Current medications otherwise and monitor.  cardio following      Problem/Plan -3:  ·  Problem: Dementia.   AAO x1 at baseline per daughter  Re-orientation.  monitor   dispo planing as bellow      palliative consult  SW follow up    dispo planing

## 2021-09-21 NOTE — PROGRESS NOTE ADULT - SUBJECTIVE AND OBJECTIVE BOX
Date of Service  : 09-21-21 @ 20:05    INTERVAL HPI/OVERNIGHT EVENTS:  Vital Signs Last 24 Hrs  T(C): 36.4 (21 Sep 2021 12:54), Max: 36.4 (21 Sep 2021 05:24)  T(F): 97.5 (21 Sep 2021 12:54), Max: 97.5 (21 Sep 2021 05:24)  HR: 89 (21 Sep 2021 16:59) (60 - 89)  BP: 134/83 (21 Sep 2021 16:59) (108/77 - 134/83)  BP(mean): --  RR: 18 (21 Sep 2021 12:54) (16 - 18)  SpO2: 100% (21 Sep 2021 12:54) (96% - 100%)  I&O's Summary    MEDICATIONS  (STANDING):  apixaban 2.5 milliGRAM(s) Oral every 12 hours  diltiazem    milliGRAM(s) Oral daily  losartan 25 milliGRAM(s) Oral daily  mirtazapine 15 milliGRAM(s) Oral at bedtime  nadolol 40 milliGRAM(s) Oral every 12 hours  polyethylene glycol 3350 17 Gram(s) Oral daily  senna 2 Tablet(s) Oral at bedtime    MEDICATIONS  (PRN):  acetaminophen   Tablet .. 650 milliGRAM(s) Oral every 6 hours PRN Temp greater or equal to 38.5C (101.3F), Mild Pain (1 - 3)    LABS:              CAPILLARY BLOOD GLUCOSE                RADIOLOGY & ADDITIONAL TESTS:    Consultant(s) Notes Reviewed:  [x ] YES  [ ] NO    PHYSICAL EXAM:  GENERAL: NAD,   HEAD:  Atraumatic, Normocephalic  EYES: EOMI, PERRLA, conjunctiva and sclera clear  ENMT: No tonsillar erythema, exudates, or enlargement; Moist mucous membranes, Good dentition, No lesions  NECK: Supple, No JVD, Normal thyroid  NERVOUS SYSTEM:  Alert &No focal deficit   CHEST/LUNG: Good air entry bilateral with no  rales, rhonchi, wheezing, or rubs  HEART: Regular rate and rhythm; No murmurs, rubs, or gallops  ABDOMEN: Soft, Nontender, Nondistended; Bowel sounds present  EXTREMITIES:  2+ Peripheral Pulses, No clubbing, cyanosis, or edema      Care Discussed with Consultants/Other Providers [ x] YES  [ ] NO

## 2021-09-22 NOTE — PROGRESS NOTE ADULT - SUBJECTIVE AND OBJECTIVE BOX
Date of Service  : 09-22-21 @ 15:06    INTERVAL HPI/OVERNIGHT EVENTS: no new concerns.   Vital Signs Last 24 Hrs  T(C): 36.3 (22 Sep 2021 12:00), Max: 36.8 (22 Sep 2021 05:30)  T(F): 97.3 (22 Sep 2021 12:00), Max: 98.2 (22 Sep 2021 05:30)  HR: 58 (22 Sep 2021 12:00) (58 - 89)  BP: 119/74 (22 Sep 2021 12:00) (119/74 - 151/74)  BP(mean): --  RR: 18 (22 Sep 2021 12:00) (15 - 18)  SpO2: 100% (22 Sep 2021 12:00) (94% - 100%)  I&O's Summary    MEDICATIONS  (STANDING):  apixaban 2.5 milliGRAM(s) Oral every 12 hours  diltiazem    milliGRAM(s) Oral daily  losartan 25 milliGRAM(s) Oral daily  mirtazapine 15 milliGRAM(s) Oral at bedtime  nadolol 40 milliGRAM(s) Oral every 12 hours  polyethylene glycol 3350 17 Gram(s) Oral daily  senna 2 Tablet(s) Oral at bedtime    MEDICATIONS  (PRN):  acetaminophen   Tablet .. 650 milliGRAM(s) Oral every 6 hours PRN Temp greater or equal to 38.5C (101.3F), Mild Pain (1 - 3)    LABS:              CAPILLARY BLOOD GLUCOSE                  Consultant(s) Notes Reviewed:  [x ] YES  [ ] NO    PHYSICAL EXAM:  GENERAL: NAD, cachetic   HEAD:  Atraumatic, Normocephalic  EYES: EOMI, PERRLA, conjunctiva and sclera clear  ENMT: No tonsillar erythema, exudates, or enlargement; Moist mucous membranes, Good dentition, No lesions  NECK: Supple, No JVD, Normal thyroid  NERVOUS SYSTEM:  Alert &No focal deficit   CHEST/LUNG: Good air entry bilateral with no  rales, rhonchi, wheezing, or rubs  HEART: Regular rate and rhythm; No murmurs, rubs, or gallops  ABDOMEN: Soft, Nontender, Nondistended; Bowel sounds present  EXTREMITIES:  2+ Peripheral Pulses, No clubbing, cyanosis, or edema  SKIN: No rashes or lesions    Care Discussed with Consultants/Other Providers [ x] YES  [ ] NO

## 2021-09-22 NOTE — PROGRESS NOTE ADULT - ASSESSMENT
89 yr old female with HTN and paroxysmal afib presenting with afib with RVR and need for placement      Problem/Plan - :  ·  Problem: Leukocytosis: bacteremia, likely developing pneumonia         recent COVID exposure but testing negative     ID on board and appreciated / following     Abx stopped now as per pt's living will as documented !     trend CBC     palliative consult >> hospice referral : in proces       Problem/Plan - 1:  ·  Problem: Atrial fibrillation >> overall stable / improved   Eliquis as ordered >> to reassess later given history of fall and pt lives a lone at home   ECHO as above showing likely chronic systolic CHF and severe pulmonary HTN  Cardiology following     Problem/Plan - 2:  ·  Problem: Benign essential HTN.   Continue Current medications otherwise and monitor.  cardio following      Problem/Plan -3:  ·  Problem: Dementia.   AAO x1 at baseline per daughter  Re-orientation.  monitor   dispo planing as bellow      palliative consult  SW follow up    dispo planing pending placement

## 2021-09-23 NOTE — PROGRESS NOTE ADULT - ASSESSMENT
_________________________________________________________________________________________  ========>>  M E D I C A L   A T T E N D I N G    F O L L O W  U P  N O T E  <<=========  -----------------------------------------------------------------------------------------------------    - Patient evaluated by me, chart reviewed.   - In summary,  MON[place]NA HSU is a 89y year old woman admitted post fall at home   - Patient today overall doing ok, comfortable, eating fairly ( need a lot of assistance)      pt more alert and talkative today !     still awaiting hospice placement..     ==================>> REVIEW OF SYSTEM <<=================    limited ROS due to dementia / AMS    overall today mental status improved     ==================>> PHYSICAL EXAM <<=================    GEN: awake and alert, NAD , comfortable, pleasant, calm at time of exam , saying few words   HEENT: NCAT, PERRL, dry mucosa, hearing intact  Neck: supple , no JVD appreciated  CVS: S1S2 , regular   PULM: CTA B/L,  no W/R/R appreciated  ABD.: soft. non tender, non distended,    Extrem: intact pulses , no edema   PSYCH : normal mood,  not anxious                             ( Note written / Date of service 09-23-21 )    ==================>> MEDICATIONS <<====================    apixaban 2.5 milliGRAM(s) Oral every 12 hours  diltiazem    milliGRAM(s) Oral daily  losartan 25 milliGRAM(s) Oral daily  mirtazapine 15 milliGRAM(s) Oral at bedtime  nadolol 40 milliGRAM(s) Oral every 12 hours  polyethylene glycol 3350 17 Gram(s) Oral daily  senna 2 Tablet(s) Oral at bedtime    MEDICATIONS  (PRN):  acetaminophen   Tablet .. 650 milliGRAM(s) Oral every 6 hours PRN Temp greater or equal to 38.5C (101.3F), Mild Pain (1 - 3)    ___________  Active diet:  Diet, Dysphagia 2 Mechanical Soft-Thin Liquids:   Supplement Feeding Modality:  Oral  Ensure Enlive Servings Per Day:  1       Frequency:  Daily  Ensure Pudding Cans or Servings Per Day:  1       Frequency:  Two Times a day  ___________________    ==================>> VITAL SIGNS <<==================    Vital Signs Last 24 HrsT(C): 36.4 (09-23-21 @ 11:45)  T(F): 97.6 (09-23-21 @ 11:45), Max: 97.7 (09-22-21 @ 22:07)  HR: 88 (09-23-21 @ 17:20) (63 - 92)  BP: 140/90 (09-23-21 @ 17:20)  RR: 18 (09-23-21 @ 11:45) (18 - 20)  SpO2: 93% (09-23-21 @ 11:45) (92% - 100%)       ==================>> LAB AND IMAGING <<==================       no labs      < from: CT Chest No Cont (09.13.21 @ 17:08) >  IMPRESSION:  Tree in bud nodules in the bilateral lower lobes and peribronchial thickening represent small airways disease. Consider infectious bronchiolitis.  Findings suggesting chronic constipation.  Substernal extension of thyroid goiter.  < end of copied text >    < from: Transthoracic Echocardiogram (09.08.21 @ 00:16) >  CONCLUSIONS:  1. Mitral annular calcification, otherwise normal mitral  valve. Mild-moderate mitral regurgitation.  2. Calcified trileaflet aortic valve with normal opening.  Mild aortic regurgitation.  3. Moderately dilated left atrium.  LA volume index = 44 cc/m2.  4. Normal left ventricular internal dimensions and wall thicknesses.  5. Endocardium not well visualized; grossly moderate global  left ventricular systolic dysfunction.  6. Severe right atrial enlargement.  7. Normal right ventricular size and function.  8. Estimated right ventricular systolic pressure equals 63  mm Hg, assuming right atrial pressure equals 10 mm Hg,  consistent with severe pulmonary hypertension.  ------------------------------------------------------------------------  Confirmed on  9/8/2021 - 17:45:25 by Ermias Camara M.D.,  [facility], [place]  ------------------------------------------------------------------------  < end of copied text >  ___________________________________________________________________________________  ===============>>  A S S E S S M E N T   A N D   P L A N <<===============  ------------------------------------------------------------------------------------------    · Assessment	  89 yr old female with HTN and paroxysmal afib presenting with afib with RVR and need for placement     Problem/Plan - :  ·  Problem: Leukocytosis: bacteremia, likely developing pneumonia         recent COVID exposure but testing negative     ID on board and appreciated / following     Abx stopped now as per pt's living will as documented !     trend CBC     palliative consult >> hospice referral /placement : in process      Problem/Plan - 1:  ·  Problem: Atrial fibrillation >> overall stable / improved   Eliquis as ordered >> to reassess later given history of fall and pt lives a lone at home   ECHO as above showing likely chronic systolic CHF and severe pulmonary HTN  Cardiology following    Problem/Plan - 2:  ·  Problem: Benign essential HTN.   Continue Current medications otherwise and monitor.  cardio following     Problem/Plan -3:  ·  Problem: Dementia.   AAO x1 at baseline per daughter  Re-orientation.  monitor   dispo planing as bellow      palliative / hospice /  follow up    dispo planing     __________________________  H. QUENTIN Antonio.  Pager: [phone]

## 2021-09-24 NOTE — CHART NOTE - NSCHARTNOTEFT_GEN_A_CORE
Source: other [X] nurse, medical chart  Diet rx: Dysphagia 2 Mechanical Soft-Thin Liquids:   Supplement Feeding Modality:  Oral      Ensure Enlive Servings Per Day:  1       Frequency:  Daily  Ensure Pudding Cans or Servings Per Day:  1       Frequency:  Two Times a day     Pt 89 yo female with PMHx of HTN and paroxysmal afib - per chart review.   At time of visit, Pt awake, appears confused/disoriented; Pt was talking to herself, Pt did not answer any of RDN's questions. Per nurse, Pt with poor appetite; Pt needs assistance with feeding. No report of nausea, vomiting or diarrhea @ this time. +BM (9/23), fecal incontinence, per flow sheet. Of note Pt DNR/DNI; Pt awaiting LTC placement w/ hospice transition. Case discussed with nurse. RDN remains available, nurse made aware.    Pt's height: 60" (9/5)      IBW: 100#+/-10%     Pt's weight: 58.1 kg (9/5)  Pertinent Medications: Remeron, Miralax Senna,    Pertinent Labs: (9/18) Hct 47.0 H, Na 146 H, Glu 128 H;   (9/17) Albumin 3.0 L, AST 66 H,  H   Skin: +dryness/ecchymosis, per flow sheet     Estimated Needs: [X] no change since previous assessment  Previous Nutrition Diagnosis: [x] Inadequate Oral Intake   Nutrition Diagnosis is [x] ongoing     Nutrition Interventions/Recommendations:   1. Continue PO diet as ordered; Monitor PO diet tolerance; Honor food preferences;  2. Continue Remeron as ordered;   3. Monitor labs, weekly weights, hydration status;  4. If Pt's PO intake remains inadequate, suggest initiating alternative means of nutrition support according to GOC plan;   RDN remains available, consult nutrition if warranted

## 2021-09-24 NOTE — PROGRESS NOTE ADULT - ASSESSMENT
_________________________________________________________________________________________  ========>>  M E D I C A L   A T T E N D I N G    F O L L O W  U P  N O T E  <<=========  -----------------------------------------------------------------------------------------------------    - Patient seen and examined by me.   - In summary,  MON[place] HSU is a 89y year old woman admitted post fall at home   - Patient today overall doing ok, comfortable, eating fairly ( need a lot of assistance)     still awaiting hospice placement..     ==================>> REVIEW OF SYSTEM <<=================    limited ROS due to dementia / AMS    overall today mental status improved     ==================>> PHYSICAL EXAM <<=================    GEN: awake and alert, NAD , comfortable, pleasant, calm at time of exam , saying few words   HEENT: NCAT, PERRL, hearing intact  Neck: supple , no JVD appreciated  CVS: S1S2 , regular   PULM: CTA B/L,  no W/R/R appreciated  ABD.: soft. non tender, non distended,    Extrem: intact pulses , no edema   PSYCH : normal mood,  not anxious                             ( note written / Date of service   09-24-21 )    ==================>> MEDICATIONS <<====================    apixaban 2.5 milliGRAM(s) Oral every 12 hours  diltiazem    milliGRAM(s) Oral daily  losartan 25 milliGRAM(s) Oral daily  mirtazapine 15 milliGRAM(s) Oral at bedtime  nadolol 40 milliGRAM(s) Oral every 12 hours  polyethylene glycol 3350 17 Gram(s) Oral daily  senna 2 Tablet(s) Oral at bedtime    MEDICATIONS  (PRN):  acetaminophen   Tablet .. 650 milliGRAM(s) Oral every 6 hours PRN Temp greater or equal to 38.5C (101.3F), Mild Pain (1 - 3)    ==================>> VITAL SIGNS <<==================     Vital Signs Last 24 HrsT(C): 36.2 (09-24-21 @ 05:25)  T(F): 97.2 (09-24-21 @ 05:25), Max: 97.4 (09-23-21 @ 22:14)  HR: 93 (09-24-21 @ 05:25) (66 - 93)  BP: 151/93 (09-24-21 @ 05:25)  RR: 18 (09-24-21 @ 05:25) (17 - 18)  SpO2: 92% (09-24-21 @ 05:25) (92% - 92%)       ==================>> LAB AND IMAGING <<==================       no labs    ___________________________________________________________________________________  ===============>>  A S S E S S M E N T   A N D   P L A N <<===============  ------------------------------------------------------------------------------------------    · Assessment	  89 yr old female with HTN and paroxysmal afib presenting with afib with RVR and need for placement     Problem/Plan - :  ·  Problem: Leukocytosis: bacteremia, likely developing pneumonia         recent COVID exposure but testing negative     ID on board and appreciated / following     Abx stopped now as per pt's living will as documented !     trend CBC     palliative consult >> hospice referral /placement : in process      Problem/Plan - 1:  ·  Problem: Atrial fibrillation >> overall stable / improved   Eliquis as ordered >> to reassess later given history of fall and pt lives a lone at home   ECHO as above showing likely chronic systolic CHF and severe pulmonary HTN  Cardiology following    Problem/Plan - 2:  ·  Problem: Benign essential HTN.   Continue Current medications otherwise and monitor.  cardio following     Problem/Plan -3:  ·  Problem: Dementia.   AAO x1 at baseline per daughter  Re-orientation.  monitor   dispo planing as bellow      palliative / hospice /  follow up    dispo planing     __________________________  H. QUENTIN Antonio.  Pager: [phone]

## 2021-09-25 NOTE — PROGRESS NOTE ADULT - SUBJECTIVE AND OBJECTIVE BOX
Date of Service  : 09-25-21 @ 11:07    INTERVAL HPI/OVERNIGHT EVENTS: I FEEL FINE.   Vital Signs Last 24 Hrs  T(C): 36.4 (25 Sep 2021 05:33), Max: 36.4 (25 Sep 2021 05:33)  T(F): 97.5 (25 Sep 2021 05:33), Max: 97.5 (25 Sep 2021 05:33)  HR: 66 (25 Sep 2021 05:33) (66 - 97)  BP: 116/85 (25 Sep 2021 05:33) (104/87 - 139/91)  BP(mean): --  RR: 16 (25 Sep 2021 05:33) (16 - 17)  SpO2: 94% (25 Sep 2021 05:33) (91% - 95%)  I&O's Summary    MEDICATIONS  (STANDING):  apixaban 2.5 milliGRAM(s) Oral every 12 hours  diltiazem    milliGRAM(s) Oral daily  losartan 25 milliGRAM(s) Oral daily  mirtazapine 15 milliGRAM(s) Oral at bedtime  nadolol 40 milliGRAM(s) Oral every 12 hours  polyethylene glycol 3350 17 Gram(s) Oral daily  senna 2 Tablet(s) Oral at bedtime    MEDICATIONS  (PRN):  acetaminophen   Tablet .. 650 milliGRAM(s) Oral every 6 hours PRN Temp greater or equal to 38.5C (101.3F), Mild Pain (1 - 3)    LABS:    09-25    152<H>  |  114<H>  |  27<H>  ----------------------------<  175<H>  4.3   |  27  |  0.83    Ca    9.1      25 Sep 2021 07:18  Phos  3.0     09-25  Mg     2.30     09-25    TPro  6.7  /  Alb  3.0<L>  /  TBili  2.2<H>  /  DBili  x   /  AST  30  /  ALT  18  /  AlkPhos  263<H>  09-25        CAPILLARY BLOOD GLUCOSE              REVIEW OF SYSTEMS:  CONSTITUTIONAL: No fever, weight loss, or fatigue  EYES: No eye pain, visual disturbances, or discharge  ENMT:  No difficulty hearing, tinnitus, vertigo; No sinus or throat pain  NECK: No pain or stiffness  RESPIRATORY: No cough, wheezing, chills or hemoptysis; No shortness of breath  CARDIOVASCULAR: No chest pain, palpitations, dizziness, or leg swelling  GASTROINTESTINAL: No abdominal or epigastric pain. No nausea, vomiting, or hematemesis; No diarrhea or constipation. No melena or hematochezia.  GENITOURINARY: No dysuria, frequency, hematuria, or incontinence  NEUROLOGICAL: No headaches, memory loss, loss of strength, numbness, or tremors      Consultant(s) Notes Reviewed:  [x ] YES  [ ] NO    PHYSICAL EXAM:  GENERAL: NAD, well-groomed, well-developed, not in any distress ,  HEAD:  Atraumatic, Normocephalic  EYES: EOMI, PERRLA, conjunctiva and sclera clear  ENMT: No tonsillar erythema, exudates, or enlargement; Moist mucous membranes, Good dentition, No lesions  NECK: Supple, No JVD, Normal thyroid  NERVOUS SYSTEM:  Alert & Oriented X3, No focal deficit   CHEST/LUNG: Good air entry bilateral with no  rales, rhonchi, wheezing, or rubs  HEART: Regular rate and rhythm; No murmurs, rubs, or gallops  ABDOMEN: Soft, Nontender, Nondistended; Bowel sounds present  EXTREMITIES:  2+ Peripheral Pulses, No clubbing, cyanosis, or edema  SKIN: No rashes or lesions    Care Discussed with Consultants/Other Providers [ x] YES  [ ] NO

## 2021-09-25 NOTE — PROGRESS NOTE ADULT - ASSESSMENT
89 yr old female with HTN and paroxysmal afib presenting with afib with RVR and need for placement      Problem/Plan - :  ·  Problem: Leukocytosis: bacteremia, likely developing pneumonia         recent COVID exposure but testing negative     ID on board and appreciated / following     Abx stopped now as per pt's living will as documented !     trend CBC     palliative consult >> hospice referral /placement : in process       Problem/Plan - 1:  ·  Problem: Atrial fibrillation >> overall stable / improved   Eliquis as ordered >> to reassess later given history of fall and pt lives a lone at home   ECHO as above showing likely chronic systolic CHF and severe pulmonary HTN  Cardiology following     Problem/Plan - 2:  ·  Problem: Benign essential HTN.   Continue Current medications otherwise and monitor.  cardio following      Problem/Plan -3:  ·  Problem: Dementia.   AAO x1 at baseline per daughter  Re-orientation.  monitor   dispo planing as bellow      palliative / hospice / SW follow up    dispo planing

## 2021-09-26 NOTE — PROGRESS NOTE ADULT - ASSESSMENT
_________________________________________________________________________________________  ========>>  M E D I C A L   A T T E N D I N G    F O L L O W  U P  N O T E  <<=========  -----------------------------------------------------------------------------------------------------    - Patient seen and examined by me earlier today.   - In summary,  MONTANA HSU is a 89y year old woman admitted post fall at home   - Patient today overall doing ok, comfortable, eating fairly ( need a lot of assistance)     still awaiting hospice placement..     ==================>> REVIEW OF SYSTEM <<=================    limited ROS due to dementia / AMS    overall today mental status improved     ==================>> PHYSICAL EXAM <<=================    GEN: awake and alert, NAD , comfortable, pleasant, calm at time of exam , saying few words   HEENT: NCAT, PERRL, hearing intact  Neck: supple , no JVD appreciated  CVS: S1S2 , regular   PULM: CTA B/L,  no W/R/R appreciated  ABD.: soft. non tender, non distended,    Extrem: intact pulses , no edema   PSYCH : normal mood,  not anxious                             ( note written / Date of service   09-24-21 )    ==================>> MEDICATIONS <<====================    apixaban 2.5 milliGRAM(s) Oral every 12 hours  diltiazem    milliGRAM(s) Oral daily  losartan 25 milliGRAM(s) Oral daily  mirtazapine 15 milliGRAM(s) Oral at bedtime  nadolol 40 milliGRAM(s) Oral every 12 hours  polyethylene glycol 3350 17 Gram(s) Oral daily  senna 2 Tablet(s) Oral at bedtime    MEDICATIONS  (PRN):  acetaminophen   Tablet .. 650 milliGRAM(s) Oral every 6 hours PRN Temp greater or equal to 38.5C (101.3F), Mild Pain (1 - 3)    ==================>> VITAL SIGNS <<==================       Vital Signs Last 24 HrsT(C): 36.7 (09-26-21 @ 06:09)  T(F): 98 (09-26-21 @ 06:09), Max: 98 (09-26-21 @ 06:09)  HR: 78 (09-26-21 @ 06:09) (78 - 84)  BP: 130/68 (09-26-21 @ 06:09)  RR: 17 (09-26-21 @ 06:09) (17 - 18)  SpO2: 98% (09-26-21 @ 06:09) (85% - 100%)       ==================>> LAB AND IMAGING <<==================       09-25    152<H>  |  114<H>  |  27<H>  ----------------------------<  175<H>  4.3   |  27  |  0.83    Ca    9.1      25 Sep 2021 07:18  Phos  3.0     09-25  Mg     2.30     09-25    TPro  6.7  /  Alb  3.0<L>  /  TBili  2.2<H>  /  DBili  x   /  AST  30  /  ALT  18  /  AlkPhos  263<H>  09-25      ___________________________________________________________________________________  ===============>>  A S S E S S M E N T   A N D   P L A N <<===============  ------------------------------------------------------------------------------------------    · Assessment	  89 yr old female with HTN and paroxysmal afib presenting with afib with RVR and need for placement     Problem/Plan - :  ·  Problem: Leukocytosis: bacteremia, likely developing pneumonia         recent COVID exposure but testing negative     ID on board and appreciated / following     Abx stopped now as per pt's living will as documented !     pt clinically doing ok , mentating also better     palliative consult >> hospice referral /placement : in process      Problem/Plan - 1:  ·  Problem: Atrial fibrillation >> overall stable / improved   Eliquis as ordered >> to reassess later given history of fall and pt lives a lone at home   ECHO as above showing likely chronic systolic CHF and severe pulmonary HTN  Cardiology following    Problem/Plan - 2:  ·  Problem: Benign essential HTN.   Continue Current medications otherwise and monitor.  cardio following     Problem/Plan -3:  ·  Problem: Dementia.   AAO x1 at baseline per daughter  Re-orientation.  monitor   dispo planing as bellow    encourage PO intake ( pt took ensure from me and drank herself)   gentle IVH ordered for hypernatremia   palliative / hospice / SW follow up    dispo planing     __________________________  H. QUENTIN Antonio.  Pager: [phone]

## 2021-09-27 NOTE — PROGRESS NOTE ADULT - PROVIDER SPECIALTY LIST ADULT
Cardiology
Infectious Disease
Infectious Disease
Internal Medicine
Internal Medicine
Cardiology
Cardiology
Infectious Disease
Internal Medicine
Cardiology
Electrophysiology
Infectious Disease
Internal Medicine
Cardiology
Cardiology
Infectious Disease
Internal Medicine
Internal Medicine

## 2021-09-27 NOTE — PROGRESS NOTE ADULT - ASSESSMENT
_________________________________________________________________________________________  ========>>  M E D I C A L   A T T E N D I N G    F O L L O W  U P  N O T E  <<=========  -----------------------------------------------------------------------------------------------------    - Patient seen and examined by me approximately sixty minutes ago.   - In summary,  MONTANA HSU is a 89y year old woman admitted post fall at home   - Patient earlier today reportedly was more SOB / tachypneic, received nebulizer treatment and small dose of Dilaudid and is much improved now     still awaiting hospice placement..  med team confirmed with Pt's Dtr about comfort measures only : possible inpatient hospice ?     ==================>> REVIEW OF SYSTEM <<=================    limited ROS due to dementia / AMS    ==================>> PHYSICAL EXAM <<=================    GEN: more sleepy, responsive, NAD , comfortable, calm   HEENT: NCAT, PERRL, hearing intact, on some O2 via NC   Neck: supple , no JVD appreciated  CVS: S1S2 , regular   PULM: CTA B/L,  no W/R/R appreciated  ABD.: soft. non tender, non distended,    Extrem: intact pulses , no edema                              ( Note written / Date of service 09-27-21 )    ==================>> MEDICATIONS <<====================    apixaban 2.5 milliGRAM(s) Oral every 12 hours  diltiazem    milliGRAM(s) Oral daily  mirtazapine 15 milliGRAM(s) Oral at bedtime  polyethylene glycol 3350 17 Gram(s) Oral daily  senna 2 Tablet(s) Oral at bedtime    MEDICATIONS  (PRN):  acetaminophen   Tablet .. 650 milliGRAM(s) Oral every 6 hours PRN Temp greater or equal to 38.5C (101.3F), Mild Pain (1 - 3)  HYDROmorphone  Injectable 0.2 milliGRAM(s) IV Push every 2 hours PRN Dyspnea    ==================>> VITAL SIGNS <<==================    Vital Signs Last 24 HrsT(C): 36.7 (09-27-21 @ 05:45)  T(F): 98 (09-27-21 @ 05:45), Max: 98.6 (09-26-21 @ 17:50)  HR: 72 (09-27-21 @ 12:27) (62 - 74)  BP: 69/44 (09-27-21 @ 11:01)  RR: 37 (09-27-21 @ 09:53) (18 - 37)  SpO2: 96% (09-27-21 @ 12:27) (96% - 98%)       ==================>> LAB AND IMAGING <<==================       no labs     ___________________________________________________________________________________  ===============>>  A S S E S S M E N T   A N D   P L A N <<===============  ------------------------------------------------------------------------------------------    · Assessment	  89 yr old female with HTN and paroxysmal afib presenting with afib with RVR and need for placement     Problem/Plan - :  ·  Problem: Leukocytosis: bacteremia, likely developing pneumonia         recent COVID exposure but testing negative     ID on board and appreciated / following     Abx stopped now as per pt's living will as documented     possible episode of aspiration today with tachypnea >> now improved         continue supportive care      palliative consult >> hospice referral /placement : in process      Problem/Plan - 1:  ·  Problem: Atrial fibrillation >> overall stable / improved   Eliquis as ordered >> to reassess later given history of fall and pt lives a lone at home   ECHO as above showing likely chronic systolic CHF and severe pulmonary HTN  Cardiology following    Problem/Plan - 2:  ·  Problem: Benign essential HTN.   Continue Current medications otherwise and monitor.  cardio following     Problem/Plan -3:  ·  Problem: Dementia.   AAO x1 at baseline per daughter  Re-orientation.  monitor   dispo planing as bellow    PO intake with aspiration precautions , no straw   palliative / hospice / SW follow up     in close contact with med team / NP  __________________________  H. QUENTIN Antonio.  Pager: [phone]       _________________________________________________________________________________________  ========>>  M E D I C A L   A T T E N D I N G    F O L L O W  U P  N O T E  <<=========  -----------------------------------------------------------------------------------------------------    - Patient seen and examined by me approximately sixty minutes ago.   - In summary,  MONTANA HSU is a 89y year old woman admitted post fall at home   - Patient earlier today reportedly was more SOB / tachypneic, received nebulizer treatment and small dose of Dilaudid and is much improved now     still awaiting hospice placement..  med team confirmed with Pt's Dtr about comfort measures only : possible inpatient hospice ?     ==================>> REVIEW OF SYSTEM <<=================    limited ROS due to dementia / AMS    ==================>> PHYSICAL EXAM <<=================    GEN: more sleepy, responsive, NAD , comfortable, calm   HEENT: NCAT, PERRL, hearing intact, on some O2 via NC   Neck: supple , no JVD appreciated  CVS: S1S2 , regular   PULM: CTA B/L,  no W/R/R appreciated  ABD.: soft. non tender, non distended,    Extrem: intact pulses , no edema                              ( Note written / Date of service 09-27-21 )    ==================>> MEDICATIONS <<====================    apixaban 2.5 milliGRAM(s) Oral every 12 hours  diltiazem    milliGRAM(s) Oral daily  mirtazapine 15 milliGRAM(s) Oral at bedtime  polyethylene glycol 3350 17 Gram(s) Oral daily  senna 2 Tablet(s) Oral at bedtime    MEDICATIONS  (PRN):  acetaminophen   Tablet .. 650 milliGRAM(s) Oral every 6 hours PRN Temp greater or equal to 38.5C (101.3F), Mild Pain (1 - 3)  HYDROmorphone  Injectable 0.2 milliGRAM(s) IV Push every 2 hours PRN Dyspnea    ==================>> VITAL SIGNS <<==================    Vital Signs Last 24 HrsT(C): 36.7 (09-27-21 @ 05:45)  T(F): 98 (09-27-21 @ 05:45), Max: 98.6 (09-26-21 @ 17:50)  HR: 72 (09-27-21 @ 12:27) (62 - 74)  BP: 69/44 (09-27-21 @ 11:01)  RR: 37 (09-27-21 @ 09:53) (18 - 37)  SpO2: 96% (09-27-21 @ 12:27) (96% - 98%)       ==================>> LAB AND IMAGING <<==================       no labs     ___________________________________________________________________________________  ===============>>  A S S E S S M E N T   A N D   P L A N <<===============  ------------------------------------------------------------------------------------------    · Assessment	  89 yr old female with HTN and paroxysmal afib presenting with afib with RVR and need for placement     Problem/Plan - :  ·  Problem: Leukocytosis: bacteremia, likely developing pneumonia         recent COVID exposure but testing negative     ID on board and appreciated / following     Abx stopped now as per pt's living will as documented     possible episode of aspiration today with tachypnea >> now improved         continue supportive care      palliative consult >> hospice referral /placement : in process      Problem/Plan - 1:  ·  Problem: Atrial fibrillation >> overall stable / improved   Eliquis as ordered >> to reassess later given history of fall and pt lives a lone at home   ECHO as above showing likely chronic systolic CHF and severe pulmonary HTN  Cardiology following    Problem/Plan - 2:  ·  Problem: Benign essential HTN.   Continue Current medications otherwise and monitor.  cardio following     Problem/Plan -3:  ·  Problem: Dementia.   AAO x1 at baseline per daughter  Re-orientation.  monitor   dispo planing as bellow    PO intake with aspiration precautions , no straw   palliative / hospice / SW follow up     in close contact with med team / NP  __________________________  H. QUENTIN Antonio.  Pager: [phone]    Dr Kasper will cover me 9/28 and 29th.

## 2021-09-27 NOTE — CHART NOTE - NSCHARTNOTEFT_GEN_A_CORE
Patient more tachypneic this morning and appears more towards end of life  Ordered for nebulizing treatment to help w/ comfort and will order Dilaudid IV PRN for dyspnea   Focusing on comfort at this time - Left message to update daughter  Spoke to Dr. Antonio about plan

## 2021-09-27 NOTE — CHART NOTE - NSCHARTNOTEFT_GEN_A_CORE
Spoke to family about bedside - Plan is to transfer to inpatient Hospice possibly tomorrow - Family (Crystal daughter) wants BP medications and blood thinners discontinued as well - Total comfort care at this time

## 2021-09-27 NOTE — PROGRESS NOTE ADULT - REASON FOR ADMISSION
fall

## 2021-09-28 NOTE — CHART NOTE - NSCHARTNOTEFT_GEN_A_CORE
Notified by RN patient has no pulse and spontaneous breathing. Patient is DNR/ DNI and was on comfort measures.  Patient seen and examined at bedside, does not respond to verbal or noxious stimuli. No spontaneous heart or lung sounds appreciated. Unable to appreciate radial/femoral or carotid pulses. Pupils fixed and dilated. Patient pronounced dead at 5:25AM. Family notified, all questions answered. Daughter  refused Autopsy. Covering Dr: Ranjith notified.

## 2021-09-28 NOTE — CHART NOTE - NSCHARTNOTESELECT_GEN_ALL_CORE
ACP
Event Note
Follow-up/Nutrition Services
Medicine ACP/Event Note
Event Note
Follow-up/Nutrition Services
GOC/Event Note
Off Service Note

## 2021-09-28 NOTE — DISCHARGE NOTE FOR THE EXPIRED PATIENT - HOSPITAL COURSE
89 yr old female with HTN and paroxysmal afib presenting with afib with RVR and need for placement      Leukocytosis: bacteremia, likely developing pneumonia    - recent COVID exposure but testing negative    - ID on board and appreciated / following    - Abx stopped now as per pt's living will as documented     possible episode of aspiration today with tachypnea >> now improved         continue supportive care      palliative consult >> hospice referral /placement : in process      Atrial fibrillation >> overall stable / improved   Eliquis as ordered >> to reassess later given history of fall and pt lives a lone at home   ECHO as above showing likely chronic systolic CHF and severe pulmonary HTN  Cardiology following    Benign essential HTN.   Continue Current medications otherwise and monitor.  cardio following     Dementia.   AAO x1 at baseline per daughter  Re-orientation.  monitor       9- @ approximately 5:09 AM Notified by RN patient noted to be pulseless. Patient seen and examined at bedside, does not respond to verbal or noxious stimuli. No spontaneous heart or lung sounds appreciated. Unable to appreciate radial/femoral or carotid pulses. Pupils fixed and dilated. Patient pronounced dead at 5:25AM. Family notified, all questions answered.  89 yr old female with HTN and paroxysmal afib presenting with afib with RVR and need for placement      Leukocytosis: bacteremia, likely developing pneumonia    - recent COVID exposure but testing negative    - ID on board and appreciated / following    - Abx stopped now as per pt's living will as documented     possible episode of aspiration today with tachypnea >> now improved         continue supportive care      palliative consult >> hospice referral /placement : in process      Atrial fibrillation >> overall stable / improved   Eliquis as ordered >> to reassess later given history of fall and pt lives a lone at home   ECHO as above showing likely chronic systolic CHF and severe pulmonary HTN  Cardiology following    Benign essential HTN.   Continue Current medications otherwise and monitor.  cardio following     Dementia.   AAO x1 at baseline per daughter  Re-orientation.  monitor       9- @ approximately 5:09 AM Notified by RN patient noted to be pulseless. Patient seen and examined at bedside, does not respond to verbal or noxious stimuli. No spontaneous heart or lung sounds appreciated. Unable to appreciate radial/femoral or carotid pulses. Pupils fixed and dilated. Patient pronounced dead at 5:25AM. Patient's daughter Crystal Farnsworth [phone] notified, all questions answered and emotional support given.   Notified covering attending.

## 2021-09-28 NOTE — CHART NOTE - NSCHARTNOTEFT_GEN_A_CORE
Patient admitted s/p unwitnessed mechanical fall. Spoke to ME department ( [phone]) . As per ,  based on the screening questioner  patient does not fit the criteria for ME case. Patient admitted s/p unwitnessed mechanical fall. Spoke to ME department ( [phone]) . As per ,  based on the screening questioner  patient does not meet the criteria for ME case.
